# Patient Record
Sex: MALE | Race: BLACK OR AFRICAN AMERICAN | HISPANIC OR LATINO | Employment: FULL TIME | URBAN - METROPOLITAN AREA
[De-identification: names, ages, dates, MRNs, and addresses within clinical notes are randomized per-mention and may not be internally consistent; named-entity substitution may affect disease eponyms.]

---

## 2017-01-03 ENCOUNTER — ALLSCRIPTS OFFICE VISIT (OUTPATIENT)
Dept: OTHER | Facility: OTHER | Age: 32
End: 2017-01-03

## 2017-07-14 ENCOUNTER — APPOINTMENT (EMERGENCY)
Dept: CT IMAGING | Facility: HOSPITAL | Age: 32
DRG: 872 | End: 2017-07-14

## 2017-07-14 ENCOUNTER — HOSPITAL ENCOUNTER (INPATIENT)
Facility: HOSPITAL | Age: 32
LOS: 2 days | Discharge: HOME | DRG: 872 | End: 2017-07-17
Attending: EMERGENCY MEDICINE | Admitting: INTERNAL MEDICINE

## 2017-07-14 DIAGNOSIS — K57.32 DIVERTICULITIS OF LARGE INTESTINE WITHOUT PERFORATION OR ABSCESS WITHOUT BLEEDING: ICD-10-CM

## 2017-07-14 DIAGNOSIS — R10.84 GENERALIZED ABDOMINAL PAIN: ICD-10-CM

## 2017-07-14 DIAGNOSIS — A41.9 SEPSIS (HCC): ICD-10-CM

## 2017-07-14 DIAGNOSIS — K57.92 ACUTE DIVERTICULITIS OF INTESTINE: Primary | ICD-10-CM

## 2017-07-14 PROBLEM — R19.7 DIARRHEA: Status: ACTIVE | Noted: 2017-07-14

## 2017-07-14 PROBLEM — R10.9 ABDOMINAL PAIN: Status: ACTIVE | Noted: 2017-07-14

## 2017-07-14 PROBLEM — F17.200 CURRENT SMOKER: Status: ACTIVE | Noted: 2017-07-14

## 2017-07-14 LAB
ALBUMIN SERPL BCP-MCNC: 3.2 G/DL (ref 3.5–5)
ALP SERPL-CCNC: 104 U/L (ref 46–116)
ALT SERPL W P-5'-P-CCNC: 23 U/L (ref 12–78)
ANION GAP SERPL CALCULATED.3IONS-SCNC: 6 MMOL/L (ref 4–13)
AST SERPL W P-5'-P-CCNC: 20 U/L (ref 5–45)
BACTERIA UR QL AUTO: ABNORMAL /HPF
BASOPHILS # BLD AUTO: 0.06 THOUSANDS/ΜL (ref 0–0.1)
BASOPHILS NFR BLD AUTO: 0 % (ref 0–1)
BILIRUB SERPL-MCNC: 0.25 MG/DL (ref 0.2–1)
BILIRUB UR QL STRIP: ABNORMAL
BUN SERPL-MCNC: 13 MG/DL (ref 5–25)
CALCIUM SERPL-MCNC: 8.4 MG/DL (ref 8.3–10.1)
CHLORIDE SERPL-SCNC: 101 MMOL/L (ref 100–108)
CLARITY UR: CLEAR
CO2 SERPL-SCNC: 30 MMOL/L (ref 21–32)
COLOR UR: ABNORMAL
CREAT SERPL-MCNC: 1.2 MG/DL (ref 0.6–1.3)
EOSINOPHIL # BLD AUTO: 0.25 THOUSAND/ΜL (ref 0–0.61)
EOSINOPHIL NFR BLD AUTO: 2 % (ref 0–6)
ERYTHROCYTE [DISTWIDTH] IN BLOOD BY AUTOMATED COUNT: 13.8 % (ref 11.6–15.1)
GFR SERPL CREATININE-BSD FRML MDRD: >60 ML/MIN/1.73SQ M
GLUCOSE SERPL-MCNC: 107 MG/DL (ref 65–140)
GLUCOSE UR STRIP-MCNC: NEGATIVE MG/DL
HCT VFR BLD AUTO: 40.9 % (ref 36.5–49.3)
HGB BLD-MCNC: 13.7 G/DL (ref 12–17)
HGB UR QL STRIP.AUTO: ABNORMAL
KETONES UR STRIP-MCNC: ABNORMAL MG/DL
LACTATE SERPL-SCNC: 0.6 MMOL/L (ref 0.5–2)
LEUKOCYTE ESTERASE UR QL STRIP: NEGATIVE
LIPASE SERPL-CCNC: 159 U/L (ref 73–393)
LYMPHOCYTES # BLD AUTO: 3.14 THOUSANDS/ΜL (ref 0.6–4.47)
LYMPHOCYTES NFR BLD AUTO: 20 % (ref 14–44)
MCH RBC QN AUTO: 29 PG (ref 26.8–34.3)
MCHC RBC AUTO-ENTMCNC: 33.5 G/DL (ref 31.4–37.4)
MCV RBC AUTO: 87 FL (ref 82–98)
MONOCYTES # BLD AUTO: 1.03 THOUSAND/ΜL (ref 0.17–1.22)
MONOCYTES NFR BLD AUTO: 7 % (ref 4–12)
NEUTROPHILS # BLD AUTO: 10.98 THOUSANDS/ΜL (ref 1.85–7.62)
NEUTS SEG NFR BLD AUTO: 71 % (ref 43–75)
NITRITE UR QL STRIP: NEGATIVE
NON-SQ EPI CELLS URNS QL MICRO: ABNORMAL /HPF
NRBC BLD AUTO-RTO: 0 /100 WBCS
PH UR STRIP.AUTO: 5.5 [PH] (ref 4.5–8)
PLATELET # BLD AUTO: 253 THOUSANDS/UL (ref 149–390)
PMV BLD AUTO: 10 FL (ref 8.9–12.7)
POTASSIUM SERPL-SCNC: 4.1 MMOL/L (ref 3.5–5.3)
PROT SERPL-MCNC: 7.9 G/DL (ref 6.4–8.2)
PROT UR STRIP-MCNC: ABNORMAL MG/DL
RBC # BLD AUTO: 4.72 MILLION/UL (ref 3.88–5.62)
RBC #/AREA URNS AUTO: ABNORMAL /HPF
SODIUM SERPL-SCNC: 137 MMOL/L (ref 136–145)
SP GR UR STRIP.AUTO: 1.02 (ref 1–1.03)
UROBILINOGEN UR QL STRIP.AUTO: 1 E.U./DL
WBC # BLD AUTO: 15.46 THOUSAND/UL (ref 4.31–10.16)
WBC #/AREA URNS AUTO: ABNORMAL /HPF

## 2017-07-14 PROCEDURE — 83605 ASSAY OF LACTIC ACID: CPT | Performed by: EMERGENCY MEDICINE

## 2017-07-14 PROCEDURE — 80053 COMPREHEN METABOLIC PANEL: CPT | Performed by: EMERGENCY MEDICINE

## 2017-07-14 PROCEDURE — 81001 URINALYSIS AUTO W/SCOPE: CPT

## 2017-07-14 PROCEDURE — 96374 THER/PROPH/DIAG INJ IV PUSH: CPT

## 2017-07-14 PROCEDURE — 83690 ASSAY OF LIPASE: CPT | Performed by: EMERGENCY MEDICINE

## 2017-07-14 PROCEDURE — 85025 COMPLETE CBC W/AUTO DIFF WBC: CPT | Performed by: EMERGENCY MEDICINE

## 2017-07-14 PROCEDURE — 87040 BLOOD CULTURE FOR BACTERIA: CPT | Performed by: EMERGENCY MEDICINE

## 2017-07-14 PROCEDURE — 81002 URINALYSIS NONAUTO W/O SCOPE: CPT | Performed by: EMERGENCY MEDICINE

## 2017-07-14 PROCEDURE — 99285 EMERGENCY DEPT VISIT HI MDM: CPT

## 2017-07-14 PROCEDURE — 96375 TX/PRO/DX INJ NEW DRUG ADDON: CPT

## 2017-07-14 PROCEDURE — 36415 COLL VENOUS BLD VENIPUNCTURE: CPT | Performed by: EMERGENCY MEDICINE

## 2017-07-14 PROCEDURE — 74177 CT ABD & PELVIS W/CONTRAST: CPT

## 2017-07-14 PROCEDURE — 96361 HYDRATE IV INFUSION ADD-ON: CPT

## 2017-07-14 RX ORDER — ONDANSETRON 2 MG/ML
4 INJECTION INTRAMUSCULAR; INTRAVENOUS ONCE
Status: COMPLETED | OUTPATIENT
Start: 2017-07-14 | End: 2017-07-14

## 2017-07-14 RX ORDER — ONDANSETRON 2 MG/ML
4 INJECTION INTRAMUSCULAR; INTRAVENOUS EVERY 6 HOURS PRN
Status: DISCONTINUED | OUTPATIENT
Start: 2017-07-14 | End: 2017-07-17 | Stop reason: HOSPADM

## 2017-07-14 RX ORDER — ACETAMINOPHEN 325 MG/1
650 TABLET ORAL EVERY 6 HOURS PRN
Status: DISCONTINUED | OUTPATIENT
Start: 2017-07-14 | End: 2017-07-17 | Stop reason: HOSPADM

## 2017-07-14 RX ORDER — CALCIUM CARBONATE 200(500)MG
1000 TABLET,CHEWABLE ORAL DAILY PRN
Status: DISCONTINUED | OUTPATIENT
Start: 2017-07-14 | End: 2017-07-15

## 2017-07-14 RX ORDER — KETOROLAC TROMETHAMINE 30 MG/ML
15 INJECTION, SOLUTION INTRAMUSCULAR; INTRAVENOUS ONCE
Status: COMPLETED | OUTPATIENT
Start: 2017-07-14 | End: 2017-07-14

## 2017-07-14 RX ORDER — KETOROLAC TROMETHAMINE 30 MG/ML
INJECTION, SOLUTION INTRAMUSCULAR; INTRAVENOUS
Status: COMPLETED
Start: 2017-07-14 | End: 2017-07-14

## 2017-07-14 RX ORDER — SODIUM CHLORIDE 9 MG/ML
50 INJECTION, SOLUTION INTRAVENOUS CONTINUOUS
Status: DISCONTINUED | OUTPATIENT
Start: 2017-07-15 | End: 2017-07-17 | Stop reason: HOSPADM

## 2017-07-14 RX ORDER — ALBUTEROL SULFATE 90 UG/1
2 AEROSOL, METERED RESPIRATORY (INHALATION) EVERY 4 HOURS PRN
Status: DISCONTINUED | OUTPATIENT
Start: 2017-07-14 | End: 2017-07-17 | Stop reason: HOSPADM

## 2017-07-14 RX ORDER — KETOROLAC TROMETHAMINE 30 MG/ML
30 INJECTION, SOLUTION INTRAMUSCULAR; INTRAVENOUS EVERY 6 HOURS PRN
Status: DISCONTINUED | OUTPATIENT
Start: 2017-07-14 | End: 2017-07-17 | Stop reason: HOSPADM

## 2017-07-14 RX ORDER — NICOTINE 21 MG/24HR
1 PATCH, TRANSDERMAL 24 HOURS TRANSDERMAL DAILY
Status: DISCONTINUED | OUTPATIENT
Start: 2017-07-15 | End: 2017-07-17 | Stop reason: HOSPADM

## 2017-07-14 RX ORDER — MORPHINE SULFATE 4 MG/ML
4 INJECTION, SOLUTION INTRAMUSCULAR; INTRAVENOUS ONCE
Status: DISCONTINUED | OUTPATIENT
Start: 2017-07-14 | End: 2017-07-17 | Stop reason: HOSPADM

## 2017-07-14 RX ADMIN — KETOROLAC TROMETHAMINE 15 MG: 30 INJECTION, SOLUTION INTRAMUSCULAR at 23:07

## 2017-07-14 RX ADMIN — IOHEXOL 100 ML: 350 INJECTION, SOLUTION INTRAVENOUS at 21:54

## 2017-07-14 RX ADMIN — SODIUM CHLORIDE 1000 ML: 0.9 INJECTION, SOLUTION INTRAVENOUS at 21:21

## 2017-07-14 RX ADMIN — ONDANSETRON 4 MG: 2 INJECTION INTRAMUSCULAR; INTRAVENOUS at 23:01

## 2017-07-14 RX ADMIN — KETOROLAC TROMETHAMINE 15 MG: 30 INJECTION, SOLUTION INTRAMUSCULAR at 21:21

## 2017-07-14 RX ADMIN — PIPERACILLIN SODIUM,TAZOBACTAM SODIUM 3.38 G: 3; .375 INJECTION, POWDER, FOR SOLUTION INTRAVENOUS at 22:42

## 2017-07-14 RX ADMIN — KETOROLAC TROMETHAMINE 15 MG: 30 INJECTION, SOLUTION INTRAMUSCULAR; INTRAVENOUS at 23:07

## 2017-07-15 PROBLEM — A41.9 SEPSIS (HCC): Status: ACTIVE | Noted: 2017-07-15

## 2017-07-15 LAB
ERYTHROCYTE [DISTWIDTH] IN BLOOD BY AUTOMATED COUNT: 14 % (ref 11.6–15.1)
HCT VFR BLD AUTO: 39.8 % (ref 36.5–49.3)
HGB BLD-MCNC: 13.2 G/DL (ref 12–17)
MCH RBC QN AUTO: 29 PG (ref 26.8–34.3)
MCHC RBC AUTO-ENTMCNC: 33.2 G/DL (ref 31.4–37.4)
MCV RBC AUTO: 88 FL (ref 82–98)
PLATELET # BLD AUTO: 268 THOUSANDS/UL (ref 149–390)
PMV BLD AUTO: 10.3 FL (ref 8.9–12.7)
RBC # BLD AUTO: 4.55 MILLION/UL (ref 3.88–5.62)
WBC # BLD AUTO: 14.92 THOUSAND/UL (ref 4.31–10.16)

## 2017-07-15 PROCEDURE — 85027 COMPLETE CBC AUTOMATED: CPT | Performed by: PHYSICIAN ASSISTANT

## 2017-07-15 PROCEDURE — C9113 INJ PANTOPRAZOLE SODIUM, VIA: HCPCS | Performed by: PHYSICIAN ASSISTANT

## 2017-07-15 RX ORDER — LEVOFLOXACIN 5 MG/ML
500 INJECTION, SOLUTION INTRAVENOUS EVERY 24 HOURS
Status: DISCONTINUED | OUTPATIENT
Start: 2017-07-15 | End: 2017-07-17 | Stop reason: HOSPADM

## 2017-07-15 RX ORDER — PANTOPRAZOLE SODIUM 40 MG/1
40 INJECTION, POWDER, FOR SOLUTION INTRAVENOUS
Status: DISCONTINUED | OUTPATIENT
Start: 2017-07-15 | End: 2017-07-17 | Stop reason: HOSPADM

## 2017-07-15 RX ORDER — MORPHINE SULFATE 4 MG/ML
4 INJECTION, SOLUTION INTRAMUSCULAR; INTRAVENOUS EVERY 4 HOURS PRN
Status: DISCONTINUED | OUTPATIENT
Start: 2017-07-15 | End: 2017-07-17 | Stop reason: HOSPADM

## 2017-07-15 RX ADMIN — SODIUM CHLORIDE 125 ML/HR: 0.9 INJECTION, SOLUTION INTRAVENOUS at 07:59

## 2017-07-15 RX ADMIN — CEFAZOLIN SODIUM 1000 MG: 1 SOLUTION INTRAVENOUS at 06:39

## 2017-07-15 RX ADMIN — ENOXAPARIN SODIUM 40 MG: 40 INJECTION SUBCUTANEOUS at 08:17

## 2017-07-15 RX ADMIN — SODIUM CHLORIDE 125 ML/HR: 0.9 INJECTION, SOLUTION INTRAVENOUS at 00:08

## 2017-07-15 RX ADMIN — LEVOFLOXACIN 500 MG: 5 INJECTION, SOLUTION INTRAVENOUS at 11:57

## 2017-07-15 RX ADMIN — METRONIDAZOLE 500 MG: 500 INJECTION, SOLUTION INTRAVENOUS at 14:27

## 2017-07-15 RX ADMIN — KETOROLAC TROMETHAMINE 30 MG: 30 INJECTION, SOLUTION INTRAMUSCULAR at 22:04

## 2017-07-15 RX ADMIN — KETOROLAC TROMETHAMINE 30 MG: 30 INJECTION, SOLUTION INTRAMUSCULAR at 11:59

## 2017-07-15 RX ADMIN — ONDANSETRON 4 MG: 2 INJECTION INTRAMUSCULAR; INTRAVENOUS at 08:17

## 2017-07-15 RX ADMIN — METRONIDAZOLE 500 MG: 500 INJECTION, SOLUTION INTRAVENOUS at 22:05

## 2017-07-15 RX ADMIN — METRONIDAZOLE 500 MG: 500 INJECTION, SOLUTION INTRAVENOUS at 07:58

## 2017-07-15 RX ADMIN — PANTOPRAZOLE SODIUM 40 MG: 40 INJECTION, POWDER, FOR SOLUTION INTRAVENOUS at 08:17

## 2017-07-16 LAB
ALBUMIN SERPL BCP-MCNC: 2.8 G/DL (ref 3.5–5)
ALP SERPL-CCNC: 90 U/L (ref 46–116)
ALT SERPL W P-5'-P-CCNC: 19 U/L (ref 12–78)
ANION GAP SERPL CALCULATED.3IONS-SCNC: 6 MMOL/L (ref 4–13)
AST SERPL W P-5'-P-CCNC: 17 U/L (ref 5–45)
BASOPHILS # BLD AUTO: 0.06 THOUSANDS/ΜL (ref 0–0.1)
BASOPHILS NFR BLD AUTO: 1 % (ref 0–1)
BILIRUB SERPL-MCNC: 0.37 MG/DL (ref 0.2–1)
BUN SERPL-MCNC: 10 MG/DL (ref 5–25)
CALCIUM SERPL-MCNC: 8.8 MG/DL (ref 8.3–10.1)
CHLORIDE SERPL-SCNC: 105 MMOL/L (ref 100–108)
CO2 SERPL-SCNC: 28 MMOL/L (ref 21–32)
CREAT SERPL-MCNC: 1.12 MG/DL (ref 0.6–1.3)
EOSINOPHIL # BLD AUTO: 0.23 THOUSAND/ΜL (ref 0–0.61)
EOSINOPHIL NFR BLD AUTO: 2 % (ref 0–6)
ERYTHROCYTE [DISTWIDTH] IN BLOOD BY AUTOMATED COUNT: 13.5 % (ref 11.6–15.1)
GFR SERPL CREATININE-BSD FRML MDRD: >60 ML/MIN/1.73SQ M
GLUCOSE SERPL-MCNC: 84 MG/DL (ref 65–140)
HCT VFR BLD AUTO: 38.5 % (ref 36.5–49.3)
HGB BLD-MCNC: 12.8 G/DL (ref 12–17)
LYMPHOCYTES # BLD AUTO: 3.04 THOUSANDS/ΜL (ref 0.6–4.47)
LYMPHOCYTES NFR BLD AUTO: 26 % (ref 14–44)
MCH RBC QN AUTO: 28.8 PG (ref 26.8–34.3)
MCHC RBC AUTO-ENTMCNC: 33.2 G/DL (ref 31.4–37.4)
MCV RBC AUTO: 87 FL (ref 82–98)
MONOCYTES # BLD AUTO: 0.77 THOUSAND/ΜL (ref 0.17–1.22)
MONOCYTES NFR BLD AUTO: 7 % (ref 4–12)
NEUTROPHILS # BLD AUTO: 7.64 THOUSANDS/ΜL (ref 1.85–7.62)
NEUTS SEG NFR BLD AUTO: 64 % (ref 43–75)
NRBC BLD AUTO-RTO: 0 /100 WBCS
PLATELET # BLD AUTO: 254 THOUSANDS/UL (ref 149–390)
PMV BLD AUTO: 9.8 FL (ref 8.9–12.7)
POTASSIUM SERPL-SCNC: 4.2 MMOL/L (ref 3.5–5.3)
PROT SERPL-MCNC: 7.3 G/DL (ref 6.4–8.2)
RBC # BLD AUTO: 4.44 MILLION/UL (ref 3.88–5.62)
SODIUM SERPL-SCNC: 139 MMOL/L (ref 136–145)
WBC # BLD AUTO: 11.74 THOUSAND/UL (ref 4.31–10.16)

## 2017-07-16 PROCEDURE — 85025 COMPLETE CBC W/AUTO DIFF WBC: CPT | Performed by: INTERNAL MEDICINE

## 2017-07-16 PROCEDURE — 80053 COMPREHEN METABOLIC PANEL: CPT | Performed by: INTERNAL MEDICINE

## 2017-07-16 PROCEDURE — C9113 INJ PANTOPRAZOLE SODIUM, VIA: HCPCS | Performed by: PHYSICIAN ASSISTANT

## 2017-07-16 RX ADMIN — METRONIDAZOLE 500 MG: 500 INJECTION, SOLUTION INTRAVENOUS at 14:40

## 2017-07-16 RX ADMIN — LEVOFLOXACIN 500 MG: 5 INJECTION, SOLUTION INTRAVENOUS at 10:54

## 2017-07-16 RX ADMIN — METRONIDAZOLE 500 MG: 500 INJECTION, SOLUTION INTRAVENOUS at 06:41

## 2017-07-16 RX ADMIN — ENOXAPARIN SODIUM 40 MG: 40 INJECTION SUBCUTANEOUS at 09:08

## 2017-07-16 RX ADMIN — PANTOPRAZOLE SODIUM 40 MG: 40 INJECTION, POWDER, FOR SOLUTION INTRAVENOUS at 09:08

## 2017-07-16 RX ADMIN — METRONIDAZOLE 500 MG: 500 INJECTION, SOLUTION INTRAVENOUS at 23:00

## 2017-07-17 VITALS
DIASTOLIC BLOOD PRESSURE: 80 MMHG | HEART RATE: 92 BPM | OXYGEN SATURATION: 100 % | HEIGHT: 71 IN | BODY MASS INDEX: 38.46 KG/M2 | WEIGHT: 274.69 LBS | SYSTOLIC BLOOD PRESSURE: 135 MMHG | RESPIRATION RATE: 18 BRPM | TEMPERATURE: 97.2 F

## 2017-07-17 PROBLEM — K57.92 DIVERTICULITIS: Status: ACTIVE | Noted: 2017-07-17

## 2017-07-17 PROCEDURE — C9113 INJ PANTOPRAZOLE SODIUM, VIA: HCPCS | Performed by: PHYSICIAN ASSISTANT

## 2017-07-17 RX ORDER — METRONIDAZOLE 500 MG/1
500 TABLET ORAL EVERY 8 HOURS SCHEDULED
Qty: 30 TABLET | Refills: 0 | Status: SHIPPED | OUTPATIENT
Start: 2017-07-17 | End: 2017-07-27

## 2017-07-17 RX ORDER — CIPROFLOXACIN 500 MG/1
500 TABLET, FILM COATED ORAL EVERY 12 HOURS SCHEDULED
Qty: 20 TABLET | Refills: 0 | Status: SHIPPED | OUTPATIENT
Start: 2017-07-17 | End: 2017-07-27

## 2017-07-17 RX ADMIN — PANTOPRAZOLE SODIUM 40 MG: 40 INJECTION, POWDER, FOR SOLUTION INTRAVENOUS at 08:10

## 2017-07-17 RX ADMIN — SODIUM CHLORIDE 50 ML/HR: 0.9 INJECTION, SOLUTION INTRAVENOUS at 09:47

## 2017-07-17 RX ADMIN — LEVOFLOXACIN 500 MG: 5 INJECTION, SOLUTION INTRAVENOUS at 11:11

## 2017-07-17 RX ADMIN — ENOXAPARIN SODIUM 40 MG: 40 INJECTION SUBCUTANEOUS at 08:10

## 2017-07-17 RX ADMIN — METRONIDAZOLE 500 MG: 500 INJECTION, SOLUTION INTRAVENOUS at 06:02

## 2017-07-19 ENCOUNTER — GENERIC CONVERSION - ENCOUNTER (OUTPATIENT)
Dept: OTHER | Facility: OTHER | Age: 32
End: 2017-07-19

## 2017-07-20 LAB
BACTERIA BLD CULT: NORMAL
BACTERIA BLD CULT: NORMAL

## 2017-07-25 ENCOUNTER — GENERIC CONVERSION - ENCOUNTER (OUTPATIENT)
Dept: OTHER | Facility: OTHER | Age: 32
End: 2017-07-25

## 2017-07-25 ENCOUNTER — ALLSCRIPTS OFFICE VISIT (OUTPATIENT)
Dept: OTHER | Facility: OTHER | Age: 32
End: 2017-07-25

## 2017-10-21 ENCOUNTER — HOSPITAL ENCOUNTER (EMERGENCY)
Facility: HOSPITAL | Age: 32
Discharge: HOME | End: 2017-10-21
Admitting: EMERGENCY MEDICINE

## 2017-10-21 VITALS
HEART RATE: 109 BPM | DIASTOLIC BLOOD PRESSURE: 93 MMHG | TEMPERATURE: 98.8 F | OXYGEN SATURATION: 98 % | SYSTOLIC BLOOD PRESSURE: 151 MMHG | BODY MASS INDEX: 38.04 KG/M2 | RESPIRATION RATE: 18 BRPM | WEIGHT: 272.71 LBS

## 2017-10-21 DIAGNOSIS — L02.91 ABSCESS: Primary | ICD-10-CM

## 2017-10-21 DIAGNOSIS — Z86.14 HISTORY OF METHICILLIN RESISTANT STAPHYLOCOCCUS AUREUS (MRSA): ICD-10-CM

## 2017-10-21 PROCEDURE — 99282 EMERGENCY DEPT VISIT SF MDM: CPT

## 2017-10-21 RX ORDER — OXYCODONE HYDROCHLORIDE AND ACETAMINOPHEN 5; 325 MG/1; MG/1
1 TABLET ORAL ONCE
Status: COMPLETED | OUTPATIENT
Start: 2017-10-21 | End: 2017-10-21

## 2017-10-21 RX ORDER — CHLORHEXIDINE GLUCONATE 0.12 MG/ML
15 RINSE ORAL 2 TIMES DAILY
Qty: 120 ML | Refills: 0 | Status: SHIPPED | OUTPATIENT
Start: 2017-10-21 | End: 2017-12-11

## 2017-10-21 RX ORDER — OXYCODONE HYDROCHLORIDE AND ACETAMINOPHEN 5; 325 MG/1; MG/1
1 TABLET ORAL EVERY 4 HOURS PRN
Qty: 6 TABLET | Refills: 0 | Status: SHIPPED | OUTPATIENT
Start: 2017-10-21 | End: 2017-10-31

## 2017-10-21 RX ADMIN — OXYCODONE HYDROCHLORIDE AND ACETAMINOPHEN 1 TABLET: 5; 325 TABLET ORAL at 01:15

## 2017-10-21 NOTE — ED PROVIDER NOTES
History  Chief Complaint   Patient presents with    Abscess     right lower legt noted for raised red area  no drainage  increased pain  75-year-old male presents to the emergency department for right the calf focal area of redness warmth and tenderness  Patient admits to chronic recurring many abscesses  Patient does give a past medical history significant for MRSA  Patient shows area very similar in the lower leg in which she treated at home with squeezing pus out causing will be old the the lesion to heal well  Patient concerned due to new lesion just above old lesion  Patient denies fevers chills  Patient denies weakness of the hands arms legs or feet  Patient denies abdominal pain or shortness of breath  At this time will evaluate and address lesion on right calf  History provided by:  Patient   used: No    Abscess   Location:  Leg  Leg abscess location:  R leg  Abscess quality: induration, painful, redness and warmth    Red streaking: no    Progression:  Worsening  Pain details:     Quality:  Pressure    Severity:  Moderate    Timing:  Constant    Progression:  Worsening  Chronicity:  New  Associated symptoms: no fever and no headaches        Prior to Admission Medications   Prescriptions Last Dose Informant Patient Reported? Taking? ALBUTEROL IN   Yes No   Sig: Inhale      Facility-Administered Medications: None       Past Medical History:   Diagnosis Date    Abscess     Asthma        Past Surgical History:   Procedure Laterality Date    FRACTURE SURGERY      MANDIBLE FRACTURE SURGERY         Family History   Problem Relation Age of Onset    Diabetes Maternal Aunt      I have reviewed and agree with the history as documented      Social History   Substance Use Topics    Smoking status: Current Every Day Smoker     Packs/day: 2 00     Years: 15 00     Types: Cigarettes    Smokeless tobacco: Never Used    Alcohol use No        Review of Systems   Constitutional: Negative for chills and fever  HENT: Negative for dental problem  Eyes: Negative for itching  Respiratory: Negative for chest tightness and shortness of breath  Cardiovascular: Negative for chest pain  Gastrointestinal: Negative for abdominal pain  Endocrine: Negative for heat intolerance  Genitourinary: Negative for dysuria  Musculoskeletal: Negative for back pain  Skin:        Lesion right calf  Patient admits to recurrent many abscesses  Patient is to prior history of MRSA  Allergic/Immunologic: Negative for food allergies  Neurological: Negative for syncope and headaches  Psychiatric/Behavioral: Negative for agitation  Physical Exam  ED Triage Vitals [10/21/17 0029]   Temperature Pulse Respirations Blood Pressure SpO2   98 8 °F (37 1 °C) (!) 109 18 151/93 98 %      Temp src Heart Rate Source Patient Position - Orthostatic VS BP Location FiO2 (%)   -- Monitor -- Right arm --      Pain Score       9           Physical Exam   Constitutional: He is oriented to person, place, and time  He appears well-developed and well-nourished  HENT:   Head: Normocephalic  Eyes: Conjunctivae are normal    Neck: Normal range of motion  Neck supple  No JVD present  Cardiovascular: Normal rate  Pulmonary/Chest: Effort normal    Abdominal: Soft  Musculoskeletal: Normal range of motion  Lymphadenopathy:     He has no cervical adenopathy  Neurological: He is alert and oriented to person, place, and time  Skin: Skin is warm and dry              ED Medications  Medications   oxyCODONE-acetaminophen (PERCOCET) 5-325 mg per tablet 1 tablet (1 tablet Oral Given 10/21/17 0115)       Diagnostic Studies  Labs Reviewed - No data to display    No orders to display       Procedures  Incision/Drainage  Date/Time: 10/21/2017 1:13 AM  Performed by: Fifi Velasquez by: Elsa Shepard     Patient location:  ED and bedside  Consent:     Consent obtained:  Verbal    Consent given by:  Patient and spouse    Risks discussed:  Bleeding, incomplete drainage, pain, damage to other organs and infection    Alternatives discussed:  No treatment and delayed treatment  Universal protocol:     Procedure explained and questions answered to patient or proxy's satisfaction: yes      Relevant documents present and verified: yes      Patient identity confirmed:  Verbally with patient, arm band and provided demographic data  Location:     Type:  Abscess    Location:  Lower extremity    Lower extremity location:  R leg  Procedure details:     Complexity:  Simple    Incision types:  Punture    Approach:  Puncture    Incision depth:  Subcutaneous    Drainage:  Purulent    Drainage amount: Moderate    Wound treatment:  Wound left open  Post-procedure details:     Patient tolerance of procedure: Tolerated well, no immediate complications          Phone Contacts  ED Phone Contact    ED Course  ED Course                                MDM  Number of Diagnoses or Management Options  Abscess:   History of methicillin resistant staphylococcus aureus (MRSA):   Diagnosis management comments: 58-year-old male with recurrent small abscess right lateral calf  I was able to puncture abscess and express a moderate amount of pustular material until only blood was from wound  Patient tolerated procedure well  At this time do not feel patient needs antibiotics  This was a shared decision with patient  Patient was agreeable to treat at home without antibiotics  Patient was educated on persistent or worsening signs symptoms and to return to the emergency department for re-evaluation  Will provide pain medicines so that patient can treat the lesion at home  Patient agreeable with plan  Patient discharged in stable condition  Patient admits to improved symptoms after management      CritCare Time    Disposition  Final diagnoses:   Abscess   History of methicillin resistant staphylococcus aureus (MRSA)     ED Disposition     ED Disposition Condition Comment    Discharge  Tessa Micky discharge to home/self care  Condition at discharge: Stable        Follow-up Information     Follow up With Specialties Details Why [address] [facility] Emergency [facility] Emergency Medicine  If symptoms worsen Frannie Patrick [address] [place] [facility], [address] [place], [place], [address]        Discharge Medication List as of 10/21/2017  1:17 AM      START taking these medications    Details   chlorhexidine (PERIDEX) 0 12 % solution Apply 15 mL to the mouth or throat 2 (two) times a day, Starting Sat 10/21/2017, Print      mupirocin (BACTROBAN) 2 % nasal ointment into each nostril 2 (two) times a day, Starting Sat 10/21/2017, Print      oxyCODONE-acetaminophen (PERCOCET) 5-325 mg per tablet Take 1 tablet by mouth every 4 (four) hours as needed for moderate pain for up to 10 days Max Daily Amount: 6 tablets, Starting Sat 10/21/2017, Until Tue 10/31/2017, Print         CONTINUE these medications which have NOT CHANGED    Details   ALBUTEROL IN Inhale, Until Discontinued, Historical Med           No discharge procedures on file      ED Provider  Electronically Signed by       Marie Thomas PA-C  10/22/17 7211

## 2017-10-21 NOTE — DISCHARGE INSTRUCTIONS
Abscess   WHAT YOU NEED TO KNOW:   A warm compress may help your abscess drain  Your healthcare provider may make a cut in the abscess so it can drain  You may need surgery to remove an abscess that is on your hands or buttocks  DISCHARGE INSTRUCTIONS:   Return to the emergency department if:   · The area around your abscess becomes very painful, warm, or has red streaks  · You have a fever and chills  · Your heart is beating faster than usual      · You feel faint or confused  Contact your healthcare provider if:   · Your abscess gets bigger or does not get better  · Your abscess returns  · You have questions or concerns about your condition or care  Medicines: You may  need any of the following:  · Antibiotics  help treat a bacterial infection  · Acetaminophen  decreases pain and fever  It is available without a doctor's order  Ask how much to take and how often to take it  Follow directions  Acetaminophen can cause liver damage if not taken correctly  · NSAIDs , such as ibuprofen, help decrease swelling, pain, and fever  This medicine is available with or without a doctor's order  NSAIDs can cause stomach bleeding or kidney problems in certain people  If you take blood thinner medicine, always ask your healthcare provider if NSAIDs are safe for you  Always read the medicine label and follow directions  · Take your medicine as directed  Contact your healthcare provider if you think your medicine is not helping or if you have side effects  Tell him or her if you are allergic to any medicine  Keep a list of the medicines, vitamins, and herbs you take  Include the amounts, and when and why you take them  Bring the list or the pill bottles to follow-up visits  Carry your medicine list with you in case of an emergency  Self-care:   · Apply a warm compress to your abscess  This will help it open and drain  Wet a washcloth in warm, but not hot, water  Apply the compress for 10 minutes  Repeat this 4 times each day  Do not  press on an abscess or try to open it with a needle  You may push the bacteria deeper or into your blood  · Do not share your clothes, towels, or sheets with anyone  This can spread the infection to others  · Wash your hands often  This can help prevent the spread of germs  Use soap and water or an alcohol-based hand rub  Care for your wound after it is drained:   · Care for your wound as directed  If your healthcare provider says it is okay, carefully remove the bandage and gauze packing  You may need to soak the gauze to get it out of your wound  Clean your wound and the area around it as directed  Dry the area and put on new, clean bandages  Change your bandages when they get wet or dirty  · Ask your healthcare provider how to change the gauze in your wound  Keep track of how many pieces of gauze are placed inside the wound  Do not put too much packing in the wound  Do not pack the gauze too tightly in your wound  Follow up with your healthcare provider in 1 to 3 days: You may need to have your packing removed or your bandage changed  Write down your questions so you remember to ask them during your visits  © 2017 [employer]  Information is for End User's use only and may not be sold, redistributed or otherwise used for commercial purposes  All illustrations and images included in CareNotes® are the copyrighted property of [employer]  or [employer]  The above information is an  only  It is not intended as medical advice for individual conditions or treatments  Talk to your doctor, nurse or pharmacist before following any medical regimen to see if it is safe and effective for you

## 2017-12-11 ENCOUNTER — HOSPITAL ENCOUNTER (EMERGENCY)
Facility: HOSPITAL | Age: 32
Discharge: HOME | End: 2017-12-11
Attending: EMERGENCY MEDICINE | Admitting: EMERGENCY MEDICINE

## 2017-12-11 VITALS
OXYGEN SATURATION: 98 % | TEMPERATURE: 98 F | DIASTOLIC BLOOD PRESSURE: 77 MMHG | WEIGHT: 270 LBS | HEART RATE: 101 BPM | SYSTOLIC BLOOD PRESSURE: 126 MMHG | RESPIRATION RATE: 16 BRPM | BODY MASS INDEX: 37.66 KG/M2

## 2017-12-11 DIAGNOSIS — K62.5 RECTAL BLEEDING: Primary | ICD-10-CM

## 2017-12-11 PROCEDURE — 99284 EMERGENCY DEPT VISIT MOD MDM: CPT

## 2017-12-11 PROCEDURE — 82272 OCCULT BLD FECES 1-3 TESTS: CPT

## 2017-12-11 RX ORDER — DOCUSATE SODIUM 100 MG/1
100 CAPSULE, LIQUID FILLED ORAL EVERY 12 HOURS
Qty: 15 CAPSULE | Refills: 0 | Status: SHIPPED | OUTPATIENT
Start: 2017-12-11 | End: 2017-12-23

## 2017-12-11 NOTE — ED PROVIDER NOTES
History  Chief Complaint   Patient presents with    Rectal Bleeding     pt reports blood in stool since yesterday, pt denies blood thinner use, denies abd pain  15-year-old male with a history of diverticulitis presents to the emergency department with a 1 day history of intermittent bright red blood per rectum  Patient states last evening he had a normal bowel movement and then noticed some blood both in the toilet and on the toilet paper  He had complained of some rectal itching at the time  No abdominal pain  Again today had another bowel movement which was normal in color but he did notice blood in the toilet and on the toilet paper  He states that he is not straining to have a bowel movement  He was supposed to have a colonoscopy after his episode of diverticulitis, however he was unable to afford the procedure and so he canceled it    No prior history of rectal bleeding        History provided by:  Patient   used: No    Rectal Bleeding - Minor   Quality:  Bright red  Amount:  Unable to specify  Duration:  1 day  Timing:  Intermittent  Chronicity:  New  Context: defecation    Context: not anal fissures, not anal penetration, not constipation, not diarrhea, not hemorrhoids, not rectal injury, not rectal pain and not spontaneously    Relieved by:  None tried  Worsened by:  Defecation  Ineffective treatments:  None tried  Associated symptoms: no abdominal pain, no dizziness, no fever, no hematemesis, no light-headedness, no loss of consciousness, no recent illness and no vomiting    Risk factors: no anticoagulant use, no hx of colorectal cancer, no hx of colorectal surgery, no hx of IBD, no liver disease, no NSAID use and no steroid use        None       Past Medical History:   Diagnosis Date    Abscess     Asthma        Past Surgical History:   Procedure Laterality Date    FRACTURE SURGERY      MANDIBLE FRACTURE SURGERY         Family History   Problem Relation Age of Onset    Diabetes Maternal Aunt      I have reviewed and agree with the history as documented  Social History   Substance Use Topics    Smoking status: Former Smoker     Packs/day: 2 00     Years: 15 00     Types: Cigarettes    Smokeless tobacco: Never Used    Alcohol use No        Review of Systems   Constitutional: Negative  Negative for fever  HENT: Negative  Eyes: Negative  Respiratory: Negative  Cardiovascular: Negative  Gastrointestinal: Positive for anal bleeding and hematochezia  Negative for abdominal distention, abdominal pain, constipation, diarrhea, hematemesis, nausea, rectal pain and vomiting  Genitourinary: Negative  Musculoskeletal: Negative for neck pain  Skin: Negative  Allergic/Immunologic: Negative  Neurological: Negative  Negative for dizziness, loss of consciousness, weakness, light-headedness, numbness and headaches  Hematological: Negative  Psychiatric/Behavioral: Negative  All other systems reviewed and are negative  Physical Exam  ED Triage Vitals [12/11/17 1431]   Temperature Pulse Respirations Blood Pressure SpO2   98 °F (36 7 °C) 101 16 126/77 98 %      Temp src Heart Rate Source Patient Position - Orthostatic VS BP Location FiO2 (%)   -- Monitor Sitting Right arm --      Pain Score       --           Orthostatic Vital Signs  Vitals:    12/11/17 1431   BP: 126/77   Pulse: 101   Patient Position - Orthostatic VS: Sitting       Physical Exam   Constitutional: He is oriented to person, place, and time  He appears well-developed and well-nourished  Non-toxic appearance  He does not have a sickly appearance  He does not appear ill  No distress  HENT:   Head: Normocephalic and atraumatic  Right Ear: External ear normal    Left Ear: External ear normal    Mouth/Throat: Oropharynx is clear and moist    Eyes: Conjunctivae are normal  Pupils are equal, round, and reactive to light  No scleral icterus     Cardiovascular: Normal rate, regular rhythm and normal heart sounds  Pulmonary/Chest: Effort normal and breath sounds normal    Abdominal: Soft  Bowel sounds are normal  He exhibits no distension and no mass  There is no tenderness  No hernia  obese   Genitourinary: Rectal exam shows guaiac positive stool (trace positive)  Rectal exam shows no external hemorrhoid, no internal hemorrhoid, no fissure, no mass, no tenderness and anal tone normal    Neurological: He is alert and oriented to person, place, and time  He has normal strength and normal reflexes  He exhibits normal muscle tone  Skin: Skin is warm and dry  No rash noted  He is not diaphoretic  No erythema  No pallor  Psychiatric: He has a normal mood and affect  Nursing note and vitals reviewed  ED Medications  Medications - No data to display    Diagnostic Studies  Results Reviewed     None                 No orders to display              Procedures  Procedures       Phone Contacts  ED Phone Contact    ED Course  ED Course                                MDM  Number of Diagnoses or Management Options  Diagnosis management comments: 73-year-old male presents with 2 episodes of bright red blood per rectum  He states after he had a bowel movement he noticed some drops of blood in the toilet and on the toilet paper  He has had some rectal irritation   No abdominal pain, fevers, nausea or vomiting  No prior history of rectal bleeding  He was supposed to have a colonoscopy after his episode of diverticulitis but was unable to afford the down payment for the procedure  On exam he appears well he is in no acute distress  He is no longer tachycardic on exam   His abdomen is soft and nontender  No sign of external or internal hemorrhoids or fissures  He is trace heme-positive this is most likely related to either a fissure or possible internal hemorrhoid    Will provide stool softeners, topical rectal creams and also have patient follow up with Gastroenterology for colonoscopy once he gets his insurance    CritCare Time    Disposition  Final diagnoses:   Rectal bleeding     Time reflects when diagnosis was documented in both MDM as applicable and the Disposition within this note     Time User Action Codes Description Comment    12/11/2017  4:00 PM Leo Mediate Add [K62 5] Rectal bleeding       ED Disposition     ED Disposition Condition Comment    Discharge  Yung Clayton discharge to home/self care  Condition at discharge: Good        Follow-up Information     Follow up With Specialties Details Why [facility] Schedule an appointment as soon as possible for a visit in 2 days  [address]  [facility] [address] [id number]  [phone]        Patient's Medications   Discharge Prescriptions    DOCUSATE SODIUM (COLACE) 100 MG CAPSULE    Take 1 capsule by mouth every 12 (twelve) hours       Start Date: 12/11/2017End Date: --       Order Dose: 100 mg       Quantity: 15 capsule    Refills: 0    HYDROCORTISONE-PRAMOXINE (PROCTOFOAM-HC) RECTAL FOAM    Insert 1 applicator into the rectum 2 (two) times a day       Start Date: 12/11/2017End Date: --       Order Dose: 1 applicator       Quantity: 10 g    Refills: 0     No discharge procedures on file      ED Provider  Electronically Signed by           Dasia Salazar DO  12/11/17 9999

## 2017-12-11 NOTE — DISCHARGE INSTRUCTIONS
Gastrointestinal Bleeding   WHAT YOU NEED TO KNOW:   Gastrointestinal (GI) bleeding may occur in any part of your digestive tract  This includes your esophagus, stomach, intestines, rectum, or anus  Bleeding may be mild to severe  Your bleeding may begin suddenly, or start slowly and last for a longer period of time  Bleeding that lasts for a longer period of time is called chronic GI bleeding  DISCHARGE INSTRUCTIONS:   Call 911 for any of the following:   · You have shortness of breath or trouble breathing  · You faint or lose consciousness  · You have chest pain  Return to the emergency department if:   · You feel dizzy or are too weak to stand  · Your heart is beating faster than usual      · You vomit blood, or your vomit looks like coffee grounds  · You have blood in your bowel movement  · You have abdominal pain or swelling  Contact your healthcare provider if:   · You have bowel movements that are tarry or black  · You have nausea or are vomiting  · You have heartburn  · You have questions or concerns about your condition or care  Activity:  Rest as directed  Ask when you can return to your usual activities, such as work  Slowly do more each day  Nutrition:  Ask if you need to be on a special diet  A special diet can help treat GI conditions and prevent problems such as GI bleeding  Eat small meals more often while your digestive system heals  Avoid or limit caffeine and spicy foods  Also avoid foods that cause heartburn, nausea, or diarrhea  Prevent GI bleeding:   · Manage GI conditions as directed  Examples of GI conditions include gastroesophageal reflux, peptic ulcer disease, and ulcerative colitis  Take all medicines for these conditions as directed  · Limit or do not take NSAIDs  Ask your healthcare provider if it is safe for you to take NSAIDs  NSAIDs can increase your risk for ulcers and GI bleeding  · Do not drink alcohol    Alcohol can cause ulcers and esophageal varices  Esophageal varices are swollen blood vessels in your esophagus  Over time the blood vessels become weak and may bleed  · Do not smoke  Nicotine and other chemicals in cigarettes and cigars can increase your risk for ulcers  Ask your healthcare provider for information if you currently smoke and need help to quit  E-cigarettes or smokeless tobacco still contain nicotine  Talk to your healthcare provider before you use these products  Follow up with your healthcare provider as directed: You may need to return for a colonoscopy, endoscopy, or other tests  These tests can make sure you do not have more bleeding  Write down your questions so you remember to ask them during your visits  © 2017 [address] [facility] Information is for End User's use only and may not be sold, redistributed or otherwise used for commercial purposes  All illustrations and images included in CareNotes® are the copyrighted property of [employer]  or [employer]  The above information is an  only  It is not intended as medical advice for individual conditions or treatments  Talk to your doctor, nurse or pharmacist before following any medical regimen to see if it is safe and effective for you  Rectal Bleeding   WHAT YOU NEED TO KNOW:   Rectal bleeding can be caused by constipation, hemorrhoids, or anal fissures  It may also be caused by polyps, tumors, or medical conditions, such as colitis or diverticulitis  DISCHARGE INSTRUCTIONS:   Medicines:   · Pain medicine: You may be given medicine to take away or decrease pain  Do not wait until the pain is severe before you take your medicine  · Iron supplement:  Iron helps your body make more red blood cells  · Steroids: This medicine decreases inflammation in your rectum  It may be applied as a cream, ointment, or lotion  · Take your medicine as directed    Contact your healthcare provider if you think your medicine is not helping or if you have side effects  Tell him of her if you are allergic to any medicine  Keep a list of the medicines, vitamins, and herbs you take  Include the amounts, and when and why you take them  Bring the list or the pill bottles to follow-up visits  Carry your medicine list with you in case of an emergency  Follow up with your healthcare provider as directed:  Write down your questions so you remember to ask them during your visits  Drink liquids as directed:  Ask your healthcare provider how much liquid to drink each day and which liquids are best for you  This will help prevent dehydration and constipation  Contact your healthcare provider if:   · You have a fever  · Your rectal bleeding stopped for a time, but has started again  · You have nausea  · You have cold, sweaty, pale skin  · You have changes in your bowel movements, such as diarrhea  · You have questions or concerns about your condition or care  Return to the emergency department if:   · You are breathing faster than usual     · You are dizzy, lightheaded, or feel faint  · You are confused or cannot think clearly  · You urinate less than usual or not at all  · Your rectal bleeding is constant or heavy  · You have severe abdominal pain or cramping  © 2017 [employer]  Information is for End User's use only and may not be sold, redistributed or otherwise used for commercial purposes  All illustrations and images included in CareNotes® are the copyrighted property of [employer]  or [employer]  The above information is an  only  It is not intended as medical advice for individual conditions or treatments  Talk to your doctor, nurse or pharmacist before following any medical regimen to see if it is safe and effective for you

## 2017-12-23 ENCOUNTER — APPOINTMENT (EMERGENCY)
Dept: CT IMAGING | Facility: HOSPITAL | Age: 32
End: 2017-12-23

## 2017-12-23 ENCOUNTER — HOSPITAL ENCOUNTER (EMERGENCY)
Facility: HOSPITAL | Age: 32
Discharge: HOME | End: 2017-12-23
Attending: EMERGENCY MEDICINE | Admitting: EMERGENCY MEDICINE

## 2017-12-23 VITALS
RESPIRATION RATE: 18 BRPM | BODY MASS INDEX: 38.22 KG/M2 | SYSTOLIC BLOOD PRESSURE: 118 MMHG | HEART RATE: 102 BPM | TEMPERATURE: 99.3 F | DIASTOLIC BLOOD PRESSURE: 57 MMHG | WEIGHT: 274 LBS | OXYGEN SATURATION: 99 %

## 2017-12-23 DIAGNOSIS — K57.92 DIVERTICULITIS: Primary | ICD-10-CM

## 2017-12-23 LAB
ALBUMIN SERPL BCP-MCNC: 3.7 G/DL (ref 3.5–5)
ALP SERPL-CCNC: 99 U/L (ref 46–116)
ALT SERPL W P-5'-P-CCNC: 24 U/L (ref 12–78)
ANION GAP SERPL CALCULATED.3IONS-SCNC: 10 MMOL/L (ref 4–13)
AST SERPL W P-5'-P-CCNC: 23 U/L (ref 5–45)
BASOPHILS # BLD AUTO: 0.04 THOUSANDS/ΜL (ref 0–0.1)
BASOPHILS NFR BLD AUTO: 0 % (ref 0–1)
BILIRUB SERPL-MCNC: 0.54 MG/DL (ref 0.2–1)
BUN SERPL-MCNC: 13 MG/DL (ref 5–25)
CALCIUM SERPL-MCNC: 9.5 MG/DL (ref 8.3–10.1)
CHLORIDE SERPL-SCNC: 100 MMOL/L (ref 100–108)
CO2 SERPL-SCNC: 25 MMOL/L (ref 21–32)
CREAT SERPL-MCNC: 1.11 MG/DL (ref 0.6–1.3)
EOSINOPHIL # BLD AUTO: 0.12 THOUSAND/ΜL (ref 0–0.61)
EOSINOPHIL NFR BLD AUTO: 1 % (ref 0–6)
ERYTHROCYTE [DISTWIDTH] IN BLOOD BY AUTOMATED COUNT: 13.4 % (ref 11.6–15.1)
GFR SERPL CREATININE-BSD FRML MDRD: 86 ML/MIN/1.73SQ M
GLUCOSE SERPL-MCNC: 93 MG/DL (ref 65–140)
HCT VFR BLD AUTO: 44.1 % (ref 36.5–49.3)
HGB BLD-MCNC: 14.9 G/DL (ref 12–17)
LIPASE SERPL-CCNC: 139 U/L (ref 73–393)
LYMPHOCYTES # BLD AUTO: 2.77 THOUSANDS/ΜL (ref 0.6–4.47)
LYMPHOCYTES NFR BLD AUTO: 21 % (ref 14–44)
MCH RBC QN AUTO: 29.7 PG (ref 26.8–34.3)
MCHC RBC AUTO-ENTMCNC: 33.8 G/DL (ref 31.4–37.4)
MCV RBC AUTO: 88 FL (ref 82–98)
MONOCYTES # BLD AUTO: 1.03 THOUSAND/ΜL (ref 0.17–1.22)
MONOCYTES NFR BLD AUTO: 8 % (ref 4–12)
NEUTROPHILS # BLD AUTO: 9.42 THOUSANDS/ΜL (ref 1.85–7.62)
NEUTS SEG NFR BLD AUTO: 70 % (ref 43–75)
NRBC BLD AUTO-RTO: 0 /100 WBCS
PLATELET # BLD AUTO: 244 THOUSANDS/UL (ref 149–390)
PMV BLD AUTO: 10.2 FL (ref 8.9–12.7)
POTASSIUM SERPL-SCNC: 3.5 MMOL/L (ref 3.5–5.3)
PROT SERPL-MCNC: 8.7 G/DL (ref 6.4–8.2)
RBC # BLD AUTO: 5.02 MILLION/UL (ref 3.88–5.62)
SODIUM SERPL-SCNC: 135 MMOL/L (ref 136–145)
WBC # BLD AUTO: 13.38 THOUSAND/UL (ref 4.31–10.16)

## 2017-12-23 PROCEDURE — 36415 COLL VENOUS BLD VENIPUNCTURE: CPT | Performed by: EMERGENCY MEDICINE

## 2017-12-23 PROCEDURE — 96374 THER/PROPH/DIAG INJ IV PUSH: CPT

## 2017-12-23 PROCEDURE — 74177 CT ABD & PELVIS W/CONTRAST: CPT

## 2017-12-23 PROCEDURE — 85025 COMPLETE CBC W/AUTO DIFF WBC: CPT | Performed by: EMERGENCY MEDICINE

## 2017-12-23 PROCEDURE — 96361 HYDRATE IV INFUSION ADD-ON: CPT

## 2017-12-23 PROCEDURE — 80053 COMPREHEN METABOLIC PANEL: CPT | Performed by: EMERGENCY MEDICINE

## 2017-12-23 PROCEDURE — 99284 EMERGENCY DEPT VISIT MOD MDM: CPT

## 2017-12-23 PROCEDURE — 83690 ASSAY OF LIPASE: CPT | Performed by: EMERGENCY MEDICINE

## 2017-12-23 RX ORDER — NAPROXEN 500 MG/1
500 TABLET ORAL 2 TIMES DAILY WITH MEALS
Qty: 10 TABLET | Refills: 0 | Status: SHIPPED | OUTPATIENT
Start: 2017-12-23 | End: 2018-12-15

## 2017-12-23 RX ORDER — METRONIDAZOLE 500 MG/1
500 TABLET ORAL ONCE
Status: COMPLETED | OUTPATIENT
Start: 2017-12-23 | End: 2017-12-23

## 2017-12-23 RX ORDER — CIPROFLOXACIN 500 MG/1
500 TABLET, FILM COATED ORAL 2 TIMES DAILY
Qty: 20 TABLET | Refills: 0 | Status: SHIPPED | OUTPATIENT
Start: 2017-12-23 | End: 2018-01-02

## 2017-12-23 RX ORDER — KETOROLAC TROMETHAMINE 30 MG/ML
15 INJECTION, SOLUTION INTRAMUSCULAR; INTRAVENOUS ONCE
Status: COMPLETED | OUTPATIENT
Start: 2017-12-23 | End: 2017-12-23

## 2017-12-23 RX ORDER — CIPROFLOXACIN 500 MG/1
500 TABLET, FILM COATED ORAL ONCE
Status: COMPLETED | OUTPATIENT
Start: 2017-12-23 | End: 2017-12-23

## 2017-12-23 RX ORDER — METRONIDAZOLE 500 MG/1
500 TABLET ORAL EVERY 8 HOURS SCHEDULED
Qty: 30 TABLET | Refills: 0 | Status: SHIPPED | OUTPATIENT
Start: 2017-12-23 | End: 2018-01-02

## 2017-12-23 RX ADMIN — IOHEXOL 100 ML: 350 INJECTION, SOLUTION INTRAVENOUS at 15:55

## 2017-12-23 RX ADMIN — CIPROFLOXACIN 500 MG: 500 TABLET, FILM COATED ORAL at 17:17

## 2017-12-23 RX ADMIN — SODIUM CHLORIDE 1000 ML: 0.9 INJECTION, SOLUTION INTRAVENOUS at 15:27

## 2017-12-23 RX ADMIN — KETOROLAC TROMETHAMINE 15 MG: 30 INJECTION, SOLUTION INTRAMUSCULAR at 15:28

## 2017-12-23 RX ADMIN — METRONIDAZOLE 500 MG: 500 TABLET ORAL at 17:17

## 2017-12-23 NOTE — DISCHARGE INSTRUCTIONS
Please return for worsening abdominal pain or if you are unable to tolerate anything by mouth  Diverticulitis   WHAT YOU NEED TO KNOW:   Diverticulitis is a condition that causes small pockets along your intestine called diverticula to become inflamed or infected  This is caused by hard bowel movements, food, or bacteria that get stuck in the pockets  DISCHARGE INSTRUCTIONS:   Return to the emergency department if:   · You have bowel movement or foul-smelling discharge leaking from your vagina or in your urine  · You have severe diarrhea  · You urinate less than usual or not at all  · You are not able to have a bowel movement  · You cannot stop vomiting  · You have severe abdominal pain, a fever, and your abdomen is larger than usual      · You have new or increased blood in your bowel movements  Contact your healthcare provider if:   · You have pain when you urinate  · Your symptoms get worse or do not go away  · You have questions or concerns about your condition or care  Medicines:   · Antibiotics  may be given to help treat a bacterial infection  · Prescription pain medicine  may be given  Ask your healthcare provider how to take this medicine safely  Some prescription pain medicines contain acetaminophen  Do not take other medicines that contain acetaminophen without talking to your healthcare provider  Too much acetaminophen may cause liver damage  Prescription pain medicine may cause constipation  Ask your healthcare provider how to prevent or treat constipation  · Take your medicine as directed  Contact your healthcare provider if you think your medicine is not helping or if you have side effects  Tell him or her if you are allergic to any medicine  Keep a list of the medicines, vitamins, and herbs you take  Include the amounts, and when and why you take them  Bring the list or the pill bottles to follow-up visits   Carry your medicine list with you in case of an emergency  Clear liquid diet:  A clear liquid diet includes any liquids that you can see through  Examples include water, ginger-laura, cranberry or apple juice, frozen fruit ice, or broth  Stay on a clear liquid diet until your symptoms are gone, or as directed  Follow up with your healthcare provider as directed: You may need to return for a colonoscopy  When your symptoms are gone, you may need a low-fat, high-fiber diet to prevent diverticulitis from developing again  Your healthcare provider or dietitian can help you create meal plans  Write down your questions so you remember to ask them during your visits  © 2017 [employer]  Information is for End User's use only and may not be sold, redistributed or otherwise used for commercial purposes  All illustrations and images included in CareNotes® are the copyrighted property of [employer]  or Esequiel Cedeño  The above information is an  only  It is not intended as medical advice for individual conditions or treatments  Talk to your doctor, nurse or pharmacist before following any medical regimen to see if it is safe and effective for you

## 2017-12-23 NOTE — ED PROVIDER NOTES
History  Chief Complaint   Patient presents with    Flank Pain     Patient reports left sided abdominal pain that now radiates into flank that started last night; stated he had chills and subjective fever; denies any recent urinary symptoms  History provided by:  Patient   used: No    Flank Pain   Pain location:  L flank  Pain quality: aching    Pain radiates to:  Does not radiate  Onset quality:  Gradual  Duration:  1 day  Timing:  Constant  Progression:  Unchanged  Chronicity:  New  Context: not previous surgeries    Relieved by:  Nothing  Worsened by:  Nothing  Ineffective treatments:  NSAIDs  Associated symptoms: chills, diarrhea and fever (Subjective)    Associated symptoms: no constipation, no cough, no dysuria, no hematuria, no nausea, no sore throat and no vomiting    Risk factors: has not had multiple surgeries        None       Past Medical History:   Diagnosis Date    Abscess     Asthma        Past Surgical History:   Procedure Laterality Date    FRACTURE SURGERY      MANDIBLE FRACTURE SURGERY         Family History   Problem Relation Age of Onset    Diabetes Maternal Aunt      I have reviewed and agree with the history as documented  Social History   Substance Use Topics    Smoking status: Current Every Day Smoker     Packs/day: 0 20     Years: 15 00     Types: Cigarettes    Smokeless tobacco: Never Used    Alcohol use No        Review of Systems   Constitutional: Positive for chills and fever (Subjective)  HENT: Negative for rhinorrhea and sore throat  Respiratory: Negative for cough  Gastrointestinal: Positive for diarrhea  Negative for abdominal distention, abdominal pain, anal bleeding, blood in stool, constipation, nausea, rectal pain and vomiting  Genitourinary: Positive for flank pain  Negative for dysuria, frequency, hematuria and urgency  Musculoskeletal: Negative for back pain  Skin: Negative for pallor and rash     All other systems reviewed and are negative  Physical Exam  ED Triage Vitals [12/23/17 1435]   Temperature Pulse Respirations Blood Pressure SpO2   99 3 °F (37 4 °C) (!) 119 18 138/72 97 %      Temp Source Heart Rate Source Patient Position - Orthostatic VS BP Location FiO2 (%)   Oral Monitor Sitting Right arm --      Pain Score       7           Orthostatic Vital Signs  Vitals:    12/23/17 1435 12/23/17 1632   BP: 138/72 118/57   Pulse: (!) 119 102   Patient Position - Orthostatic VS: Sitting        Physical Exam   Constitutional: He is oriented to person, place, and time  He appears well-developed and well-nourished  No distress  HENT:   Head: Normocephalic  Mouth/Throat: Oropharynx is clear and moist and mucous membranes are normal    Neck: Normal range of motion  Neck supple  Cardiovascular: Normal rate, regular rhythm, normal heart sounds and intact distal pulses  Exam reveals no gallop and no friction rub  No murmur heard  Pulmonary/Chest: Effort normal and breath sounds normal  No respiratory distress  He has no wheezes  He has no rales  Abdominal: Soft  Normal appearance and bowel sounds are normal  He exhibits no distension and no mass  There is no hepatosplenomegaly  There is tenderness in the left upper quadrant and left lower quadrant  There is no rigidity, no rebound, no guarding, no CVA tenderness, no tenderness at McBurney's point and negative Nicole's sign  Lymphadenopathy:     He has no cervical adenopathy  Neurological: He is alert and oriented to person, place, and time  Skin: Skin is warm, dry and intact  No rash noted  No pallor  Nursing note and vitals reviewed        ED Medications  Medications   ketorolac (TORADOL) injection 15 mg (15 mg Intravenous Given 12/23/17 1528)   sodium chloride 0 9 % bolus 1,000 mL (0 mL Intravenous Stopped 12/23/17 1632)   iohexol (OMNIPAQUE) 350 MG/ML injection (MULTI-DOSE) 100 mL (100 mL Intravenous Given 12/23/17 1555)   ciprofloxacin (CIPRO) tablet 500 mg (500 mg Oral Given 12/23/17 1717)   metroNIDAZOLE (FLAGYL) tablet 500 mg (500 mg Oral Given 12/23/17 1717)       Diagnostic Studies  Results Reviewed     Procedure Component Value Units Date/Time    Comprehensive metabolic panel [[id number]]  (Abnormal) Collected:  12/23/17 1520    Lab Status:  Final result Specimen:  Blood from Arm, Right Updated:  12/23/17 1542     Sodium 135 (L) mmol/L      Potassium 3 5 mmol/L      Chloride 100 mmol/L      CO2 25 mmol/L      Anion Gap 10 mmol/L      BUN 13 mg/dL      Creatinine 1 11 mg/dL      Glucose 93 mg/dL      Calcium 9 5 mg/dL      AST 23 U/L      ALT 24 U/L      Alkaline Phosphatase 99 U/L      Total Protein 8 7 (H) g/dL      Albumin 3 7 g/dL      Total Bilirubin 0 54 mg/dL      eGFR 86 ml/min/1 73sq m     Narrative:         National Kidney Disease Education Program recommendations are as follows:  GFR calculation is accurate only with a steady state creatinine  Chronic Kidney disease less than 60 ml/min/1 73 sq  meters  Kidney failure less than 15 ml/min/1 73 sq  meters      Lipase [[id number]]  (Normal) Collected:  12/23/17 1520    Lab Status:  Final result Specimen:  Blood from Arm, Right Updated:  12/23/17 1542     Lipase 139 u/L     CBC and differential [[id number]]  (Abnormal) Collected:  12/23/17 1520    Lab Status:  Final result Specimen:  Blood from Arm, Right Updated:  12/23/17 1533     WBC 13 38 (H) Thousand/uL      RBC 5 02 Million/uL      Hemoglobin 14 9 g/dL      Hematocrit 44 1 %      MCV 88 fL      MCH 29 7 pg      MCHC 33 8 g/dL      RDW 13 4 %      MPV 10 2 fL      Platelets 197 Thousands/uL      nRBC 0 /100 WBCs      Neutrophils Relative 70 %      Lymphocytes Relative 21 %      Monocytes Relative 8 %      Eosinophils Relative 1 %      Basophils Relative 0 %      Neutrophils Absolute 9 42 (H) Thousands/µL      Lymphocytes Absolute 2 77 Thousands/µL      Monocytes Absolute 1 03 Thousand/µL      Eosinophils Absolute 0 12 Thousand/µL      Basophils Absolute 0 04 Thousands/µL                  CT abdomen pelvis with contrast   Final Result by Roxanne Hester MD (12/23 1610)      Focal inflammatory changes about a single posteriorly located descending colonic diverticulum in keeping with acute diverticulitis  A small adjacent dot of free air in keeping with a microperforation, typical of diverticulitis  No prominent free air to    indicate mando perforation  No pericolonic abscess         Workstation performed: [id number]                    Procedures  Procedures       Phone Contacts  ED Phone Contact    ED Course  ED Course as of Dec 23 1720   Sat Dec 23, 2017   1623 Surgery paged  [phone] Discussed case with Dr Shakira Stearns  He states pt can be given option of staying for obs for IV abx or sent home with oral meds  Pt given this option and wants to go home  Pt states he will return if symptoms worsen  MDM  Number of Diagnoses or Management Options  Diagnosis management comments: Left flank pain - Will check CBC as marker of infection, CMP to r/o hepatitis/biliary disease, lipase to r/o pancreatitis, CT A/P to r/o colitis/diverticulitis  Amount and/or Complexity of Data Reviewed  Clinical lab tests: ordered and reviewed  Tests in the radiology section of CPT®: ordered and reviewed      CritCare Time    Disposition  Final diagnoses:   Diverticulitis - with microperforation     Time reflects when diagnosis was documented in both MDM as applicable and the Disposition within this note     Time User Action Codes Description Comment    12/23/2017  4:25 PM Judit Castellanos [K57 92] Diverticulitis     12/23/2017  4:25 PM Franklin Guadarrama [K57 92] Diverticulitis with microperforation      ED Disposition     ED Disposition Condition Comment    Discharge  Carly Gaston discharge to home/self care      Condition at discharge: Good        Follow-up Information     Follow up With Specialties Details Why [facility]  Schedule an appointment as soon as possible for a visit in 3 days For follow up Faisal Lopez [address]  [phone]          Patient's Medications   Discharge Prescriptions    CIPROFLOXACIN (CIPRO) 500 MG TABLET    Take 1 tablet by mouth 2 (two) times a day for 10 days       Start Date: 12/23/2017End Date: 1/2/2018       Order Dose: 500 mg       Quantity: 20 tablet    Refills: 0    METRONIDAZOLE (FLAGYL) 500 MG TABLET    Take 1 tablet by mouth every 8 (eight) hours for 10 days       Start Date: 12/23/2017End Date: 1/2/2018       Order Dose: 500 mg       Quantity: 30 tablet    Refills: 0    NAPROXEN (NAPROSYN) 500 MG TABLET    Take 1 tablet by mouth 2 (two) times a day with meals       Start Date: 12/23/2017End Date: --       Order Dose: 500 mg       Quantity: 10 tablet    Refills: 0     No discharge procedures on file      ED Provider  Electronically Signed by           Loida Talbert 24, DO  12/23/17 3484

## 2018-01-12 VITALS
SYSTOLIC BLOOD PRESSURE: 128 MMHG | RESPIRATION RATE: 20 BRPM | DIASTOLIC BLOOD PRESSURE: 90 MMHG | HEIGHT: 71 IN | WEIGHT: 279.05 LBS | HEART RATE: 100 BPM | BODY MASS INDEX: 39.06 KG/M2 | TEMPERATURE: 99 F

## 2018-01-12 VITALS
DIASTOLIC BLOOD PRESSURE: 70 MMHG | WEIGHT: 271.17 LBS | TEMPERATURE: 98.6 F | BODY MASS INDEX: 38.82 KG/M2 | SYSTOLIC BLOOD PRESSURE: 132 MMHG | HEART RATE: 80 BPM | HEIGHT: 70 IN

## 2018-01-12 NOTE — PROCEDURES
Procedures by Guido Ahmadi MD at  12/23/2016  2:26 PM      Author:  Guido Ahmadi MD Service:  Surgery-General Author Type:  Physician     Filed:  12/23/2016  2:28 PM Date of Service:  12/23/2016  2:26 PM Status:  Signed     :  Guido Ahmadi MD (Physician)         Procedure Orders:       1  INCISION AND DRAINAGE [[id number]] ordered by Guido Ahmadi MD at 12/23/16 1426                 Post-procedure Diagnoses:       1  Cellulitis of left lower extremity [L03 116]       2  Abscess [L02 91]                   Incision and Drainage  Date/Time: 12/23/2016 2:27 PM  Performed by: Yahir Valentin by: Maribel Arreaga     Patient location:  Bedside  Consent:     Consent obtained:  Verbal    Consent given by:  Patient    Risks discussed:  Infection and incomplete drainage  Universal protocol:     Patient identity confirmed:  Verbally with patient and arm band  Location:     Type:  Abscess    Location:  Lower extremity    Lower extremity location: Left thigh  Pre-procedure details:     Skin preparation:  Betadine  Anesthesia (see MAR for exact dosages): Anesthesia method:  Local infiltration    Local anesthetic:  Lidocaine 1% w/o epi  Procedure details:     Complexity:  Intermediate    Needle aspiration: no      Incision types:  Elliptical    Scalpel blade:  15    Approach:  Open    Incision depth:  Subcutaneous and skin    Wound management:  Probed and deloculated    Drainage:  Purulent    Drainage amount: Moderate    Wound treatment:  Packing placed    Packing materials:  1/2 in gauze  Post-procedure details:     Patient tolerance of procedure:   Tolerated well, no immediate complications                     Received for:Fran Ann MD  Dec 23 2016  2:29PM [facility] Standard Time

## 2018-01-15 NOTE — MISCELLANEOUS
Message   Recorded as Task   Date: 07/17/2017 05:30 PM, Created By: System   Task Name: Verify External Doc   Assigned To: Janine Ojeda   Regarding Patient: Gaby Ford, Status: Active   Comment:    System - 17 Jul 2017 5:30 PM     To: Kelechi Fairbanks    Recipient DirectID: [email or web address]    From:     Sender DirectID: [email or web address]  Patti De La Fuente - 19 Jul 2017 9:41 AM     TASK EDITED  Per [employer], patient has no health insurance = patient has been seen by Dr Lucero Estrada within the last year  Called and left message for patient to call [facility], if needed OR to contact our office (or [facility]) if/when he obtains health insurance  Active Problems    1  Asthma (493 90) (J45 909)   2  Current every day smoker (305 1) (F17 200)   3  Encounter for recheck of abscess following incision and drainage (V67 09) (Z09)   4  GERD (gastroesophageal reflux disease) (530 81) (K21 9)   5  Impaired fasting glucose (790 21) (R73 01)   6  Morbid obesity (278 01) (E66 01)   7  MRSA infection (041 12) (A49 02)   8  Open wound of left thigh, initial encounter (890 0) (S71 102A)   9  Smoking addiction (305 1) (F17 200)    Current Meds   1  Albuterol Sulfate  MCG/ACT AERS; Therapy: (Recorded:13Swv6075) to Recorded   2   Tums CHEW;   Therapy: (Recorded:72Llg9718) to Recorded    Signatures   Electronically signed by : Rosie Castro, ; Jul 19 2017  9:42AM EST                       (Author)

## 2018-01-16 NOTE — MISCELLANEOUS
Reason For Visit  Reason For Visit Free Text Note Form: Assistance with obtaining Insurance     Case Management Documentation [facility]:   Information obtained from the patient and medical record  Patient's financial status employed, no medical insurance and Part TIme VIA  He is also dealing with additional issues such as chronic/terminal disease  Action Plan: information provided and PATHS/Financial Counselor/Medicine Program  plan reviewed  Progress Note  SW has met with pt as per Provider request to assist pt with obtaining coverage as he needs a diagnostic coloscopy and possible further medical care  Pt seen and he reports he has previously applied for MA via PATHS for a past hospitalization but was turned down for excess income Pt instructed to reapply again for most recent hospitalization Pt agrees to same Nate made to [facility] [address] ad spoke with Harsha Wallace  She encouraged pt to come to the office for assistance completing same  Pt agreed to do this later this week  Call also made to Joao Vela our overing Financial counselor for [address] [place] [address] and to let her know about the diagnostic colonoscopy which will be required  Request made for Avi Felix to call pt back  SW has also reviewed with pt the Medicine Program and with his permission manuel velazquez made to [facility] the Medicine Program for assistance as well  SW to remain available for assistance as needed  Active Problems    1  Asthma (493 90) (J45 909)   2  Diverticulitis of colon (562 11) (K57 32)   3  Diverticulosis of large intestine without hemorrhage (562 10) (K57 30)   4  GERD (gastroesophageal reflux disease) (530 81) (K21 9)   5  Impaired fasting glucose (790 21) (R73 01)   6  Morbid obesity (278 01) (E66 01)   7  Onychomycosis (110 1) (B35 1)    Current Meds   1  Albuterol Sulfate  MCG/ACT AERS; Therapy: (Recorded:18Mij0992) to Recorded   2  Ciprofloxacin 500 MG TABS; TAKE 1 TABLET TWICE DAILY;    Therapy: (Recorded:60Jly3090) to Recorded   3  Flagyl 500 MG Oral Tablet (MetroNIDAZOLE); take 1 PO 3x/day for 14 days; Therapy: (Recorded:05Eaa6694) to Recorded   4  Tums CHEW;   Therapy: (Recorded:51Loh1442) to Recorded   5  Ventolin  (90 Base) MCG/ACT Inhalation Aerosol Solution; INHALE 2 PUFFS   EVERY 4 HOURS AS NEEDED FOR COUGH AND WHEEZE;   Therapy: (Recorded:16Bhc5485) to Recorded    Allergies    1  No Known Drug Allergies    2  Animal dander - Cats   3  Animal dander - Dogs   4   Pollen    Future Appointments    Date/Time Provider Specialty Site   08/14/2017 10:00 AM Specialty Clinic, General Surgery  [address]     Signatures   Electronically signed by : Nataliya Dunlap LCSW; Jul 25 2017 11:48AM EST                       (Author)

## 2018-05-29 ENCOUNTER — APPOINTMENT (EMERGENCY)
Dept: RADIOLOGY | Facility: HOSPITAL | Age: 33
End: 2018-05-29

## 2018-05-29 ENCOUNTER — HOSPITAL ENCOUNTER (EMERGENCY)
Facility: HOSPITAL | Age: 33
Discharge: HOME | End: 2018-05-29
Attending: EMERGENCY MEDICINE | Admitting: EMERGENCY MEDICINE

## 2018-05-29 VITALS
BODY MASS INDEX: 38.74 KG/M2 | OXYGEN SATURATION: 98 % | SYSTOLIC BLOOD PRESSURE: 142 MMHG | TEMPERATURE: 97.9 F | RESPIRATION RATE: 20 BRPM | WEIGHT: 270 LBS | HEART RATE: 93 BPM | DIASTOLIC BLOOD PRESSURE: 80 MMHG

## 2018-05-29 DIAGNOSIS — J20.9 ACUTE BRONCHITIS: Primary | ICD-10-CM

## 2018-05-29 PROCEDURE — 71046 X-RAY EXAM CHEST 2 VIEWS: CPT

## 2018-05-29 PROCEDURE — 99283 EMERGENCY DEPT VISIT LOW MDM: CPT

## 2018-05-29 RX ORDER — AZITHROMYCIN 250 MG/1
TABLET, FILM COATED ORAL
Qty: 6 TABLET | Refills: 0 | Status: SHIPPED | OUTPATIENT
Start: 2018-05-29 | End: 2018-06-02

## 2018-05-29 RX ORDER — GUAIFENESIN 600 MG
600 TABLET, EXTENDED RELEASE 12 HR ORAL 2 TIMES DAILY
Qty: 10 TABLET | Refills: 0 | Status: SHIPPED | OUTPATIENT
Start: 2018-05-29 | End: 2018-06-03

## 2018-05-29 RX ORDER — ALBUTEROL SULFATE 90 UG/1
2 AEROSOL, METERED RESPIRATORY (INHALATION) EVERY 4 HOURS PRN
Qty: 1 INHALER | Refills: 0 | Status: SHIPPED | OUTPATIENT
Start: 2018-05-29 | End: 2019-05-29

## 2018-05-29 RX ORDER — FLUTICASONE PROPIONATE 50 MCG
1 SPRAY, SUSPENSION (ML) NASAL DAILY
Qty: 16 G | Refills: 0 | Status: SHIPPED | OUTPATIENT
Start: 2018-05-29 | End: 2019-10-02 | Stop reason: SDUPTHER

## 2018-05-29 NOTE — ED PROVIDER NOTES
History  Chief Complaint   Patient presents with    Cough     Reports cough, rib pain, and sob x 2 days  History provided by:  Patient  URI   Presenting symptoms: congestion, cough, rhinorrhea and sore throat    Presenting symptoms: no ear pain, no facial pain, no fatigue and no fever    Congestion:     Location:  Nasal    Interferes with sleep: no      Interferes with eating/drinking: no    Cough:     Cough characteristics:  Dry    Severity:  Moderate    Onset quality:  Gradual    Duration:  3 days    Timing:  Constant    Progression:  Unchanged    Chronicity:  New  Sore throat:     Severity:  Moderate    Duration:  3 days  Severity:  Moderate  Onset quality:  Gradual  Duration:  3 days  Timing:  Constant  Progression:  Worsening  Chronicity:  New  Relieved by:  Nothing  Worsened by:  Nothing  Ineffective treatments:  None tried  Associated symptoms: wheezing    Associated symptoms: no arthralgias, no headaches, no myalgias, no neck pain, no sinus pain, no sneezing and no swollen glands        Prior to Admission Medications   Prescriptions Last Dose Informant Patient Reported? Taking?   naproxen (NAPROSYN) 500 mg tablet   No No   Sig: Take 1 tablet by mouth 2 (two) times a day with meals      Facility-Administered Medications: None       Past Medical History:   Diagnosis Date    Abscess     Asthma        Past Surgical History:   Procedure Laterality Date    FRACTURE SURGERY      MANDIBLE FRACTURE SURGERY         Family History   Problem Relation Age of Onset    Diabetes Maternal Aunt      I have reviewed and agree with the history as documented  Social History   Substance Use Topics    Smoking status: Current Every Day Smoker     Packs/day: 0 20     Years: 15 00     Types: Cigarettes    Smokeless tobacco: Never Used    Alcohol use No        Review of Systems   Constitutional: Positive for chills  Negative for activity change, appetite change, fatigue and fever     HENT: Positive for congestion, postnasal drip, rhinorrhea and sore throat  Negative for dental problem, ear pain, sinus pain, sneezing, trouble swallowing and voice change  Eyes: Negative for pain and redness  Respiratory: Positive for cough and wheezing  Negative for chest tightness and shortness of breath  Cardiovascular: Negative for chest pain, palpitations and leg swelling  Gastrointestinal: Negative for abdominal pain, blood in stool, constipation, diarrhea, nausea and vomiting  Endocrine: Negative for cold intolerance and heat intolerance  Genitourinary: Negative for dysuria, frequency and hematuria  Musculoskeletal: Negative for arthralgias, myalgias and neck pain  Skin: Negative for color change, pallor and rash  Neurological: Negative for weakness, numbness and headaches  Hematological: Does not bruise/bleed easily  Psychiatric/Behavioral: Negative for agitation, hallucinations and suicidal ideas  Physical Exam  Physical Exam   Constitutional: He is oriented to person, place, and time  He appears well-developed and well-nourished  HENT:   Mouth/Throat: No oropharyngeal exudate  TMs normal bilaterally +pharyngeal erythema +clear rhinorrhea nontender palpation of sinuses, normal looking turbinates   Eyes: Conjunctivae and EOM are normal    Neck: Normal range of motion  Neck supple  No meningeal signs   Cardiovascular: Normal rate, regular rhythm, normal heart sounds and intact distal pulses  Pulmonary/Chest: Effort normal and breath sounds normal  No respiratory distress  He has no wheezes  He has no rales  He exhibits no tenderness  Abdominal: Soft  Bowel sounds are normal  He exhibits no distension and no mass  There is no tenderness  No hernia  No cvat   Musculoskeletal: Normal range of motion  He exhibits no edema  Lymphadenopathy:     He has no cervical adenopathy  Neurological: He is alert and oriented to person, place, and time  No cranial nerve deficit  Skin: No rash noted   No erythema  No edema   Psychiatric: He has a normal mood and affect  His behavior is normal    Nursing note and vitals reviewed  Vital Signs  ED Triage Vitals [05/29/18 0935]   Temperature Pulse Respirations Blood Pressure SpO2   97 9 °F (36 6 °C) 93 20 142/80 98 %      Temp Source Heart Rate Source Patient Position - Orthostatic VS BP Location FiO2 (%)   Temporal -- -- -- --      Pain Score       2           Vitals:    05/29/18 0935   BP: 142/80   Pulse: 93       Visual Acuity      ED Medications  Medications - No data to display    Diagnostic Studies  Results Reviewed     None                 XR chest 2 views   ED Interpretation by Suresh Piper MD (05/29 1044)   Primary reviewed: no acute abnormality      Final Result by Yvette Cuenca MD (05/29 1030)      No acute cardiopulmonary disease  Workstation performed: [id number]                    Procedures  Procedures       Phone Contacts  ED Phone Contact    ED Course  ED Course as of May 29 1049   Tue May 29, 2018   1044 X-ray negative for pneumonia  Will treat for acute bronchitis  MDM  Number of Diagnoses or Management Options  Diagnosis management comments: Acute bronchitis versus pneumonia-will do chest x-ray rule out pneumonia  This negative will treat for bronchitis  CritCare Time    Disposition  Final diagnoses:   Acute bronchitis     Time reflects when diagnosis was documented in both MDM as applicable and the Disposition within this note     Time User Action Codes Description Comment    5/29/2018 10:46 AM Hosak, Lorenzo Gosselin Add [J20 [id number]] Acute bronchitis       ED Disposition     ED Disposition Condition Comment    Discharge  Arabella Macias discharge to home/self care      Condition at discharge: Good        Follow-up Information     Follow up With Specialties Details Why Contact Info    [facility]  Schedule an appointment as soon as possible for a visit in 2 days  [phone]            Patient's Medications Discharge Prescriptions    ALBUTEROL (PROVENTIL HFA,VENTOLIN HFA) 90 MCG/ACT INHALER    Inhale 2 puffs every 4 (four) hours as needed for wheezing       Start Date: 5/29/2018 End Date: 5/29/2019       Order Dose: 2 puffs       Quantity: 1 Inhaler    Refills: 0    AZITHROMYCIN (ZITHROMAX Z-JORDON) 250 MG TABLET    Take 2 tabs x 1 day, then 1 tab x 4 days       Start Date: 5/29/2018 End Date: 6/2/2018       Order Dose: --       Quantity: 6 tablet    Refills: 0    FLUTICASONE (FLONASE) 50 MCG/ACT NASAL SPRAY    1 spray into each nostril daily       Start Date: 5/29/2018 End Date: --       Order Dose: 1 spray       Quantity: 16 g    Refills: 0    GUAIFENESIN (MUCINEX) 600 MG 12 HR TABLET    Take 1 tablet (600 mg total) by mouth 2 (two) times a day for 5 days       Start Date: 5/29/2018 End Date: 6/3/2018       Order Dose: 600 mg       Quantity: 10 tablet    Refills: 0     No discharge procedures on file      ED Provider  Electronically Signed by           Nilo Serrano MD  05/29/18 7296

## 2018-05-29 NOTE — DISCHARGE INSTRUCTIONS
Acute Bronchitis   WHAT YOU NEED TO KNOW:   Acute bronchitis is swelling and irritation in the air passages of your lungs  This irritation may cause you to cough or have other breathing problems  Acute bronchitis often starts because of another illness, such as a cold or the flu  The illness spreads from your nose and throat to your windpipe and airways  Bronchitis is often called a chest cold  Acute bronchitis lasts about 3 to 6 weeks and is usually not a serious illness  Your cough can last for several weeks  DISCHARGE INSTRUCTIONS:   Return to the emergency department if:   · You cough up blood  · Your lips or fingernails turn blue  · You feel like you are not getting enough air when you breathe  Contact your healthcare provider if:   · You have a fever  · Your breathing problems do not go away or get worse  · Your cough does not get better within 4 weeks  · You have questions or concerns about your condition or care  Self-care:   · Get more rest   Rest helps your body to heal  Slowly start to do more each day  Rest when you feel it is needed  · Avoid irritants in the air  Avoid chemicals, fumes, and dust  Wear a face mask if you must work around dust or fumes  Stay inside on days when air pollution levels are high  If you have allergies, stay inside when pollen counts are high  Do not use aerosol products, such as spray-on deodorant, bug spray, and hair spray  · Do not smoke or be around others who smoke  Nicotine and other chemicals in cigarettes and cigars damages the cilia that move mucus out of your lungs  Ask your healthcare provider for information if you currently smoke and need help to quit  E-cigarettes or smokeless tobacco still contain nicotine  Talk to your healthcare provider before you use these products  · Drink liquids as directed  Liquids help keep your air passages moist and help you cough up mucus   You may need to drink more liquids when you have acute bronchitis  Ask how much liquid to drink each day and which liquids are best for you  · Use a humidifier or vaporizer  Use a cool mist humidifier or a vaporizer to increase air moisture in your home  This may make it easier for you to breathe and help decrease your cough  Decrease risk for acute bronchitis:   · Get the vaccinations you need  Ask your healthcare provider if you should get vaccinated against the flu or pneumonia  · Prevent the spread of germs  You can decrease your risk of acute bronchitis and other illnesses by doing the following:     [facility] your hands often with soap and water  Carry germ-killing hand lotion or gel with you  You can use the lotion or gel to clean your hands when soap and water are not available  ¨ Do not touch your eyes, nose, or mouth unless you have washed your hands first     ¨ Always cover your mouth when you cough to prevent the spread of germs  It is best to cough into a tissue or your shirt sleeve instead of into your hand  Ask those around you cover their mouths when they cough  ¨ Try to avoid people who have a cold or the flu  If you are sick, stay away from others as much as possible  Medicines: Your healthcare provider may  give you any of the following:  · Ibuprofen or acetaminophen  are medicines that help lower your fever  They are available without a doctor's order  Ask your healthcare provider which medicine is right for you  Ask how much to take and how often to take it  Follow directions  These medicines can cause stomach bleeding if not taken correctly  Ibuprofen can cause kidney damage  Do not take ibuprofen if you have kidney disease, an ulcer, or allergies to aspirin  Acetaminophen can cause liver damage  Do not take more than 4,000 milligrams in 24 hours  · Decongestants  help loosen mucus in your lungs and make it easier to cough up  This can help you breathe easier  · Cough suppressants  decrease your urge to cough   If your cough produces mucus, do not take a cough suppressant unless your healthcare provider tells you to  Your healthcare provider may suggest that you take a cough suppressant at night so you can rest     · Inhalers  may be given  Your healthcare provider may give you one or more inhalers to help you breathe easier and cough less  An inhaler gives your medicine to open your airways  Ask your healthcare provider to show you how to use your inhaler correctly  · Take your medicine as directed  Contact your healthcare provider if you think your medicine is not helping or if you have side effects  Tell him of her if you are allergic to any medicine  Keep a list of the medicines, vitamins, and herbs you take  Include the amounts, and when and why you take them  Bring the list or the pill bottles to follow-up visits  Carry your medicine list with you in case of an emergency  Follow up with your healthcare provider as directed:  Write down questions you have so you will remember to ask them during your follow-up visits  © 2017 [employer] Information is for End User's use only and may not be sold, redistributed or otherwise used for commercial purposes  All illustrations and images included in CareNotes® are the copyrighted property of [employer]  or [employer]  The above information is an  only  It is not intended as medical advice for individual conditions or treatments  Talk to your doctor, nurse or pharmacist before following any medical regimen to see if it is safe and effective for you

## 2018-10-28 ENCOUNTER — HOSPITAL ENCOUNTER (EMERGENCY)
Facility: HOSPITAL | Age: 33
Discharge: HOME | End: 2018-10-29
Attending: EMERGENCY MEDICINE

## 2018-10-28 DIAGNOSIS — S16.1XXA CERVICAL MUSCLE STRAIN, INITIAL ENCOUNTER: Primary | ICD-10-CM

## 2018-10-28 DIAGNOSIS — G44.209 TENSION HEADACHE: ICD-10-CM

## 2018-10-28 PROCEDURE — 99283 EMERGENCY DEPT VISIT LOW MDM: CPT

## 2018-10-29 VITALS
BODY MASS INDEX: 38.63 KG/M2 | SYSTOLIC BLOOD PRESSURE: 168 MMHG | RESPIRATION RATE: 18 BRPM | TEMPERATURE: 97.7 F | HEIGHT: 71 IN | DIASTOLIC BLOOD PRESSURE: 86 MMHG | WEIGHT: 275.9 LBS | HEART RATE: 88 BPM | OXYGEN SATURATION: 98 %

## 2018-10-29 RX ORDER — IBUPROFEN 400 MG/1
800 TABLET ORAL ONCE
Status: COMPLETED | OUTPATIENT
Start: 2018-10-29 | End: 2018-10-29

## 2018-10-29 RX ORDER — IBUPROFEN 400 MG/1
TABLET ORAL
Status: DISCONTINUED
Start: 2018-10-29 | End: 2018-10-29 | Stop reason: HOSPADM

## 2018-10-29 RX ORDER — ONDANSETRON 4 MG/1
TABLET, ORALLY DISINTEGRATING ORAL
Status: DISCONTINUED
Start: 2018-10-29 | End: 2018-10-29 | Stop reason: HOSPADM

## 2018-10-29 RX ORDER — DIAZEPAM 5 MG/1
TABLET ORAL
Status: DISCONTINUED
Start: 2018-10-29 | End: 2018-10-29 | Stop reason: HOSPADM

## 2018-10-29 RX ORDER — ONDANSETRON 4 MG/1
8 TABLET, ORALLY DISINTEGRATING ORAL ONCE
Status: COMPLETED | OUTPATIENT
Start: 2018-10-29 | End: 2018-10-29

## 2018-10-29 RX ORDER — DIAZEPAM 5 MG/1
5 TABLET ORAL ONCE
Status: COMPLETED | OUTPATIENT
Start: 2018-10-29 | End: 2018-10-29

## 2018-10-29 RX ADMIN — IBUPROFEN 800 MG: 400 TABLET ORAL at 00:21

## 2018-10-29 RX ADMIN — ONDANSETRON 8 MG: 4 TABLET, ORALLY DISINTEGRATING ORAL at 00:20

## 2018-10-29 RX ADMIN — DIAZEPAM 5 MG: 5 TABLET ORAL at 00:21

## 2018-10-29 NOTE — ED PROVIDER NOTES
History  Chief Complaint   Patient presents with    Nausea     nausea & then he coughed & felt a sharp pain in the back of his neck & his head just started pounding     26 y/o male c/o intermittent nausea, headache, and neck pain/"soreness" after coughing episode at home PTA  Patient states that he had a slight headache prior to going to sleep for night  He woke up after becoming more nauseated, then coughed after attempting to vomit  He states that he felt a "sharp pain" in his neck after which radiated up into his scalp/head  He denies any fever, joint pain, and/or rash  He denies any sick contacts  He did not take anything for his pain at home  He admits to getting frequent headaches, but denies any migraine history  Prior to Admission Medications   Prescriptions Last Dose Informant Patient Reported? Taking? albuterol (PROVENTIL HFA,VENTOLIN HFA) 90 mcg/act inhaler   No No   Sig: Inhale 2 puffs every 4 (four) hours as needed for wheezing   fluticasone (FLONASE) 50 mcg/act nasal spray   No No   Si spray into each nostril daily   naproxen (NAPROSYN) 500 mg tablet   No No   Sig: Take 1 tablet by mouth 2 (two) times a day with meals      Facility-Administered Medications: None       Past Medical History:   Diagnosis Date    Abscess     Asthma     MRSA infection     of an abscess       Past Surgical History:   Procedure Laterality Date    FRACTURE SURGERY      MANDIBLE FRACTURE SURGERY         Family History   Problem Relation Age of Onset    Diabetes Maternal Aunt      I have reviewed and agree with the history as documented  Social History   Substance Use Topics    Smoking status: Current Every Day Smoker     Packs/day: 0 00     Years: 15 00     Types: Cigarettes    Smokeless tobacco: Never Used    Alcohol use No        Review of Systems   Gastrointestinal: Positive for nausea  Musculoskeletal: Positive for neck pain     All other systems reviewed and are negative  Physical Exam  Physical Exam   Constitutional: He is oriented to person, place, and time  He appears well-developed and well-nourished  HENT:   Head: Normocephalic and atraumatic  Eyes: Pupils are equal, round, and reactive to light  EOM are normal    Neck: Normal range of motion  Neck supple  No JVD present  Cardiovascular: Normal rate and regular rhythm  Pulmonary/Chest: Effort normal and breath sounds normal    Abdominal: Soft  Bowel sounds are normal    Musculoskeletal: Normal range of motion  Lymphadenopathy:     He has no cervical adenopathy  Neurological: He is alert and oriented to person, place, and time  Skin: Skin is warm and dry  Capillary refill takes less than 2 seconds  Psychiatric: He has a normal mood and affect  Vitals reviewed  Vital Signs  ED Triage Vitals   Temperature Pulse Respirations Blood Pressure SpO2   10/28/18 2353 10/28/18 2353 10/28/18 2353 10/28/18 2357 10/28/18 2353   97 7 °F (36 5 °C) 96 20 136/92 99 %      Temp Source Heart Rate Source Patient Position - Orthostatic VS BP Location FiO2 (%)   10/28/18 2353 -- 10/28/18 2353 10/28/18 2353 --   Tympanic  Sitting Left arm       Pain Score       10/28/18 2353       5           Vitals:    10/28/18 2353 10/28/18 2357 10/29/18 0157   BP:  136/92 168/86   Pulse: 96  88   Patient Position - Orthostatic VS: Sitting Sitting Lying       Visual Acuity      ED Medications  Medications   ondansetron (ZOFRAN-ODT) dispersible tablet 8 mg (8 mg Oral Given 10/29/18 0020)   ibuprofen (MOTRIN) tablet 800 mg (800 mg Oral Given 10/29/18 0021)   diazepam (VALIUM) tablet 5 mg (5 mg Oral Given 10/29/18 0021)       Diagnostic Studies  Results Reviewed     None                 No orders to display              Procedures  Procedures       Phone Contacts  ED Phone Contact    ED Course  ED Course as of Oct 29 0303   Mon Oct 29, 2018   0149 Patient sleeping comfortably upon reexamination  Patient denies any current pain  MDM  CritCare Time    Disposition  Final diagnoses:   Cervical muscle strain, initial encounter   Tension headache     Time reflects when diagnosis was documented in both MDM as applicable and the Disposition within this note     Time User Action Codes Description Comment    10/29/2018  1:44 AM Toraymundo Eduardo [S16  1XXA] Cervical muscle strain, initial encounter     10/29/2018  1:45 AM Jaylan Eduardo [[id number]] Tension headache       ED Disposition     ED Disposition Condition Comment    Discharge  Marshal namrata discharge to home/self care  Condition at discharge: Good        Follow-up Information     Follow up With Specialties Details Why Contact Info Additional Information    [address] [facility] Go to  [address], [address] [address]  [address] [place] [address], [address], [address] [place] [address], [phone]          Discharge Medication List as of 10/29/2018  1:45 AM      CONTINUE these medications which have NOT CHANGED    Details   albuterol (PROVENTIL HFA,VENTOLIN HFA) 90 mcg/act inhaler Inhale 2 puffs every 4 (four) hours as needed for wheezing, Starting Tue 5/29/2018, Until Wed 5/29/2019, Print      fluticasone (FLONASE) 50 mcg/act nasal spray 1 spray into each nostril daily, Starting Tue 5/29/2018, Print      naproxen (NAPROSYN) 500 mg tablet Take 1 tablet by mouth 2 (two) times a day with meals, Starting Sat 12/23/2017, Print           No discharge procedures on file      ED Provider  Electronically Signed by           Robbie Preston DO  10/29/18 700 [address],   10/29/18 7672

## 2018-10-29 NOTE — DISCHARGE INSTRUCTIONS
Cervical Strain   WHAT YOU NEED TO KNOW:   A cervical strain is a stretched or torn muscle or tendon in your neck  Tendons are strong tissues that connect muscles to bones  Common causes of cervical strains include a car accident, a fall, or a sports injury  DISCHARGE INSTRUCTIONS:   Return to the emergency department if:   · You have pain or numbness from your shoulder down to your hand  · You have problems with your vision, hearing, or balance  · You feel confused or cannot concentrate  · You have problems with movement and strength  Contact your healthcare provider if:   · You have increased swelling or pain in your neck  · You have questions or concerns about your condition or care  Medicines: You may need any of the following:  · Acetaminophen  decreases pain and fever  It is available without a doctor's order  Ask how much to take and how often to take it  Follow directions  Read the labels of all other medicines you are using to see if they also contain acetaminophen, or ask your doctor or pharmacist  Acetaminophen can cause liver damage if not taken correctly  Do not use more than 4 grams (4,000 milligrams) total of acetaminophen in one day  · NSAIDs , such as ibuprofen, help decrease swelling, pain, and fever  This medicine is available with or without a doctor's order  NSAIDs can cause stomach bleeding or kidney problems in certain people  If you take blood thinner medicine, always ask your healthcare provider if NSAIDs are safe for you  Always read the medicine label and follow directions  · Muscle relaxers  help decrease pain and muscle spasms  · Prescription pain medicine  may be given  Ask your healthcare provider how to take this medicine safely  Some prescription pain medicines contain acetaminophen  Do not take other medicines that contain acetaminophen without talking to your healthcare provider  Too much acetaminophen may cause liver damage   Prescription pain medicine may cause constipation  Ask your healthcare provider how to prevent or treat constipation  · Take your medicine as directed  Contact your healthcare provider if you think your medicine is not helping or if you have side effects  Tell him or her if you are allergic to any medicine  Keep a list of the medicines, vitamins, and herbs you take  Include the amounts, and when and why you take them  Bring the list or the pill bottles to follow-up visits  Carry your medicine list with you in case of an emergency  Manage your symptoms:   · Apply heat  on your neck for 15 to 20 minutes, 4 to 6 times a day or as directed  Heat helps decrease pain, stiffness, and muscle spasms  · Begin gentle neck exercises  as soon as you can move your neck without pain  Exercises will help decrease stiffness and improve the strength and movement of your neck  Ask your healthcare provider what kind of exercises you should do  · Gradually return to your usual activities as directed  Stop if you have pain  Avoid activities that can cause more damage to your neck, such as heavy lifting or strenuous exercise  · Sleep without a pillow  to help decrease pain  Instead, roll a small towel tightly and place it under your neck  · Go to physical therapy as directed  A physical therapist teaches you exercises to help improve movement and strength, and to decrease pain  Prevent neck injury:   · Drive safely  Make sure everyone in your car wears a seatbelt  A seatbelt can save your life if you are in an accident  Do not use your cell phone when you are driving  This could distract you and cause an accident  Pull over if you need to make a call or send a text message  · Wear helmets, lifejackets, and protective gear  Always wear a helmet when you ride a bike or motorcycle, go skiing, or play sports that could cause a head injury  Wear protective equipment when you play sports   Wear a lifejacket when you are on a boat or doing water sports  Follow up with your healthcare provider as directed: You may be referred to an orthopedist or physical therapies  Write down your questions so you remember to ask them during your visits  © 2017 [employer]  Information is for End User's use only and may not be sold, redistributed or otherwise used for commercial purposes  All illustrations and images included in CareNotes® are the copyrighted property of [employer]  or [employer]  The above information is an  only  It is not intended as medical advice for individual conditions or treatments  Talk to your doctor, nurse or pharmacist before following any medical regimen to see if it is safe and effective for you  Acute Headache   AMBULATORY CARE:   An acute headache  is pain or discomfort that starts suddenly and gets worse quickly  You may have an acute headache only when you feel stress or eat certain foods  Other acute headache pain can happen every day, and sometimes several times a day  The cause of an acute headache may not be known  It may be triggered by stress, fatigue, hormones, food, or trauma  Common types of acute headache:   · Tension headache  is the most common type of headache  These headaches typically occur in the late afternoon and go away by evening  The pain is usually mild or moderate  You may have problems tolerating bright light or loud noise  The pain is usually across the forehead or in the back of the head, often only on one side  These headaches may occur every day  · Migraine headaches  cause moderate or severe pain  The headache generally lasts from 1 to 3 days and tends to come back  Pain is usually on only one side, but it may change sides  Migraines often occur in the temple, the back of the head, or behind the eye  The pain may throb or be sharp and steady  · A migraine with aura  means you see or feel something before a migraine   You may see a small spot surrounded by bright zigzag lines  Other signs or symptoms may follow the aura  · Cluster headache  pain is usually only on one side  It often causes severe pain, and can last for 30 minutes to 2 hours  These headaches may occur 1 or 2 times each day, more often at night  The pain may wake you  Seek care immediately if:   · You have severe pain  · You have numbness or weakness on one side of your face or body  · You have a headache that occurs after a blow to the head, a fall, or other trauma  · You have a headache, are forgetful or confused, or have trouble speaking  · You have a headache, stiff neck, and a fever  Contact your healthcare provider if:   · You have a constant headache and are vomiting  · You have a headache each day that does not get better, even after treatment  · You have changes in your headaches, or new symptoms that occur when you have a headache  · You have questions or concerns about your condition or care  Treatment:   · Medicine  may be given to decrease pain  The medicine your healthcare provider recommends will depend on the kind of headaches you have  You will need to take prescription headache medicines as directed to prevent a problem called rebound headache  These headaches happen with regular use of pain relievers for headache disorders  NSAIDs or acetaminophen may help some kinds of headaches  · Biofeedback  may help you learn how to change stress reactions  For example, you learn to slow your heart rate when you become upset  You may also learn to prevent certain headaches by combining heat with relaxation  · Cognitive behavior therapy,  or stress management, may be used with other therapies to prevent headaches  Manage your symptoms:   · Apply heat or ice  on the headache area  Use a heat or ice pack  For an ice pack, you can also put crushed ice in a plastic bag  Cover the pack or bag with a towel before you apply it to your skin   Ice and heat both help decrease pain, and heat helps decrease muscle spasms  Apply heat for 20 to 30 minutes every 2 hours  Apply ice for 15 to 20 minutes every hour  Apply heat or ice for as long and for as many days as directed  You may alternate heat and ice  · Relax your muscles  Lie down in a comfortable position and close your eyes  Relax your muscles slowly  Start at your toes and work your way up your body  · Keep a record of your headaches  Write down when your headaches start and stop  Include your symptoms and what you were doing when the headache began  Record what you ate or drank for 24 hours before the headache started  Describe the pain and where it hurts  Keep track of what you did to treat your headache and if it worked  Prevent an acute headache:   · Avoid anything that triggers an acute headache  Examples include exposure to chemicals, going to high altitude, or not getting enough sleep  Create a regular sleep routine  Go to sleep at the same time and wake up at the same time each day  Do not use electronic devices before bedtime  These may trigger a headache or prevent you from sleeping well  · Do not smoke  Nicotine and other chemicals in cigarettes and cigars can trigger an acute headache or make it worse  Ask your healthcare provider for information if you currently smoke and need help to quit  E-cigarettes or smokeless tobacco still contain nicotine  Talk to your healthcare provider before you use these products  · Limit alcohol as directed  Alcohol can trigger an acute headache or make it worse  If you have cluster headaches, do not drink alcohol during an episode  For other types of headaches, ask your healthcare provider if it is safe for you to drink alcohol  Ask how much is safe for you to drink, and how often  · Exercise as directed  Exercise can reduce tension and help with headache pain  Aim for 30 minutes of physical activity on most days of the week   Your healthcare provider can help you create an exercise plan  · Eat a variety of healthy foods  Healthy foods include fruits, vegetables, low-fat dairy products, lean meats, fish, whole grains, and cooked beans  Your healthcare provider or dietitian can help you create meals plans if you need to avoid foods that trigger headaches  Follow up with your healthcare provider as directed:  Bring your headache record with you when you see your healthcare provider  Write down your questions so you remember to ask them during your visits  © 2017 [employer]  Information is for End User's use only and may not be sold, redistributed or otherwise used for commercial purposes  All illustrations and images included in CareNotes® are the copyrighted property of [employer]  or [employer]  The above information is an  only  It is not intended as medical advice for individual conditions or treatments  Talk to your doctor, nurse or pharmacist before following any medical regimen to see if it is safe and effective for you

## 2018-12-15 ENCOUNTER — HOSPITAL ENCOUNTER (INPATIENT)
Facility: HOSPITAL | Age: 33
LOS: 2 days | Discharge: HOME | DRG: 872 | End: 2018-12-17
Attending: EMERGENCY MEDICINE | Admitting: INTERNAL MEDICINE

## 2018-12-15 ENCOUNTER — APPOINTMENT (EMERGENCY)
Dept: CT IMAGING | Facility: HOSPITAL | Age: 33
DRG: 872 | End: 2018-12-15

## 2018-12-15 DIAGNOSIS — A41.9 SEPSIS (HCC): ICD-10-CM

## 2018-12-15 DIAGNOSIS — K57.92 ACUTE DIVERTICULITIS: Primary | ICD-10-CM

## 2018-12-15 DIAGNOSIS — K57.32 DIVERTICULITIS OF LARGE INTESTINE WITHOUT PERFORATION OR ABSCESS WITHOUT BLEEDING: ICD-10-CM

## 2018-12-15 LAB
ALBUMIN SERPL BCP-MCNC: 3.8 G/DL (ref 3.5–5)
ALP SERPL-CCNC: 102 U/L (ref 46–116)
ALT SERPL W P-5'-P-CCNC: 25 U/L (ref 12–78)
ANION GAP SERPL CALCULATED.3IONS-SCNC: 10 MMOL/L (ref 4–13)
AST SERPL W P-5'-P-CCNC: 25 U/L (ref 5–45)
BASOPHILS # BLD AUTO: 0.08 THOUSANDS/ΜL (ref 0–0.1)
BASOPHILS NFR BLD AUTO: 1 % (ref 0–1)
BILIRUB SERPL-MCNC: 0.24 MG/DL (ref 0.2–1)
BILIRUB UR QL STRIP: NEGATIVE
BUN SERPL-MCNC: 15 MG/DL (ref 5–25)
CALCIUM SERPL-MCNC: 9.3 MG/DL (ref 8.3–10.1)
CHLORIDE SERPL-SCNC: 102 MMOL/L (ref 100–108)
CLARITY UR: CLEAR
CO2 SERPL-SCNC: 26 MMOL/L (ref 21–32)
COLOR UR: YELLOW
COLOR, POC: YELLOW
CREAT SERPL-MCNC: 1.04 MG/DL (ref 0.6–1.3)
EOSINOPHIL # BLD AUTO: 0.24 THOUSAND/ΜL (ref 0–0.61)
EOSINOPHIL NFR BLD AUTO: 2 % (ref 0–6)
ERYTHROCYTE [DISTWIDTH] IN BLOOD BY AUTOMATED COUNT: 12.9 % (ref 11.6–15.1)
GFR SERPL CREATININE-BSD FRML MDRD: 93 ML/MIN/1.73SQ M
GLUCOSE SERPL-MCNC: 95 MG/DL (ref 65–140)
GLUCOSE UR STRIP-MCNC: NEGATIVE MG/DL
HCT VFR BLD AUTO: 45 % (ref 36.5–49.3)
HGB BLD-MCNC: 14.7 G/DL (ref 12–17)
HGB UR QL STRIP.AUTO: NEGATIVE
IMM GRANULOCYTES # BLD AUTO: 0.08 THOUSAND/UL (ref 0–0.2)
IMM GRANULOCYTES NFR BLD AUTO: 1 % (ref 0–2)
KETONES UR STRIP-MCNC: NEGATIVE MG/DL
LACTATE SERPL-SCNC: 0.9 MMOL/L (ref 0.5–2)
LEUKOCYTE ESTERASE UR QL STRIP: NEGATIVE
LIPASE SERPL-CCNC: 217 U/L (ref 73–393)
LYMPHOCYTES # BLD AUTO: 2.81 THOUSANDS/ΜL (ref 0.6–4.47)
LYMPHOCYTES NFR BLD AUTO: 22 % (ref 14–44)
MCH RBC QN AUTO: 29.5 PG (ref 26.8–34.3)
MCHC RBC AUTO-ENTMCNC: 32.7 G/DL (ref 31.4–37.4)
MCV RBC AUTO: 90 FL (ref 82–98)
MONOCYTES # BLD AUTO: 0.68 THOUSAND/ΜL (ref 0.17–1.22)
MONOCYTES NFR BLD AUTO: 5 % (ref 4–12)
NEUTROPHILS # BLD AUTO: 9.19 THOUSANDS/ΜL (ref 1.85–7.62)
NEUTS SEG NFR BLD AUTO: 69 % (ref 43–75)
NITRITE UR QL STRIP: NEGATIVE
NRBC BLD AUTO-RTO: 0 /100 WBCS
PH UR STRIP.AUTO: 6 [PH] (ref 4.5–8)
PLATELET # BLD AUTO: 275 THOUSANDS/UL (ref 149–390)
PMV BLD AUTO: 9.5 FL (ref 8.9–12.7)
POTASSIUM SERPL-SCNC: 4.1 MMOL/L (ref 3.5–5.3)
PROT SERPL-MCNC: 8.1 G/DL (ref 6.4–8.2)
PROT UR STRIP-MCNC: NEGATIVE MG/DL
RBC # BLD AUTO: 4.98 MILLION/UL (ref 3.88–5.62)
SODIUM SERPL-SCNC: 138 MMOL/L (ref 136–145)
SP GR UR STRIP.AUTO: 1.01 (ref 1–1.03)
UROBILINOGEN UR QL STRIP.AUTO: 0.2 E.U./DL
WBC # BLD AUTO: 13.08 THOUSAND/UL (ref 4.31–10.16)

## 2018-12-15 PROCEDURE — 99222 1ST HOSP IP/OBS MODERATE 55: CPT | Performed by: INTERNAL MEDICINE

## 2018-12-15 PROCEDURE — 74177 CT ABD & PELVIS W/CONTRAST: CPT

## 2018-12-15 PROCEDURE — 96361 HYDRATE IV INFUSION ADD-ON: CPT

## 2018-12-15 PROCEDURE — 83605 ASSAY OF LACTIC ACID: CPT | Performed by: EMERGENCY MEDICINE

## 2018-12-15 PROCEDURE — 83690 ASSAY OF LIPASE: CPT | Performed by: EMERGENCY MEDICINE

## 2018-12-15 PROCEDURE — 80053 COMPREHEN METABOLIC PANEL: CPT | Performed by: EMERGENCY MEDICINE

## 2018-12-15 PROCEDURE — 85025 COMPLETE CBC W/AUTO DIFF WBC: CPT | Performed by: EMERGENCY MEDICINE

## 2018-12-15 PROCEDURE — 96375 TX/PRO/DX INJ NEW DRUG ADDON: CPT

## 2018-12-15 PROCEDURE — 99285 EMERGENCY DEPT VISIT HI MDM: CPT

## 2018-12-15 PROCEDURE — 96365 THER/PROPH/DIAG IV INF INIT: CPT

## 2018-12-15 PROCEDURE — 36415 COLL VENOUS BLD VENIPUNCTURE: CPT | Performed by: EMERGENCY MEDICINE

## 2018-12-15 PROCEDURE — 81002 URINALYSIS NONAUTO W/O SCOPE: CPT | Performed by: EMERGENCY MEDICINE

## 2018-12-15 PROCEDURE — 87040 BLOOD CULTURE FOR BACTERIA: CPT | Performed by: EMERGENCY MEDICINE

## 2018-12-15 PROCEDURE — 81003 URINALYSIS AUTO W/O SCOPE: CPT

## 2018-12-15 RX ORDER — NICOTINE 21 MG/24HR
1 PATCH, TRANSDERMAL 24 HOURS TRANSDERMAL DAILY
Status: DISCONTINUED | OUTPATIENT
Start: 2018-12-16 | End: 2018-12-17 | Stop reason: HOSPADM

## 2018-12-15 RX ORDER — CIPROFLOXACIN 2 MG/ML
400 INJECTION, SOLUTION INTRAVENOUS EVERY 12 HOURS
Status: DISCONTINUED | OUTPATIENT
Start: 2018-12-15 | End: 2018-12-16

## 2018-12-15 RX ORDER — ACETAMINOPHEN 325 MG/1
650 TABLET ORAL EVERY 6 HOURS PRN
Status: DISCONTINUED | OUTPATIENT
Start: 2018-12-15 | End: 2018-12-17 | Stop reason: HOSPADM

## 2018-12-15 RX ORDER — METRONIDAZOLE 500 MG/1
500 TABLET ORAL EVERY 8 HOURS SCHEDULED
Status: DISCONTINUED | OUTPATIENT
Start: 2018-12-15 | End: 2018-12-17 | Stop reason: HOSPADM

## 2018-12-15 RX ORDER — KETOROLAC TROMETHAMINE 30 MG/ML
15 INJECTION, SOLUTION INTRAMUSCULAR; INTRAVENOUS ONCE
Status: COMPLETED | OUTPATIENT
Start: 2018-12-15 | End: 2018-12-15

## 2018-12-15 RX ORDER — ALBUTEROL SULFATE 90 UG/1
2 AEROSOL, METERED RESPIRATORY (INHALATION) EVERY 4 HOURS PRN
Status: DISCONTINUED | OUTPATIENT
Start: 2018-12-15 | End: 2018-12-17 | Stop reason: HOSPADM

## 2018-12-15 RX ORDER — KETOROLAC TROMETHAMINE 30 MG/ML
15 INJECTION, SOLUTION INTRAMUSCULAR; INTRAVENOUS EVERY 6 HOURS PRN
Status: DISCONTINUED | OUTPATIENT
Start: 2018-12-15 | End: 2018-12-17 | Stop reason: HOSPADM

## 2018-12-15 RX ADMIN — PIPERACILLIN SODIUM AND TAZOBACTAM SODIUM 3.38 G: 36; 4.5 INJECTION, POWDER, FOR SOLUTION INTRAVENOUS at 16:32

## 2018-12-15 RX ADMIN — SODIUM CHLORIDE 1000 ML: 0.9 INJECTION, SOLUTION INTRAVENOUS at 14:56

## 2018-12-15 RX ADMIN — IOHEXOL 100 ML: 350 INJECTION, SOLUTION INTRAVENOUS at 15:42

## 2018-12-15 RX ADMIN — CIPROFLOXACIN 400 MG: 2 INJECTION, SOLUTION INTRAVENOUS at 19:29

## 2018-12-15 RX ADMIN — KETOROLAC TROMETHAMINE 15 MG: 30 INJECTION, SOLUTION INTRAMUSCULAR at 14:57

## 2018-12-15 RX ADMIN — METRONIDAZOLE 500 MG: 500 TABLET ORAL at 21:43

## 2018-12-15 NOTE — ASSESSMENT & PLAN NOTE
This is his 3rd episode of diverticulitis  The 1st episode happened in July 2017, the 2nd in December 2017  His current episode is fortunately still uncomplicated without abscess or perforation  Start clear liquid diet  Start Cipro 400 mg IV Q 12, Flagyl 500 mg p o  Q 8  Start Tylenol for mild pain, Toradol for moderate pain, morphine for severe pain  I told him that after this discharge he needs to discussed treatment options with surgery given this is his 3rd episode    Consult Nutrition for dietary recommendation

## 2018-12-15 NOTE — ASSESSMENT & PLAN NOTE
Patient met sepsis criteria with tachycardia and leukocytosis on admission  Clinically he is improving  Follow up blood culture results  Reassess in fabiola bhakta

## 2018-12-15 NOTE — H&P
H&P- Samy Nicole 1985, 26 y o  male MRN: [id number]    Unit/Bed#: [address] 227-01 Encounter: [id number]    Primary Care Provider: No primary care provider on file  Date and time admitted to hospital: 12/15/2018  2:25 PM        Diverticulitis of large intestine without perforation or abscess without bleeding   Assessment & Plan    This is his 3rd episode of diverticulitis  The 1st episode happened in July 2017, the 2nd in December 2017  His current episode is fortunately still uncomplicated without abscess or perforation  Start clear liquid diet  Start Cipro 400 mg IV Q 12, Flagyl 500 mg p o  Q 8  Start Tylenol for mild pain, Toradol for moderate pain, morphine for severe pain  I told him that after this discharge he needs to discussed treatment options with surgery given this is his 3rd episode  Consult Nutrition for dietary recommendation     Sepsis due to other etiology [facility])   Assessment & Plan    Patient met sepsis criteria with tachycardia and leukocytosis on admission  Clinically he is improving  Follow up blood culture results  Reassess in a m      Mild intermittent asthma without complication   Assessment & Plan    Without exacerbation  Lungs are clear  May continue albuterol pump as needed     Current smoker   Assessment & Plan    Apply nicotine patch 14 mg daily             VTE Prophylaxis: Enoxaparin (Lovenox)    Code Status:  Full code  POLST: There is no POLST form on file for this patient (pre-hospital)    Anticipated Length of Stay:  Patient will be admitted on an Inpatient basis with an anticipated length of stay of  at least 2 midnights  Justification for Hospital Stay:  Diverticulitis    Total Time for Visit, including Counseling / Coordination of Care: 30 minutes  Greater than 50% of this total time spent on direct patient counseling and coordination of care      Chief Complaint:   Abdominal pain     History of Present Illness:    Samy Nicole is a 26 y o  male who presents with left lower quadrant abdominal pain for 2 days  This came suddenly, described as severe, achy, with no fever/chills/nausea or vomiting  This felt similar to his previous diverticulitis in December 2017  He also had an episode of diverticulitis in July 2017  He had poor appetite appetite since yesterday  Although he reports having regular bowel movements without need for straining, he admits following diet without vegetables  The only vegetable he eats are potatoes  During his last bout of diverticulitis, he was advised to follow up with Gastroenterology and undergo a colonoscopy  However due to lack of insurance this never pushed through  Review of Systems:    Review of Systems   Constitutional: Negative  HENT: Negative  Eyes: Negative  Respiratory: Negative  Cardiovascular: Negative  Gastrointestinal: Positive for abdominal pain  Genitourinary: Negative  Musculoskeletal: Negative  Neurological: Negative  Psychiatric/Behavioral: Negative  All other systems reviewed and are negative  Past Medical and Surgical History:     Past Medical History:   Diagnosis Date    Abscess     Asthma     Diverticulitis     MRSA infection     of an abscess       Past Surgical History:   Procedure Laterality Date    FRACTURE SURGERY      MANDIBLE FRACTURE SURGERY         Meds/Allergies:    Prior to Admission medications    Medication Sig Start Date End Date Taking? Authorizing Provider   albuterol (PROVENTIL HFA,VENTOLIN HFA) 90 mcg/act inhaler Inhale 2 puffs every 4 (four) hours as needed for wheezing 5/29/18 5/29/19  Omar Hannah MD   fluticasone [facility]) 50 mcg/act nasal spray 1 spray into each nostril daily 5/29/18   Omar Hannah MD   naproxen (NAPROSYN) 500 mg tablet Take 1 tablet by mouth 2 (two) times a day with meals 12/23/17 12/15/18  Shannan Guadarrama,      I have reviewed home medications with patient personally      Allergies: No Known Allergies    Social History:     Marital Status: Significant Other   Occupation:  employed  Patient Pre-hospital Living Situation:  Independent  Patient Pre-hospital Level of Mobility:  Independent  Patient Pre-hospital Diet Restrictions:  None  Substance Use History:   History   Alcohol Use No     History   Smoking Status    Current Every Day Smoker    Packs/day: 0 50    Years: 15 00    Types: Cigarettes   Smokeless Tobacco    Never Used     History   Drug Use    Frequency: 7 0 times per week    Types: Marijuana       Family History:    Family History   Problem Relation Age of Onset    Diabetes Maternal Aunt        Physical Exam:     Vitals:   Blood Pressure: 135/86 (12/15/18 1802)  Pulse: 96 (12/15/18 1802)  Temperature: 98 6 °F (37 °C) (12/15/18 1802)  Temp Source: Tympanic (12/15/18 1802)  Respirations: 19 (12/15/18 1802)  Height: 5' 11" (180 3 cm) (12/15/18 1430)  Weight - Scale: 125 kg (275 lb) (12/15/18 1430)  SpO2: 98 % (12/15/18 1802)    Physical Exam   Constitutional: He is oriented to person, place, and time  Obese body habitus   HENT:   Head: Normocephalic and atraumatic  Eyes: No scleral icterus  Neck: No JVD present  Cardiovascular: Regular rhythm  Exam reveals no gallop and no friction rub  No murmur heard  Pulmonary/Chest: Effort normal  No respiratory distress  He has no wheezes  He has no rales  Abdominal: Soft  Abdomen is large flat with positive bowel sounds in all 4 quadrant  He has no guarding  He does have mild tenderness in the left lower quadrant   Musculoskeletal: He exhibits no edema or deformity  Neurological: He is alert and oriented to person, place, and time  Skin: Skin is warm and dry  Psychiatric: He has a normal mood and affect  His behavior is normal    Vitals reviewed  Additional Data:     Lab Results: I have personally reviewed pertinent reports          Results from last 7 days  Lab Units 12/15/18  1456   WBC Thousand/uL 13 08*   HEMOGLOBIN g/dL 14 7   HEMATOCRIT % 45 0   PLATELETS Thousands/uL 275   NEUTROS PCT % 69   LYMPHS PCT % 22   MONOS PCT % 5   EOS PCT % 2       Results from last 7 days  Lab Units 12/15/18  1456   SODIUM mmol/L 138   POTASSIUM mmol/L 4 1   CHLORIDE mmol/L 102   CO2 mmol/L 26   BUN mg/dL 15   CREATININE mg/dL 1 04   ANION GAP mmol/L 10   CALCIUM mg/dL 9 3   ALBUMIN g/dL 3 8   TOTAL BILIRUBIN mg/dL 0 24   ALK PHOS U/L 102   ALT U/L 25   AST U/L 25   GLUCOSE RANDOM mg/dL 95                   Results from last 7 days  Lab Units 12/15/18  1620   LACTIC ACID mmol/L 0 9       Imaging: I have personally reviewed pertinent reports  and I have personally reviewed pertinent films in PACS    CT abdomen pelvis with contrast   ED Interpretation by Alona Esparza MD (12/15 1316)   Acute diverticulitis at the junction of the descending and sigmoid colon  No organized fluid collection to suggest abscess  Final Result by Yolanda Brown MD (12/15 0427)      Acute diverticulitis at the junction of the descending and sigmoid colon  No organized fluid collection to suggest abscess  The study was marked in [facility] for immediate notification  Workstation performed: [id number]             EKG, Pathology, and Other Studies Reviewed on Admission:   CT of the abdomen on 12/23/2017 and 07/14/2017 reviewed  [facility] Reviewed: Yes     ** Please Note: This note has been constructed using a voice recognition system   **

## 2018-12-15 NOTE — ED PROVIDER NOTES
History  Chief Complaint   Patient presents with    Abdominal Pain     Lower adbominal pain began yesterday, denies N/VD, reports pain with bowel movements  Hx of diverticulitis       History provided by:  Patient  Abdominal Pain   Pain location:  LLQ and suprapubic  Pain quality: pressure    Pain radiates to:  Does not radiate  Pain severity:  Severe  Onset quality:  Gradual  Duration:  2 days  Timing:  Constant  Progression:  Worsening  Chronicity:  New  Relieved by:  Nothing  Worsened by:  Position changes and palpation  Ineffective treatments:  None tried  Associated symptoms: no anorexia, no belching, no chest pain, no chills, no constipation, no cough, no diarrhea, no dysuria, no fatigue, no fever, no hematemesis, no hematochezia, no hematuria, no melena, no nausea, no shortness of breath, no sore throat and no vomiting    Risk factors: has not had multiple surgeries        Prior to Admission Medications   Prescriptions Last Dose Informant Patient Reported? Taking? albuterol (PROVENTIL HFA,VENTOLIN HFA) 90 mcg/act inhaler More than a month at Unknown time  No No   Sig: Inhale 2 puffs every 4 (four) hours as needed for wheezing   fluticasone (FLONASE) 50 mcg/act nasal spray More than a month at Unknown time  No No   Si spray into each nostril daily      Facility-Administered Medications: None       Past Medical History:   Diagnosis Date    Abscess     Asthma     Diverticulitis     MRSA infection     of an abscess       Past Surgical History:   Procedure Laterality Date    FRACTURE SURGERY      MANDIBLE FRACTURE SURGERY         Family History   Problem Relation Age of Onset    Diabetes Maternal Aunt      I have reviewed and agree with the history as documented      Social History   Substance Use Topics    Smoking status: Current Every Day Smoker     Packs/day: 0 50     Years: 15 00     Types: Cigarettes    Smokeless tobacco: Never Used    Alcohol use No        Review of Systems   Constitutional: Negative for activity change, appetite change, chills, fatigue and fever  HENT: Negative for congestion, dental problem, ear pain, rhinorrhea and sore throat  Eyes: Negative for pain and redness  Respiratory: Negative for cough, chest tightness, shortness of breath and wheezing  Cardiovascular: Negative for chest pain and palpitations  Gastrointestinal: Positive for abdominal pain  Negative for anal bleeding, anorexia, blood in stool, constipation, diarrhea, hematemesis, hematochezia, melena, nausea and vomiting  Endocrine: Negative for cold intolerance and heat intolerance  Genitourinary: Negative for discharge, dysuria, frequency, hematuria, penile pain, scrotal swelling and testicular pain  Musculoskeletal: Negative for arthralgias and myalgias  Skin: Negative for color change, pallor and rash  Neurological: Negative for weakness and numbness  Hematological: Does not bruise/bleed easily  Psychiatric/Behavioral: Negative for agitation, hallucinations and suicidal ideas  Physical Exam  Physical Exam   Constitutional: He is oriented to person, place, and time  He appears well-developed and well-nourished  HENT:   Mouth/Throat: No oropharyngeal exudate  TMs normal bilaterally no pharyngeal erythema no rhinorrhea nontender palpation of sinuses, normal looking turbinates   Eyes: Conjunctivae and EOM are normal    Neck: Normal range of motion  Neck supple  No meningeal signs   Cardiovascular: Normal rate, regular rhythm, normal heart sounds and intact distal pulses  Pulmonary/Chest: Effort normal and breath sounds normal  No respiratory distress  He has no wheezes  He has no rales  He exhibits no tenderness  Abdominal: Soft  Bowel sounds are normal  He exhibits no distension and no mass  There is tenderness  There is guarding  No hernia  No cvat   Musculoskeletal: Normal range of motion  He exhibits no edema  Lymphadenopathy:     He has no cervical adenopathy  Neurological: He is alert and oriented to person, place, and time  No cranial nerve deficit  Skin: No rash noted  No erythema  No edema   Psychiatric: He has a normal mood and affect  His behavior is normal    Nursing note and vitals reviewed  Vital Signs  ED Triage Vitals   Temperature Pulse Respirations Blood Pressure SpO2   12/15/18 1430 12/15/18 1430 12/15/18 1430 12/15/18 1430 12/15/18 1430   98 4 °F (36 9 °C) (!) 110 18 122/84 98 %      Temp Source Heart Rate Source Patient Position - Orthostatic VS BP Location FiO2 (%)   12/15/18 1430 12/15/18 1430 12/15/18 1634 12/15/18 1430 --   Oral Monitor Lying Right arm       Pain Score       12/15/18 1430       Worst Possible Pain           Vitals:    12/15/18 1430 12/15/18 1634   BP: 122/84 109/60   Pulse: (!) 110 88   Patient Position - Orthostatic VS:  Lying       Visual Acuity      ED Medications  Medications   sodium chloride 0 9 % bolus 1,000 mL (0 mL Intravenous Stopped 12/15/18 1606)   ketorolac (TORADOL) injection 15 mg (15 mg Intravenous Given 12/15/18 1457)   iohexol (OMNIPAQUE) 350 MG/ML injection (MULTI-DOSE) 100 mL (100 mL Intravenous Given 12/15/18 1542)   piperacillin-tazobactam (ZOSYN) 3 375 g in sodium chloride 0 9 % 50 mL IVPB (0 g Intravenous Stopped 12/15/18 1707)       Diagnostic Studies  Results Reviewed     Procedure Component Value Units Date/Time    Lactic acid x2 Q2H [[id number]]  (Normal) Collected:  12/15/18 1620    Lab Status:  Final result Specimen:  Blood from Arm, Left Updated:  12/15/18 1647     LACTIC ACID 0 9 mmol/L     Narrative:         Result may be elevated if tourniquet was used during collection  Blood culture #1 [[id number]] Collected:  12/15/18 1620    Lab Status: In process Specimen:  Blood from Arm, Right Updated:  12/15/18 1628    Blood culture #2 [[id number]] Collected:  12/15/18 1620    Lab Status:   In process Specimen:  Blood from Arm, Left Updated:  12/15/18 1628    ED Urine Macroscopic [[id number]] Collected:  12/15/18 1607    Lab Status:  Final result Specimen:  Urine Updated:  12/15/18 1553     Color, UA Yellow     Clarity, UA Clear     pH, UA 6 0     Leukocytes, UA Negative     Nitrite, UA Negative     Protein, UA Negative mg/dl      Glucose, UA Negative mg/dl      Ketones, UA Negative mg/dl      Urobilinogen, UA 0 2 E U /dl      Bilirubin, UA Negative     Blood, UA Negative     Specific Gravity, UA 1 015    Narrative:       CLINITEK RESULT    POCT urinalysis dipstick [[id number]]  (Normal) Resulted:  12/15/18 1553    Lab Status:  Final result Specimen:  Urine Updated:  12/15/18 1553     Color, UA yellow    Comprehensive metabolic panel [[id number]] Collected:  12/15/18 1456    Lab Status:  Final result Specimen:  Blood from Arm, Right Updated:  12/15/18 1519     Sodium 138 mmol/L      Potassium 4 1 mmol/L      Chloride 102 mmol/L      CO2 26 mmol/L      ANION GAP 10 mmol/L      BUN 15 mg/dL      Creatinine 1 04 mg/dL      Glucose 95 mg/dL      Calcium 9 3 mg/dL      AST 25 U/L      ALT 25 U/L      Alkaline Phosphatase 102 U/L      Total Protein 8 1 g/dL      Albumin 3 8 g/dL      Total Bilirubin 0 24 mg/dL      eGFR 93 ml/min/1 73sq m     Narrative:         National Kidney Disease Education Program recommendations are as follows:  GFR calculation is accurate only with a steady state creatinine  Chronic Kidney disease less than 60 ml/min/1 73 sq  meters  Kidney failure less than 15 ml/min/1 73 sq  meters      Lipase [[id number]]  (Normal) Collected:  12/15/18 1456    Lab Status:  Final result Specimen:  Blood from Arm, Right Updated:  12/15/18 1519     Lipase 217 u/L     CBC and differential [[id number]]  (Abnormal) Collected:  12/15/18 1456    Lab Status:  Final result Specimen:  Blood from Arm, Right Updated:  12/15/18 1504     WBC 13 08 (H) Thousand/uL      RBC 4 98 Million/uL      Hemoglobin 14 7 g/dL      Hematocrit 45 0 %      MCV 90 fL      MCH 29 5 pg      MCHC 32 7 g/dL      RDW 12 9 %      MPV 9 5 fL Platelets 807 Thousands/uL      nRBC 0 /100 WBCs      Neutrophils Relative 69 %      Immat GRANS % 1 %      Lymphocytes Relative 22 %      Monocytes Relative 5 %      Eosinophils Relative 2 %      Basophils Relative 1 %      Neutrophils Absolute 9 19 (H) Thousands/µL      Immature Grans Absolute 0 08 Thousand/uL      Lymphocytes Absolute 2 81 Thousands/µL      Monocytes Absolute 0 68 Thousand/µL      Eosinophils Absolute 0 24 Thousand/µL      Basophils Absolute 0 08 Thousands/µL                  CT abdomen pelvis with contrast   ED Interpretation by Lorena Gregory MD (12/15 1728)   Acute diverticulitis at the junction of the descending and sigmoid colon  No organized fluid collection to suggest abscess  Final Result by Karen Mcneil MD (12/15 1710)      Acute diverticulitis at the junction of the descending and sigmoid colon  No organized fluid collection to suggest abscess  The study was marked in [facility] for immediate notification              Workstation performed: [id number]                    Procedures  CriticalCare Time  Performed by: Vidhi Wise by: Pollo Scruggs     Critical care provider statement:     Critical care time (minutes):  35    Critical care time was exclusive of:  Separately billable procedures and treating other patients and teaching time    Critical care was necessary to treat or prevent imminent or life-threatening deterioration of the following conditions:  Sepsis    Critical care was time spent personally by me on the following activities:  Blood draw for specimens, obtaining history from patient or surrogate, development of treatment plan with patient or surrogate, discussions with consultants, evaluation of patient's response to treatment, examination of patient, interpretation of cardiac output measurements, ordering and performing treatments and interventions, ordering and review of laboratory studies, ordering and review of radiographic studies, re-evaluation of patient's condition and review of old charts           Phone Contacts  ED Phone Contact    ED Course                         Initial Sepsis Screening     [address] Name 12/15/18 1550                Is the patient's history suggestive of a new or worsening infection? (!)  Yes (Proceed)  -        Suspected source of infection acute abdominal infection  -MH        Are two or more of the following signs & symptoms of infection both present and new to the patient?         Indicate SIRS criteria Tachycardia > 90 bpm;Leukocytosis (WBC > [address] IJL)  -        If the answer is yes to both questions, suspicion of sepsis is present          If severe sepsis is present AND tissue hypoperfusion perists in the hour after fluid resuscitation or lactate > 4, the patient meets criteria for SEPTIC SHOCK          Are any of the following organ dysfunction criteria present within 6 hours of suspected infection and SIRS criteria that are NOT considered to be chronic conditions?         Organ dysfunction          Date of presentation of severe sepsis          Time of presentation of severe sepsis          Tissue hypoperfusion persists in the hour after crystalloid fluid administration, evidenced, by either:          Was hypotension present within one hour of the conclusion of crystalloid fluid administration?           Date of presentation of septic shock          Time of presentation of septic shock            User Key  (r) = Recorded By, (t) = Taken By, (c) = Cosigned By    Initials Name Provider Type    Brian Saha MD Physician                  MDM  Number of Diagnoses or Management Options  Diagnosis management comments: Acute abd pain- will do labs, urine dip, ct a/p to r/o acute intra0-abdominal pathology, prn pain meds, reassess    CritCare Time    Disposition  Final diagnoses:   Acute diverticulitis   Sepsis ([facility] [address] )     Time reflects when diagnosis was documented in both MDM as applicable and the Disposition within this note     Time User Action Codes Description Comment    12/15/2018  5:32 PM Samuel Mariee Add [K57 92] Acute diverticulitis     12/15/2018  5:32 PM KelsieDavid Ani Add [A41 9] Sepsis [facility])       ED Disposition     ED Disposition Condition Comment    Admit  Case was discussed with Dr Roque Pelaez and the patient's admission status was agreed to be Admission Status: inpatient status to the service of Dr Roque Pelaez   Follow-up Information    None         Patient's Medications   Discharge Prescriptions    No medications on file     No discharge procedures on file      ED Provider  Electronically Signed by           Darshan Oswald MD  12/15/18 3630

## 2018-12-16 LAB
ANION GAP SERPL CALCULATED.3IONS-SCNC: 11 MMOL/L (ref 4–13)
BASOPHILS # BLD AUTO: 0.08 THOUSANDS/ΜL (ref 0–0.1)
BASOPHILS NFR BLD AUTO: 1 % (ref 0–1)
BUN SERPL-MCNC: 13 MG/DL (ref 5–25)
CALCIUM SERPL-MCNC: 8.4 MG/DL (ref 8.3–10.1)
CHLORIDE SERPL-SCNC: 101 MMOL/L (ref 100–108)
CO2 SERPL-SCNC: 25 MMOL/L (ref 21–32)
CREAT SERPL-MCNC: 1.1 MG/DL (ref 0.6–1.3)
EOSINOPHIL # BLD AUTO: 0.31 THOUSAND/ΜL (ref 0–0.61)
EOSINOPHIL NFR BLD AUTO: 3 % (ref 0–6)
ERYTHROCYTE [DISTWIDTH] IN BLOOD BY AUTOMATED COUNT: 12.9 % (ref 11.6–15.1)
GFR SERPL CREATININE-BSD FRML MDRD: 87 ML/MIN/1.73SQ M
GLUCOSE SERPL-MCNC: 89 MG/DL (ref 65–140)
HCT VFR BLD AUTO: 43.8 % (ref 36.5–49.3)
HGB BLD-MCNC: 14.2 G/DL (ref 12–17)
IMM GRANULOCYTES # BLD AUTO: 0.06 THOUSAND/UL (ref 0–0.2)
IMM GRANULOCYTES NFR BLD AUTO: 1 % (ref 0–2)
LYMPHOCYTES # BLD AUTO: 3.02 THOUSANDS/ΜL (ref 0.6–4.47)
LYMPHOCYTES NFR BLD AUTO: 27 % (ref 14–44)
MCH RBC QN AUTO: 29.2 PG (ref 26.8–34.3)
MCHC RBC AUTO-ENTMCNC: 32.4 G/DL (ref 31.4–37.4)
MCV RBC AUTO: 90 FL (ref 82–98)
MONOCYTES # BLD AUTO: 0.73 THOUSAND/ΜL (ref 0.17–1.22)
MONOCYTES NFR BLD AUTO: 7 % (ref 4–12)
NEUTROPHILS # BLD AUTO: 7.08 THOUSANDS/ΜL (ref 1.85–7.62)
NEUTS SEG NFR BLD AUTO: 61 % (ref 43–75)
NRBC BLD AUTO-RTO: 0 /100 WBCS
PLATELET # BLD AUTO: 262 THOUSANDS/UL (ref 149–390)
PMV BLD AUTO: 9.4 FL (ref 8.9–12.7)
POTASSIUM SERPL-SCNC: 3.9 MMOL/L (ref 3.5–5.3)
RBC # BLD AUTO: 4.86 MILLION/UL (ref 3.88–5.62)
SODIUM SERPL-SCNC: 137 MMOL/L (ref 136–145)
WBC # BLD AUTO: 11.28 THOUSAND/UL (ref 4.31–10.16)

## 2018-12-16 PROCEDURE — 85025 COMPLETE CBC W/AUTO DIFF WBC: CPT | Performed by: INTERNAL MEDICINE

## 2018-12-16 PROCEDURE — 99232 SBSQ HOSP IP/OBS MODERATE 35: CPT | Performed by: INTERNAL MEDICINE

## 2018-12-16 PROCEDURE — 80048 BASIC METABOLIC PNL TOTAL CA: CPT | Performed by: INTERNAL MEDICINE

## 2018-12-16 RX ORDER — CIPROFLOXACIN 500 MG/1
500 TABLET, FILM COATED ORAL EVERY 12 HOURS SCHEDULED
Status: DISCONTINUED | OUTPATIENT
Start: 2018-12-16 | End: 2018-12-17 | Stop reason: HOSPADM

## 2018-12-16 RX ADMIN — METRONIDAZOLE 500 MG: 500 TABLET ORAL at 14:37

## 2018-12-16 RX ADMIN — METRONIDAZOLE 500 MG: 500 TABLET ORAL at 05:32

## 2018-12-16 RX ADMIN — CIPROFLOXACIN HYDROCHLORIDE 500 MG: 500 TABLET, FILM COATED ORAL at 20:00

## 2018-12-16 RX ADMIN — KETOROLAC TROMETHAMINE 15 MG: 30 INJECTION, SOLUTION INTRAMUSCULAR at 14:37

## 2018-12-16 RX ADMIN — CIPROFLOXACIN 400 MG: 2 INJECTION, SOLUTION INTRAVENOUS at 06:00

## 2018-12-16 RX ADMIN — METRONIDAZOLE 500 MG: 500 TABLET ORAL at 21:00

## 2018-12-16 NOTE — ASSESSMENT & PLAN NOTE
This is his 3rd episode of diverticulitis  The 1st episode happened in July 2017, the 2nd in December 2017  His current episode is fortunately still uncomplicated without abscess or perforation  I told him that after this discharge he needs to discussed treatment options with surgery given this is his 3rd episode  Continue Cipro 400 mg IV Q 12, Flagyl 500 mg p o  Q 8  He still has LLQ pain and discomfort  Start Tylenol for mild pain, Toradol for moderate pain, morphine for severe pain      Consult Nutrition for dietary recommendation

## 2018-12-16 NOTE — PROGRESS NOTES
The ciprofloxacin IV has / have been converted to Oral per [facility] IV-to[facility]PO Auto-Conversion Protocol for Adults as approved by the Pharmacy and Therapeutics Committee  The patient met all eligible criteria:  1) Age = >22 years old   2) Received at least one dose of the IV form   3) Receiving at least one other scheduled oral/enteral medication   4) Tolerating an oral/enteral diet   and did not have any exclusions:   1) Critical care patient   2) Active GI bleed (IF assessing H2RAs or PPIs)   3) Continuous tube feeding (IF assessing cipro, doxycycline, levofloxacin, minocycline, rifampin, or voriconazole)   4) Receiving PO vancomycin (IF assessing metronidazole)   5) Persistent nausea and/or vomiting   6) Ileus or gastrointestinal obstruction   7) Kristian/nasogastric tube set for continuous suction   8) Specific order not to automatically convert to PO (in the order's comments or if discussed in the most recent Infectious Disease or primary team's progress notes)

## 2018-12-16 NOTE — UTILIZATION REVIEW
Initial Clinical Review    Admission: Date/Time/Statement: 12/15/18 @ 1733     Orders Placed This Encounter   Procedures    Inpatient Admission (expected length of stay for this patient is greater than two midnights)     Standing Status:   Standing     Number of Occurrences:   1     Order Specific Question:   Admitting Physician     Answer:   Delfino Mason [[id number]]     Order Specific Question:   Level of Care     Answer:   Med Surg [[id number]]     Order Specific Question:   Estimated length of stay     Answer:   More than 2 Midnights     Order Specific Question:   Certification     Answer:   I certify that inpatient services are medically necessary for this patient for a duration of greater than two midnights  See H&P and MD Progress Notes for additional information about the patient's course of treatment  ED: Date/Time/Mode of Arrival:   ED Arrival Information     Expected Arrival Acuity Means of Arrival Escorted By Service Admission Type    - 12/15/2018 14:17 Urgent Walk-In Self General Medicine Urgent    Arrival Complaint    Abdominal Pain          Chief Complaint:   Chief Complaint   Patient presents with    Abdominal Pain     Lower adbominal pain began yesterday, denies N/VD, reports pain with bowel movements  Hx of diverticulitis       History of Illness:    26 y o  male who presents with left lower quadrant abdominal pain for 2 days  This came suddenly, described as severe        poor appetite appetite since yesterday    Previous 2 episodes of diverticulitis:  July 2017  And  Dec 2017     ED Vital Signs:   ED Triage Vitals   Temperature Pulse Respirations Blood Pressure SpO2   12/15/18 1430 12/15/18 1430 12/15/18 1430 12/15/18 1430 12/15/18 1430   98 4 °F (36 9 °C) (!) 110 18 122/84 98 %      Temp Source Heart Rate Source Patient Position - Orthostatic VS BP Location FiO2 (%)   12/15/18 1430 12/15/18 1430 12/15/18 1634 12/15/18 1430 --   Oral Monitor Lying Right arm       Pain Score       12/15/18 1430 Worst Possible Pain        Wt Readings from Last 1 Encounters:   12/15/18 125 kg (275 lb)     Abnormal Labs/Diagnostic Test Results:  Wbc  13 08    11 28  Lactic acid  0 9  UA  sg  1 015    ctap -  Acute diverticulitis at the junction of the descending and sigmoid colon   No organized fluid collection to suggest abscess  ED Treatment:   Medication Administration from 12/15/2018 1417 to 12/15/2018 1756       Date/Time Order Dose Route Action Action by Comments                12/15/2018 1456 sodium chloride 0 9 % bolus 1,000 mL 1,000 mL Intravenous [address] Dakota Smith RN      12/15/2018 1457 ketorolac (TORADOL) injection 15 mg 15 mg Intravenous Given Dakota Smith RN                            12/15/2018 1632 piperacillin-tazobactam (ZOSYN) 3 375 g in sodium chloride 0 9 % 50 mL IVPB 3 375 g Intravenous New Bag Saeed Walters RN           Past Medical/Surgical History:    Active Ambulatory Problems     Diagnosis Date Noted    Abscess of left thigh 12/23/2016    Cellulitis and abscess of leg 12/23/2016    Sepsis due to other etiology ([facility] [address] ) 12/23/2016    Mild intermittent asthma without complication 63/99/0822    Abdominal pain 07/14/2017    Diverticulitis of large intestine without perforation or abscess without bleeding 07/14/2017    Diarrhea 07/14/2017    Current smoker 07/14/2017    Diverticulitis 07/17/2017     Resolved Ambulatory Problems     Diagnosis Date Noted    No Resolved Ambulatory Problems     Past Medical History:   Diagnosis Date    Abscess     Asthma     Diverticulitis     MRSA infection        Admitting Diagnosis: Abdominal pain [R10 9]  Sepsis ([facility] [address] ) [A41 9]  Acute diverticulitis [K57 92]    Assessment/Plan:   Diverticulitis of Large intestine:  Clear liquids, IV cipro,   Flagyl po,  Pain control,  IVF hydration,  Nutrition for diet education  Sepsis -  fup blood cx    Admission Orders:  Admit medical  IV cipro q 12  (changed to po tabs  q 12 on  12/16)   NPO  IVF @ 125  GI consult  Consult dietary for pt education      12/16/2018  98 6   79   105/78     Still c/o LLQ abd pain w/tenderness upon palpation    Scheduled Meds:   Current Facility-Administered Medications:  acetaminophen 650 mg Oral Q6H PRN Thai Watters MD   albuterol 2 puff Inhalation Q4H PRN Thai Watters MD   ciprofloxacin 500 mg Oral Q12H [facility] Thai Watters MD   enoxaparin 40 mg Subcutaneous Daily Thai Watters MD   ketorolac 15 mg Intravenous Q6H PRN Thai Watters MD   metroNIDAZOLE 500 mg Oral [facility] Thai Watters MD   morphine injection 2 mg Intravenous Q4H PRN Thai Watters MD   nicotine 1 patch Transdermal Daily Thai Watters MD

## 2018-12-16 NOTE — PROGRESS NOTES
Progress Note - Hulan Counter 1985, 26 y o  male MRN: [id number]    Unit/Bed#: [place] [address] [id number] Encounter: [id number]    Primary Care Provider: No primary care provider on file  Date and time admitted to hospital: 12/15/2018  2:25 PM        * Diverticulitis of large intestine without perforation or abscess without bleeding   Assessment & Plan    This is his 3rd episode of diverticulitis  The 1st episode happened in July 2017, the 2nd in December 2017  His current episode is fortunately still uncomplicated without abscess or perforation  I told him that after this discharge he needs to discussed treatment options with surgery given this is his 3rd episode  Continue Cipro 400 mg IV Q 12, Flagyl 500 mg p o  Q 8  He still has LLQ pain and discomfort  Start Tylenol for mild pain, Toradol for moderate pain, morphine for severe pain  Consult Nutrition for dietary recommendation     Sepsis due to other etiology [facility])   Assessment & Plan    Patient met sepsis criteria with tachycardia and leukocytosis on admission  Clinically he is improving  Follow up blood culture results  Reassess in a m      Mild intermittent asthma without complication   Assessment & Plan    Without exacerbation  Lungs are clear  May continue albuterol pump as needed     Current smoker   Assessment & Plan    Apply nicotine patch 14 mg daily         VTE Pharmacologic Prophylaxis:   Pharmacologic: Enoxaparin (Lovenox)  Mechanical VTE Prophylaxis in Place: No    Patient Centered Rounds:  spoke with his nurse    Time Spent for Care: 20 minutes  More than 50% of total time spent on counseling and coordination of care as described above  Current Length of Stay: 1 day(s)    Current Patient Status: Inpatient   Certification Statement: The patient will continue to require additional inpatient hospital stay due to Still with left lower quadrant    Discharge Plan:  Not ready for discharge today    Possibly in 24-48 hours    Code Status: Level 1 - Full Code      Subjective:   Still with left lower quadrant abdominal pain  "It feels like your holding your fart for a long time"  No appetite  He does not feel ready to eat solid food    Objective:     Vitals:   Temp (24hrs), Av 4 °F (36 9 °C), Min:98 1 °F (36 7 °C), Max:98 6 °F (37 °C)    Temp:  [98 1 °F (36 7 °C)-98 6 °F (37 °C)] 98 1 °F (36 7 °C)  HR:  [] 79  Resp:  [18-19] 18  BP: (105-135)/(60-86) 105/78  SpO2:  [98 %-100 %] 100 %  Body mass index is 38 35 kg/m²  Input and Output Summary (last 24 hours): Intake/Output Summary (Last 24 hours) at 18 0840  Last data filed at 12/15/18 1707   Gross per 24 hour   Intake             1050 ml   Output                0 ml   Net             1050 ml       Physical Exam:     Physical Exam   Constitutional: He appears well-developed  No distress  Obese   HENT:   Head: Normocephalic and atraumatic  Eyes: No scleral icterus  Neck: No JVD present  Cardiovascular: Regular rhythm  Exam reveals no friction rub  No murmur heard  Pulmonary/Chest: Effort normal  No respiratory distress  He has no wheezes  He has no rales  Abdominal: Soft  He exhibits no distension  Left lower quadrant tenderness    Musculoskeletal: He exhibits no edema or deformity  Neurological: He is alert  Skin: Skin is warm and dry  He is not diaphoretic  Psychiatric: He has a normal mood and affect       Additional Data:     Labs:      Results from last 7 days  Lab Units 18  0539   WBC Thousand/uL 11 28*   HEMOGLOBIN g/dL 14 2   HEMATOCRIT % 43 8   PLATELETS Thousands/uL 262   NEUTROS PCT % 61   LYMPHS PCT % 27   MONOS PCT % 7   EOS PCT % 3       Results from last 7 days  Lab Units 18  0539 12/15/18  1456   SODIUM mmol/L 137 138   POTASSIUM mmol/L 3 9 4 1   CHLORIDE mmol/L 101 102   CO2 mmol/L 25 26   BUN mg/dL 13 15   CREATININE mg/dL 1 10 1 04   ANION GAP mmol/L 11 10   CALCIUM mg/dL 8 4 9 3   ALBUMIN g/dL  --  3 8   TOTAL BILIRUBIN mg/dL  -- 0  24   ALK PHOS U/L  --  102   ALT U/L  --  25   AST U/L  --  25   GLUCOSE RANDOM mg/dL 89 95                   Results from last 7 days  Lab Units 12/15/18  1620   LACTIC ACID mmol/L 0 9           * I Have Reviewed All Lab Data Listed Above  * Additional Pertinent Lab Tests Reviewed: All Labs Within Last 24 Hours Reviewed    Imaging:    Imaging Reports Reviewed Today Include:  None  Recent Cultures (last 7 days):           Last 24 Hours Medication List:     Current Facility-Administered Medications:  acetaminophen 650 mg Oral Q6H PRN Anuel Solomon MD    albuterol 2 puff Inhalation Q4H PRN Anuel Solomon MD    ciprofloxacin 400 mg Intravenous Q12H Anuel Solomon MD Last Rate: 400 mg (12/16/18 0600)   enoxaparin 40 mg Subcutaneous Daily Anuel Solomon MD    ketorolac 15 mg Intravenous Q6H PRN Anuel Solomon MD    metroNIDAZOLE 500 mg Oral [facility] Anuel Solomon MD    morphine injection 2 mg Intravenous Q4H PRN Anuel Solomon MD    nicotine 1 patch Transdermal Daily Anuel Solomon MD         Today, Patient Was Seen By: Anuel Solomon MD    ** Please Note: Dictation voice to text software may have been used in the creation of this document   **

## 2018-12-16 NOTE — PLAN OF CARE
DISCHARGE PLANNING     Discharge to home or other facility with appropriate resources Progressing        GASTROINTESTINAL - ADULT     Maintains or returns to baseline bowel function Progressing     Maintains adequate nutritional intake Progressing        Potential for Falls     Patient will remain free of falls Progressing

## 2018-12-17 VITALS
HEIGHT: 71 IN | BODY MASS INDEX: 38.5 KG/M2 | SYSTOLIC BLOOD PRESSURE: 99 MMHG | OXYGEN SATURATION: 93 % | TEMPERATURE: 98.1 F | RESPIRATION RATE: 16 BRPM | WEIGHT: 275 LBS | DIASTOLIC BLOOD PRESSURE: 64 MMHG | HEART RATE: 79 BPM

## 2018-12-17 LAB
ANION GAP SERPL CALCULATED.3IONS-SCNC: 9 MMOL/L (ref 4–13)
BASOPHILS # BLD AUTO: 0.08 THOUSANDS/ΜL (ref 0–0.1)
BASOPHILS NFR BLD AUTO: 1 % (ref 0–1)
BUN SERPL-MCNC: 11 MG/DL (ref 5–25)
CALCIUM SERPL-MCNC: 9 MG/DL (ref 8.3–10.1)
CHLORIDE SERPL-SCNC: 101 MMOL/L (ref 100–108)
CO2 SERPL-SCNC: 28 MMOL/L (ref 21–32)
CREAT SERPL-MCNC: 1.27 MG/DL (ref 0.6–1.3)
EOSINOPHIL # BLD AUTO: 0.28 THOUSAND/ΜL (ref 0–0.61)
EOSINOPHIL NFR BLD AUTO: 2 % (ref 0–6)
ERYTHROCYTE [DISTWIDTH] IN BLOOD BY AUTOMATED COUNT: 13 % (ref 11.6–15.1)
GFR SERPL CREATININE-BSD FRML MDRD: 73 ML/MIN/1.73SQ M
GLUCOSE SERPL-MCNC: 97 MG/DL (ref 65–140)
HCT VFR BLD AUTO: 45.5 % (ref 36.5–49.3)
HGB BLD-MCNC: 14.3 G/DL (ref 12–17)
IMM GRANULOCYTES # BLD AUTO: 0.04 THOUSAND/UL (ref 0–0.2)
IMM GRANULOCYTES NFR BLD AUTO: 0 % (ref 0–2)
LYMPHOCYTES # BLD AUTO: 2.84 THOUSANDS/ΜL (ref 0.6–4.47)
LYMPHOCYTES NFR BLD AUTO: 24 % (ref 14–44)
MCH RBC QN AUTO: 29.1 PG (ref 26.8–34.3)
MCHC RBC AUTO-ENTMCNC: 31.4 G/DL (ref 31.4–37.4)
MCV RBC AUTO: 93 FL (ref 82–98)
MONOCYTES # BLD AUTO: 0.93 THOUSAND/ΜL (ref 0.17–1.22)
MONOCYTES NFR BLD AUTO: 8 % (ref 4–12)
NEUTROPHILS # BLD AUTO: 7.73 THOUSANDS/ΜL (ref 1.85–7.62)
NEUTS SEG NFR BLD AUTO: 65 % (ref 43–75)
NRBC BLD AUTO-RTO: 0 /100 WBCS
PLATELET # BLD AUTO: 265 THOUSANDS/UL (ref 149–390)
PMV BLD AUTO: 9.6 FL (ref 8.9–12.7)
POTASSIUM SERPL-SCNC: 4.1 MMOL/L (ref 3.5–5.3)
PROCALCITONIN SERPL-MCNC: <0.05 NG/ML
RBC # BLD AUTO: 4.92 MILLION/UL (ref 3.88–5.62)
SODIUM SERPL-SCNC: 138 MMOL/L (ref 136–145)
WBC # BLD AUTO: 11.9 THOUSAND/UL (ref 4.31–10.16)

## 2018-12-17 PROCEDURE — 84145 PROCALCITONIN (PCT): CPT | Performed by: PHYSICIAN ASSISTANT

## 2018-12-17 PROCEDURE — 99239 HOSP IP/OBS DSCHRG MGMT >30: CPT | Performed by: INTERNAL MEDICINE

## 2018-12-17 PROCEDURE — 85025 COMPLETE CBC W/AUTO DIFF WBC: CPT | Performed by: INTERNAL MEDICINE

## 2018-12-17 PROCEDURE — 80048 BASIC METABOLIC PNL TOTAL CA: CPT | Performed by: INTERNAL MEDICINE

## 2018-12-17 RX ORDER — CIPROFLOXACIN 500 MG/1
500 TABLET, FILM COATED ORAL EVERY 12 HOURS SCHEDULED
Qty: 16 TABLET | Refills: 0 | Status: SHIPPED | OUTPATIENT
Start: 2018-12-17 | End: 2018-12-17

## 2018-12-17 RX ORDER — CIPROFLOXACIN 500 MG/1
500 TABLET, FILM COATED ORAL EVERY 12 HOURS SCHEDULED
Qty: 16 TABLET | Refills: 0 | Status: SHIPPED | OUTPATIENT
Start: 2018-12-17 | End: 2018-12-25

## 2018-12-17 RX ORDER — METRONIDAZOLE 500 MG/1
500 TABLET ORAL EVERY 8 HOURS SCHEDULED
Qty: 24 TABLET | Refills: 0 | Status: SHIPPED | OUTPATIENT
Start: 2018-12-17 | End: 2018-12-25

## 2018-12-17 RX ADMIN — METRONIDAZOLE 500 MG: 500 TABLET ORAL at 13:00

## 2018-12-17 RX ADMIN — ENOXAPARIN SODIUM 40 MG: 40 INJECTION SUBCUTANEOUS at 08:44

## 2018-12-17 RX ADMIN — CIPROFLOXACIN HYDROCHLORIDE 500 MG: 500 TABLET, FILM COATED ORAL at 08:44

## 2018-12-17 RX ADMIN — METRONIDAZOLE 500 MG: 500 TABLET ORAL at 05:01

## 2018-12-17 NOTE — PLAN OF CARE
DISCHARGE PLANNING     Discharge to home or other facility with appropriate resources Completed        GASTROINTESTINAL - ADULT     Maintains or returns to baseline bowel function Completed     Maintains adequate nutritional intake Completed        Potential for Falls     Patient will remain free of falls Completed

## 2018-12-17 NOTE — PHYSICIAN ADVISOR
Current patient class: Inpatient  The patient is currently on Hospital Day: 2 at [facility]      The patient was admitted to the hospital at [phone] on 12/15/18 for the following diagnosis:  Abdominal pain [R10 9]  Sepsis ([address] ) [A41 9]  Acute diverticulitis [K57 92]       There is documentation in the medical record of an expected length of stay of at least 2 midnights  The patient is therefore expected to satisfy the 2 midnight benchmark and given the 2 midnight presumption is appropriate for INPATIENT ADMISSION  Given this expectation of a satisfying stay, CMS instructs us that the patient is most often appropriate for inpatient admission under part A provided medical necessity is documented in the chart  After review of the relevant documentation, labs, vital signs and test results, the patient is appropriate for INPATIENT ADMISSION  Admission to the hospital as an inpatient is a complex decision making process which requires the practitioner to consider the patients presenting complaint, history and physical examination and all relevant testing  With this in mind, in this case, the patient was deemed appropriate for INPATIENT ADMISSION  After review of the documentation and testing available at the time of the admission I concur with this clinical determination of medical necessity  Rationale is as follows: The patient is a 26 yrs old Male who presented to the ED at 12/15/2018  2:25 PM with a chief complaint of Abdominal Pain (Lower adbominal pain began yesterday, denies N/VD, reports pain with bowel movements  Hx of diverticulitis)     Given the need for further hospitalization, and along with the documentation of medical necessity present in the chart, the patient is appropriate for inpatient admission  The patient is expected to satisfy the 2 midnight benchmark, and will require further acute medical care   The patient does have comorbid conditions which increases the risk for significant adverse outcome  Given this the patient is appropriate for inpatient admission        The patients vitals on arrival were ED Triage Vitals   Temperature Pulse Respirations Blood Pressure SpO2   12/15/18 1430 12/15/18 1430 12/15/18 1430 12/15/18 1430 12/15/18 1430   98 4 °F (36 9 °C) (!) 110 18 122/84 98 %      Temp Source Heart Rate Source Patient Position - Orthostatic VS BP Location FiO2 (%)   12/15/18 1430 12/15/18 1430 12/15/18 1634 12/15/18 1430 --   Oral Monitor Lying Right arm       Pain Score       12/15/18 1430       Worst Possible Pain           Past Medical History:   Diagnosis Date    Abscess     Asthma     Diverticulitis     MRSA infection     of an abscess     Past Surgical History:   Procedure Laterality Date    FRACTURE SURGERY      MANDIBLE FRACTURE SURGERY             Consults have been placed to:   IP CONSULT TO NUTRITION SERVICES    Vitals:    12/15/18 1634 12/15/18 1802 12/15/18 2300 12/16/18 1445   BP: 109/60 135/86 105/78 138/80   BP Location: Left arm Right arm Left arm Right arm   Pulse: 88 96 79 88   Resp: 18 19 18 18   Temp:  98 6 °F (37 °C) 98 1 °F (36 7 °C) 98 3 °F (36 8 °C)   TempSrc:  Tympanic Temporal Temporal   SpO2: 98% 98% 100% 97%   Weight:       Height:           Most recent labs:    Recent Labs      12/15/18   1456  12/16/18   0539   WBC  13 08*  11 28*   HGB  14 7  14 2   HCT  45 0  43 8   PLT  275  262   K  4 1  3 9   CALCIUM  9 3  8 4   BUN  15  13   CREATININE  1 04  1 10   LIPASE  217   --    AST  25   --    ALT  25   --    ALKPHOS  102   --        Scheduled Meds:  Current Facility-Administered Medications:  acetaminophen 650 mg Oral Q6H PRN Vandana Poe MD   albuterol 2 puff Inhalation Q4H PRN Vandana Poe MD   ciprofloxacin 500 mg Oral Q12H [address] Vandana Poe MD   enoxaparin 40 mg Subcutaneous Daily Vandana Poe MD   ketorolac 15 mg Intravenous Q6H PRN Vandana Poe MD   metroNIDAZOLE 500 mg Oral [facility] Vandana Poe MD   morphine injection 2 mg Intravenous Q4H PRN Shobha Lee MD   nicotine 1 patch Transdermal Daily Shobha Lee MD     Continuous Infusions:   PRN Meds:   acetaminophen    albuterol    ketorolac    morphine injection    Surgical procedures (if appropriate):

## 2018-12-17 NOTE — NURSING NOTE
prescription given to the patient   AVS and education material on tobacco cessation were given   Instructions given regarding the no alcohol and the smoking with the treatment

## 2018-12-17 NOTE — DISCHARGE SUMMARY
Discharge- Brenda Counter 1985, 26 y o  male MRN: [id number]    Unit/Bed#: [address] Encounter: [id number]    Primary Care Provider: No primary care provider on file  Date and time admitted to hospital: 12/15/2018  2:25 PM    Discharge Summary - [facility]    Patient Information: Brenda Palomo 26 y o  male MRN: [id number]  Unit/Bed#: [address] Encounter: [id number]    Discharging Physician / Practitioner: Angelica Rainey PA-C  PCP: No primary care provider on file  Admission Date: 12/15/2018  Discharge Date: 12/17/18    Disposition:     Home    Reason for Admission: diverticulitis    Discharge Diagnoses:     Principal Problem:    Diverticulitis of large intestine without perforation or abscess without bleeding  Active Problems:    Sepsis due to other etiology ([facility] [address] )    Mild intermittent asthma without complication    Current smoker  Resolved Problems:    * No resolved hospital problems  *      Consultations During Hospital Stay:  · none    Procedures Performed:     ·     Significant Findings / Test Results:     CT ABDOMEN AND PELVIS WITH IV CONTRAST     INDICATION:   acute abd pain, hx of diverticulitis      COMPARISON:  CT abdomen and pelvis 12/23/2017     TECHNIQUE:  CT examination of the abdomen and pelvis was performed  Axial, sagittal, and coronal 2D reformatted images were created from the source data and submitted for interpretation      Radiation dose length product (DLP) for this visit:  9312 mGy-cm     This examination, like all CT scans performed in the [facility], was performed utilizing techniques to minimize radiation dose exposure, including the use of iterative   reconstruction and automated exposure control      IV Contrast:  100 mL of iohexol (OMNIPAQUE)  Enteric Contrast:  Enteric contrast was not administered      FINDINGS:     ABDOMEN     LOWER CHEST:  No clinically significant abnormality identified in the visualized lower chest      LIVER/BILIARY TREE:  Unremarkable      GALLBLADDER:  No calcified gallstones  No pericholecystic inflammatory change      SPLEEN:  Unremarkable      PANCREAS:  Unremarkable      ADRENAL GLANDS:  Unremarkable      KIDNEYS/URETERS:  Unremarkable  No hydronephrosis      STOMACH AND BOWEL:  Colonic diverticulosis, most extensive in the descending and sigmoid colon  There is wall thickening and associated inflammatory change at the junction of the descending and sigmoid colon consistent with acute diverticulitis  No   organized fluid collection to suggest abscess      APPENDIX:  A normal appendix was visualized      ABDOMINOPELVIC CAVITY:  No ascites or free intraperitoneal air  No lymphadenopathy      VESSELS:  Unremarkable for patient's age      PELVIS     REPRODUCTIVE ORGANS:  Unremarkable for patient's age      URINARY BLADDER:  Unremarkable      ABDOMINAL WALL/INGUINAL REGIONS:  Unremarkable      OSSEOUS STRUCTURES:  No acute fracture or destructive osseous lesion      IMPRESSION:     Acute diverticulitis at the junction of the descending and sigmoid colon  No organized fluid collection to suggest abscess      The study was marked in EPIC for immediate notification  bcx ntd  Incidental Findings:   ·      Test Results Pending at Discharge (will require follow up):   · bcx     Outpatient Tests Requested:  · none    Complications:  none    Hospital Course:     Jose F Li is a 26 y o  male patient who originally presented to the hospital on 12/15/2018 due to left lower quadrant abdominal pain for 2 days  This came suddenly, described as severe, achy, with no fever/chills/nausea or vomiting  This felt similar to his previous diverticulitis in December 2017  He also had an episode of diverticulitis in July 2017  He had poor appetite appetite since the day prior to admission  Although he reports having regular bowel movements without need for straining, he admits following diet without vegetables    The only vegetable he eats are potatoes  During his last bout of diverticulitis, he was advised to follow up with Gastroenterology and undergo a colonoscopy  However due to lack of insurance this never pushed through  He works a part time job at [employer] 60h/2 weeks and has not qualified for [employer] due to his salary on prior admission in 2017  He has since had a wage increase but still no benefits given part time status  * Diverticulitis of large intestine without perforation or abscess without bleeding   Assessment & Plan    This is his 3rd episode of diverticulitis  The 1st episode happened in July 2017, the 2nd in December 2017  His current episode is fortunately still uncomplicated without abscess or perforation  I told him that after this discharge he needs to discussed treatment options with surgery given this is his 3rd episode  Ambulatory referral has been placed  Started on IV Cipro/Flagyl  As his s/sx have improved we will transition to cipro/flagyl for total of 10 days given underlying sepsis  Discussed dietary strategies at length to minimize diverticular disease once infection has been treated  D/c home     Current smoker   Assessment & Plan    Cessation encouraged  Apply nicotine patch 14 mg daily     Mild intermittent asthma without complication   Assessment & Plan    Without exacerbation  Lungs are clear  May continue albuterol pump as needed     Sepsis due to other etiology [facility])   Assessment & Plan    Patient met sepsis criteria with tachycardia and leukocytosis on admission  Clinically he is improving and tachycardia is resolved  LA wnl  Mild leukocytosis is stable, safe for d/c today  Condition at Discharge: good     Discharge Day Visit / Exam:     Subjective:  Patient seen and examined  Patient reports that he is feeling with better in regard to his abdominal pain as he has none today  Hepatitis appetite is intact a knee is quite hungry  He has no nausea    Did have 2 loose stools earlier today without any blood or melena  He endorses he does not eat a high fiber diet typically and reports he eats a lot of fatty greasy foods and loves white bread  Vitals: Blood Pressure: 99/64 (12/17/18 0719)  Pulse: 79 (12/17/18 0719)  Temperature: 98 1 °F (36 7 °C) (12/17/18 0719)  Temp Source: Temporal (12/17/18 0719)  Respirations: 16 (12/17/18 0719)  Height: 5' 11" (180 3 cm) (12/15/18 1430)  Weight - Scale: 125 kg (275 lb) (12/15/18 1430)  SpO2: 93 % (12/17/18 0719)  Exam:   Physical Exam   Constitutional: No distress  Patient appears stated age, appears obese, appears ill but no apparent distress   HENT:   Head: Normocephalic and atraumatic  Right Ear: External ear normal    Left Ear: External ear normal    Eyes: Conjunctivae are normal  Right eye exhibits no discharge  Left eye exhibits no discharge  No scleral icterus  Neck: Normal range of motion  Cardiovascular: Normal rate, regular rhythm and normal heart sounds  Pulmonary/Chest: Effort normal and breath sounds normal  No respiratory distress  He has no wheezes  He has no rales  Abdominal: Soft  He exhibits no distension  There is no tenderness  There is no rebound and no guarding  Musculoskeletal: He exhibits no edema  Neurological: He is alert  Skin: Skin is warm and dry  He is not diaphoretic  Psychiatric: He has a normal mood and affect  Vitals reviewed  (  Be Sure to Include Physical Exam: Delete this entire line when you have entered your exam)  Discussion with Family:     Discharge instructions/Information to patient and family:   See after visit summary for information provided to patient and family  Provisions for Follow-Up Care:  See after visit summary for information related to follow-up care and any pertinent home health orders  Planned Readmission:  None     Discharge Statement:  I spent 40 minutes discharging the patient  This time was spent on the day of discharge   I had direct contact with the patient on the day of discharge  Greater than 50% of the total time was spent examining patient, answering all patient questions, arranging and discussing plan of care with patient as well as directly providing post-discharge instructions  Additional time then spent on discharge activities  Discharge Medications:  See after visit summary for reconciled discharge medications provided to patient and family        ** Please Note: This note has been constructed using a voice recognition system **

## 2018-12-17 NOTE — DISCHARGE INSTRUCTIONS
Your were found to have diverticulitis which is an infection of the colon  There was no evidence of abscess or perforation of your bowel and your diverticulitis is uncomplicated  You need to take the antibiotics as prescribed  Do not drink alcohol while on these antibiotics or until you have finished them for at least 72 hours to avoid severe nausea and vomiting  You make take Ibuprofen 600mg (3 tablets of normal strength) every 8 hours  You should take this with food for 2 days and then as needed thereafter  Diverticulitis   WHAT YOU NEED TO KNOW:   Diverticulitis is a condition that causes small pockets along your intestine called diverticula to become inflamed or infected  This is caused by hard bowel movements, food, or bacteria that get stuck in the pockets  DISCHARGE INSTRUCTIONS:   Seek care immediately if:   · You have bowel movement or foul-smelling discharge leaking from your vagina or in your urine  · You have severe diarrhea  · You urinate less than usual or not at all  · You are not able to have a bowel movement  · You cannot stop vomiting  · You have severe abdominal pain, a fever, and your abdomen is larger than usual      · You have new or increased blood in your bowel movements  Contact your healthcare provider if:   · You have pain when you urinate  · Your symptoms get worse or do not go away  · You have questions or concerns about your condition or care  Medicines:   · Antibiotics  may be given to help prevent or treat a bacterial infection  · Prescription pain medicine  may be given  Ask your healthcare provider how to take this medicine safely  Some prescription pain medicines contain acetaminophen  Do not take other medicines that contain acetaminophen without talking to your healthcare provider  Too much acetaminophen may cause liver damage  Prescription pain medicine may cause constipation   Ask your healthcare provider how to prevent or treat constipation  · Take your medicine as directed  Contact your healthcare provider if you think your medicine is not helping or if you have side effects  Tell him or her if you are allergic to any medicine  Keep a list of the medicines, vitamins, and herbs you take  Include the amounts, and when and why you take them  Bring the list or the pill bottles to follow-up visits  Carry your medicine list with you in case of an emergency  Clear liquid diet:  A clear liquid diet includes any liquids that you can see through  Examples include water, ginger-laura, cranberry or apple juice, frozen fruit ice, or broth  Stay on a clear liquid diet until your symptoms are gone, or as directed  Follow up with your healthcare provider as directed: You may need to return for a colonoscopy  When your symptoms are gone, you may need a low-fat, high-fiber diet to help prevent diverticulitis from developing again  Your healthcare provider or dietitian can help you create meal plans  Write down your questions so you remember to ask them during your visits  © 2017 [facility] Information is for End User's use only and may not be sold, redistributed or otherwise used for commercial purposes  All illustrations and images included in CareNotes® are the copyrighted property of A D A M , Inc  or Esequiel Cedeño  The above information is an  only  It is not intended as medical advice for individual conditions or treatments  Talk to your doctor, nurse or pharmacist before following any medical regimen to see if it is safe and effective for you

## 2018-12-17 NOTE — SOCIAL WORK
10:53  CM sent email to both [address] [place] [address] and  Insurance Verification Department in re: pt does not have any insurance listed and request was made for an MA application to be completed with them prior to d/c     12:20  JAMES received a call from PA that pt reported to her that he is not eligible for MA, as his last hospital admission, it was applied for and he made too much money  The email had already been sent to [facility] STEPHENS to see pt for the start of an application, but CM will update them  PT had a LACE score of 75, however, he has no PCP  Therefore no TCM/HRR appointment will be scheduled

## 2018-12-17 NOTE — ASSESSMENT & PLAN NOTE
Patient met sepsis criteria with tachycardia and leukocytosis on admission  Clinically he is improving and tachycardia is resolved  LA wnl  Mild leukocytosis is stable, safe for d/c today

## 2018-12-17 NOTE — ASSESSMENT & PLAN NOTE
This is his 3rd episode of diverticulitis  The 1st episode happened in July 2017, the 2nd in December 2017  His current episode is fortunately still uncomplicated without abscess or perforation  I told him that after this discharge he needs to discussed treatment options with surgery given this is his 3rd episode  Started on IV Cipro/Flagyl    As his s/sx have improved we will transition to cipro/flagyl for total of 10 days given underlying sepsis  Discussed dietary strategies at length to minimize diverticular disease once infection has been treated  D/c home

## 2018-12-20 LAB
BACTERIA BLD CULT: NORMAL
BACTERIA BLD CULT: NORMAL

## 2019-05-21 ENCOUNTER — APPOINTMENT (EMERGENCY)
Dept: CT IMAGING | Facility: HOSPITAL | Age: 34
End: 2019-05-21
Payer: COMMERCIAL

## 2019-05-21 ENCOUNTER — HOSPITAL ENCOUNTER (EMERGENCY)
Facility: HOSPITAL | Age: 34
Discharge: HOME | End: 2019-05-21
Attending: EMERGENCY MEDICINE | Admitting: EMERGENCY MEDICINE
Payer: COMMERCIAL

## 2019-05-21 VITALS
DIASTOLIC BLOOD PRESSURE: 65 MMHG | RESPIRATION RATE: 16 BRPM | HEART RATE: 96 BPM | BODY MASS INDEX: 37.51 KG/M2 | SYSTOLIC BLOOD PRESSURE: 121 MMHG | TEMPERATURE: 98.1 F | WEIGHT: 268.96 LBS | OXYGEN SATURATION: 98 %

## 2019-05-21 DIAGNOSIS — K57.32 SIGMOID DIVERTICULITIS: Primary | ICD-10-CM

## 2019-05-21 LAB
ALBUMIN SERPL BCP-MCNC: 3.7 G/DL (ref 3.5–5)
ALP SERPL-CCNC: 101 U/L (ref 46–116)
ALT SERPL W P-5'-P-CCNC: 26 U/L (ref 12–78)
ANION GAP SERPL CALCULATED.3IONS-SCNC: 9 MMOL/L (ref 4–13)
AST SERPL W P-5'-P-CCNC: 41 U/L (ref 5–45)
BACTERIA UR QL AUTO: NORMAL /HPF
BASOPHILS # BLD AUTO: 0.08 THOUSANDS/ΜL (ref 0–0.1)
BASOPHILS NFR BLD AUTO: 1 % (ref 0–1)
BILIRUB SERPL-MCNC: 0.43 MG/DL (ref 0.2–1)
BILIRUB UR QL STRIP: NEGATIVE
BUN SERPL-MCNC: 12 MG/DL (ref 5–25)
CALCIUM SERPL-MCNC: 9.1 MG/DL (ref 8.3–10.1)
CHLORIDE SERPL-SCNC: 103 MMOL/L (ref 100–108)
CLARITY UR: CLEAR
CLARITY, POC: CLEAR
CO2 SERPL-SCNC: 26 MMOL/L (ref 21–32)
COLOR UR: YELLOW
COLOR, POC: YELLOW
CREAT SERPL-MCNC: 1.04 MG/DL (ref 0.6–1.3)
EOSINOPHIL # BLD AUTO: 0.21 THOUSAND/ΜL (ref 0–0.61)
EOSINOPHIL NFR BLD AUTO: 2 % (ref 0–6)
ERYTHROCYTE [DISTWIDTH] IN BLOOD BY AUTOMATED COUNT: 13.2 % (ref 11.6–15.1)
GFR SERPL CREATININE-BSD FRML MDRD: 93 ML/MIN/1.73SQ M
GLUCOSE SERPL-MCNC: 101 MG/DL (ref 65–140)
GLUCOSE UR STRIP-MCNC: NEGATIVE MG/DL
HCT VFR BLD AUTO: 45.9 % (ref 36.5–49.3)
HGB BLD-MCNC: 15.2 G/DL (ref 12–17)
HGB UR QL STRIP.AUTO: ABNORMAL
IMM GRANULOCYTES # BLD AUTO: 0.04 THOUSAND/UL (ref 0–0.2)
IMM GRANULOCYTES NFR BLD AUTO: 0 % (ref 0–2)
KETONES UR STRIP-MCNC: NEGATIVE MG/DL
LACTATE SERPL-SCNC: 1 MMOL/L (ref 0.5–2)
LEUKOCYTE ESTERASE UR QL STRIP: NEGATIVE
LIPASE SERPL-CCNC: 198 U/L (ref 73–393)
LYMPHOCYTES # BLD AUTO: 2.85 THOUSANDS/ΜL (ref 0.6–4.47)
LYMPHOCYTES NFR BLD AUTO: 22 % (ref 14–44)
MCH RBC QN AUTO: 30 PG (ref 26.8–34.3)
MCHC RBC AUTO-ENTMCNC: 33.1 G/DL (ref 31.4–37.4)
MCV RBC AUTO: 91 FL (ref 82–98)
MONOCYTES # BLD AUTO: 0.64 THOUSAND/ΜL (ref 0.17–1.22)
MONOCYTES NFR BLD AUTO: 5 % (ref 4–12)
NEUTROPHILS # BLD AUTO: 9.01 THOUSANDS/ΜL (ref 1.85–7.62)
NEUTS SEG NFR BLD AUTO: 70 % (ref 43–75)
NITRITE UR QL STRIP: NEGATIVE
NON-SQ EPI CELLS URNS QL MICRO: NORMAL /HPF
NRBC BLD AUTO-RTO: 0 /100 WBCS
PH UR STRIP.AUTO: 7 [PH] (ref 4.5–8)
PLATELET # BLD AUTO: 268 THOUSANDS/UL (ref 149–390)
PMV BLD AUTO: 9.5 FL (ref 8.9–12.7)
POTASSIUM SERPL-SCNC: 4.1 MMOL/L (ref 3.5–5.3)
PROT SERPL-MCNC: 8.3 G/DL (ref 6.4–8.2)
PROT UR STRIP-MCNC: NEGATIVE MG/DL
RBC # BLD AUTO: 5.07 MILLION/UL (ref 3.88–5.62)
RBC #/AREA URNS AUTO: NORMAL /HPF
SODIUM SERPL-SCNC: 138 MMOL/L (ref 136–145)
SP GR UR STRIP.AUTO: 1.01 (ref 1–1.03)
UROBILINOGEN UR QL STRIP.AUTO: 0.2 E.U./DL
WBC # BLD AUTO: 12.83 THOUSAND/UL (ref 4.31–10.16)
WBC #/AREA URNS AUTO: NORMAL /HPF

## 2019-05-21 PROCEDURE — 85025 COMPLETE CBC W/AUTO DIFF WBC: CPT | Performed by: EMERGENCY MEDICINE

## 2019-05-21 PROCEDURE — 99284 EMERGENCY DEPT VISIT MOD MDM: CPT

## 2019-05-21 PROCEDURE — 80053 COMPREHEN METABOLIC PANEL: CPT | Performed by: EMERGENCY MEDICINE

## 2019-05-21 PROCEDURE — 99284 EMERGENCY DEPT VISIT MOD MDM: CPT | Performed by: EMERGENCY MEDICINE

## 2019-05-21 PROCEDURE — 96361 HYDRATE IV INFUSION ADD-ON: CPT

## 2019-05-21 PROCEDURE — 83690 ASSAY OF LIPASE: CPT | Performed by: EMERGENCY MEDICINE

## 2019-05-21 PROCEDURE — 36415 COLL VENOUS BLD VENIPUNCTURE: CPT | Performed by: EMERGENCY MEDICINE

## 2019-05-21 PROCEDURE — 74177 CT ABD & PELVIS W/CONTRAST: CPT

## 2019-05-21 PROCEDURE — 81001 URINALYSIS AUTO W/SCOPE: CPT

## 2019-05-21 PROCEDURE — 83605 ASSAY OF LACTIC ACID: CPT | Performed by: EMERGENCY MEDICINE

## 2019-05-21 PROCEDURE — 96374 THER/PROPH/DIAG INJ IV PUSH: CPT

## 2019-05-21 RX ORDER — KETOROLAC TROMETHAMINE 30 MG/ML
15 INJECTION, SOLUTION INTRAMUSCULAR; INTRAVENOUS ONCE
Status: COMPLETED | OUTPATIENT
Start: 2019-05-21 | End: 2019-05-21

## 2019-05-21 RX ORDER — NAPROXEN 500 MG/1
500 TABLET ORAL 2 TIMES DAILY WITH MEALS
Qty: 14 TABLET | Refills: 0 | Status: SHIPPED | OUTPATIENT
Start: 2019-05-21 | End: 2019-10-02

## 2019-05-21 RX ORDER — METRONIDAZOLE 500 MG/1
500 TABLET ORAL ONCE
Status: COMPLETED | OUTPATIENT
Start: 2019-05-21 | End: 2019-05-21

## 2019-05-21 RX ORDER — CIPROFLOXACIN 500 MG/1
500 TABLET, FILM COATED ORAL ONCE
Status: COMPLETED | OUTPATIENT
Start: 2019-05-21 | End: 2019-05-21

## 2019-05-21 RX ORDER — CIPROFLOXACIN 500 MG/1
500 TABLET, FILM COATED ORAL 2 TIMES DAILY
Qty: 14 TABLET | Refills: 0 | Status: SHIPPED | OUTPATIENT
Start: 2019-05-21 | End: 2019-06-04

## 2019-05-21 RX ORDER — METRONIDAZOLE 500 MG/1
500 TABLET ORAL EVERY 8 HOURS SCHEDULED
Qty: 42 TABLET | Refills: 0 | Status: SHIPPED | OUTPATIENT
Start: 2019-05-21 | End: 2019-06-04

## 2019-05-21 RX ADMIN — KETOROLAC TROMETHAMINE 15 MG: 30 INJECTION, SOLUTION INTRAMUSCULAR; INTRAVENOUS at 19:21

## 2019-05-21 RX ADMIN — IOHEXOL 100 ML: 350 INJECTION, SOLUTION INTRAVENOUS at 20:11

## 2019-05-21 RX ADMIN — CIPROFLOXACIN HYDROCHLORIDE 500 MG: 500 TABLET, FILM COATED ORAL at 21:09

## 2019-05-21 RX ADMIN — METRONIDAZOLE 500 MG: 500 TABLET, FILM COATED ORAL at 21:09

## 2019-05-21 RX ADMIN — SODIUM CHLORIDE 1000 ML: 0.9 INJECTION, SOLUTION INTRAVENOUS at 19:20

## 2019-10-02 ENCOUNTER — OFFICE VISIT (OUTPATIENT)
Dept: FAMILY MEDICINE CLINIC | Facility: CLINIC | Age: 34
End: 2019-10-02

## 2019-10-02 VITALS
TEMPERATURE: 98.4 F | RESPIRATION RATE: 16 BRPM | BODY MASS INDEX: 39.27 KG/M2 | DIASTOLIC BLOOD PRESSURE: 82 MMHG | WEIGHT: 274.3 LBS | OXYGEN SATURATION: 97 % | HEIGHT: 70 IN | SYSTOLIC BLOOD PRESSURE: 110 MMHG | HEART RATE: 80 BPM

## 2019-10-02 DIAGNOSIS — Z23 NEED FOR PROPHYLACTIC VACCINATION WITH TETANUS-DIPHTHERIA (TD): ICD-10-CM

## 2019-10-02 DIAGNOSIS — E66.09 CLASS 2 OBESITY DUE TO EXCESS CALORIES WITHOUT SERIOUS COMORBIDITY WITH BODY MASS INDEX (BMI) OF 39.0 TO 39.9 IN ADULT: ICD-10-CM

## 2019-10-02 DIAGNOSIS — Z23 NEEDS FLU SHOT: ICD-10-CM

## 2019-10-02 DIAGNOSIS — Z23 NEED FOR VACCINATION: ICD-10-CM

## 2019-10-02 DIAGNOSIS — Z76.89 ENCOUNTER TO ESTABLISH CARE: Primary | ICD-10-CM

## 2019-10-02 DIAGNOSIS — Z00.00 ANNUAL PHYSICAL EXAM: ICD-10-CM

## 2019-10-02 DIAGNOSIS — J20.9 ACUTE BRONCHITIS: ICD-10-CM

## 2019-10-02 PROCEDURE — 90715 TDAP VACCINE 7 YRS/> IM: CPT | Performed by: FAMILY MEDICINE

## 2019-10-02 PROCEDURE — 99385 PREV VISIT NEW AGE 18-39: CPT | Performed by: FAMILY MEDICINE

## 2019-10-02 PROCEDURE — 90682 RIV4 VACC RECOMBINANT DNA IM: CPT | Performed by: FAMILY MEDICINE

## 2019-10-02 PROCEDURE — 90471 IMMUNIZATION ADMIN: CPT | Performed by: FAMILY MEDICINE

## 2019-10-02 PROCEDURE — 90472 IMMUNIZATION ADMIN EACH ADD: CPT | Performed by: FAMILY MEDICINE

## 2019-10-02 PROCEDURE — 3725F SCREEN DEPRESSION PERFORMED: CPT | Performed by: FAMILY MEDICINE

## 2019-10-02 RX ORDER — FLUTICASONE PROPIONATE 50 MCG
1 SPRAY, SUSPENSION (ML) NASAL DAILY
Qty: 16 G | Refills: 0 | Status: SHIPPED | OUTPATIENT
Start: 2019-10-02 | End: 2020-12-12

## 2019-10-02 RX ORDER — IBUPROFEN 800 MG/1
TABLET ORAL
Refills: 1 | COMMUNITY
Start: 2019-07-12 | End: 2019-10-02

## 2019-10-02 NOTE — PROGRESS NOTES
[address]    NAME: Indira Vega  AGE: 26 y o  SEX: male  : 1985     DATE: 10/2/2019     Assessment and Plan:     Problem List Items Addressed This Visit     None      Visit Diagnoses     Encounter to establish care    -  Primary    Relevant Orders    CBC    Comprehensive metabolic panel    TSH, 3rd generation with Free T4 reflex    Need for vaccination        Acute bronchitis        Relevant Medications    fluticasone (FLONASE) 50 mcg/act nasal spray    Need for prophylactic vaccination with tetanus-diphtheria (Td)        Relevant Orders    TDAP VACCINE GREATER THAN OR EQUAL TO 8YO IM (Completed)    Needs flu shot        Relevant Orders    influenza vaccine, 3378-0428, quadrivalent, recombinant, PF, 0 5 mL, for patients 18 yr+ (FLUBLOK) (Completed)    Class 2 obesity due to excess calories without serious comorbidity with body mass index (BMI) of 39 0 to 39 9 in adult        Relevant Orders    Lipid Panel with Direct LDL reflex    TSH, 3rd generation with Free T4 reflex    Hemoglobin A1C    Annual physical exam              Immunizations and preventive care screenings were discussed with patient today  Appropriate education was printed on patient's after visit summary  Counseling:  Alcohol/drug use: discussed moderation in alcohol intake, the recommendations for healthy alcohol use, and avoidance of illicit drug use  Dental Health: discussed importance of regular tooth brushing, flossing, and dental visits  · Sexual health: discussed sexually transmitted diseases, partner selection, use of condoms, avoidance of unintended pregnancy, and contraceptive alternatives  Return in about 4 weeks (around 10/30/2019), or 4 apt: chronic cond, diverticlosis Ear cerumen removal,  smoking cesations, obesity  [facility] dr Fabian Kirby        Chief Complaint:     Chief Complaint   Patient presents with   [facility]      pt has a medical clearance form that needs to be completed for his custody court case    Asthma     pt needs inhaler Rx, pt states he has been prescribed inhaler in the past       History of Present Illness:     Adult Annual Physical   Patient here for a comprehensive physical exam  The patient reports no problems  Diet and Physical Activity  · Diet/Nutrition: well balanced diet, low calorie diet, low fat diet, high fat diet, low carb diet and adequate fiber intake  · Exercise: no formal exercise  Depression Screening  PHQ-9 Depression Screening    PHQ-9:    Frequency of the following problems over the past two weeks:       Little interest or pleasure in doing things:  0 - not at all  Feeling down, depressed, or hopeless:  1 - several days  PHQ-2 Score:  1       General Health  · Sleep: gets 7-8 hours of sleep on average  · Hearing: normal - bilateral   · Vision: no vision problems  · Dental: no dental visits for >1 year   Health  · History of STDs?: no      Review of Systems:     Review of Systems   Constitutional: Negative  HENT: Negative  Respiratory: Negative  Cardiovascular: Negative  Gastrointestinal: Negative  Endocrine: Negative  Genitourinary: Negative  Musculoskeletal: Negative  Neurological: Negative  Hematological: Negative  Psychiatric/Behavioral: Negative         Past Medical History:     Past Medical History:   Diagnosis Date    Abscess     Allergic     Asthma     Diverticulitis     Diverticulitis of colon     MRSA infection     of an abscess    Obesity     SIRS (systemic inflammatory response syndrome) ([facility] [address] )       Past Surgical History:     Past Surgical History:   Procedure Laterality Date    FRACTURE SURGERY      INCISION AND DRAINAGE OF WOUND Left 12/23/2016    Groin    MANDIBLE FRACTURE SURGERY        Social History:     Social History     Socioeconomic History    Marital status: Single     Spouse name: None    Number of children: 1    Years of education: None    Highest education level: None   Occupational History    None   Social Needs    Financial resource strain: None    Food insecurity:     Worry: None     Inability: None    Transportation needs:     Medical: None     Non-medical: None   Tobacco Use    Smoking status: Current Every Day Smoker     Packs/day: 0 50     Years: 15 00     Pack years: 7 50     Types: Cigarettes    Smokeless tobacco: Never Used   Substance and Sexual Activity    Alcohol use: Not Currently     Frequency: Never    Drug use: Yes     Frequency: 7 0 times per week     Types: Marijuana    Sexual activity: Not Currently     Partners: Female   Lifestyle    Physical activity:     Days per week: None     Minutes per session: None    Stress: None   Relationships    Social connections:     Talks on phone: None     Gets together: None     Attends Cheondo[employer] service: None     Active member of club or organization: None     Attends meetings of clubs or organizations: None     Relationship status: None    Intimate partner violence:     Fear of current or ex partner: None     Emotionally abused: None     Physically abused: None     Forced sexual activity: None   Other Topics Concern    None   Social History Narrative    Lives with domestic partner    Sedentary lifestyle        patient not sexually active for past year with his wife       Family History:     Family History   Problem Relation Age of Onset    Diabetes Maternal Aunt     Asthma Mother     Depression Mother     No Known Problems Sister     No Known Problems Brother     No Known Problems Sister     No Known Problems Sister     No Known Problems Brother     No Known Problems Brother     No Known Problems Brother     No Known Problems Brother     No Known Problems Brother       Current Medications:     Current Outpatient Medications   Medication Sig Dispense Refill    fluticasone (FLONASE) 50 mcg/act nasal spray 1 spray into each nostril daily 16 g 0     No current facility-administered medications for this visit  Allergies:     No Known Allergies   Physical Exam:     /82 (BP Location: Right arm, Patient Position: Sitting, Cuff Size: Large)   Pulse 80   Temp 98 4 °F (36 9 °C) (Tympanic)   Resp 16   Ht 5' 9 5" (1 765 m)   Wt 124 kg (274 lb 4 8 oz)   SpO2 97%   BMI 39 93 kg/m²     Physical Exam   Constitutional: He is oriented to person, place, and time  He appears well-developed and well-nourished  No distress  HENT:   Head: Normocephalic and atraumatic  Mouth/Throat: No oropharyngeal exudate  Eyes: Conjunctivae and EOM are normal  Right eye exhibits no discharge  Left eye exhibits no discharge  Neck: Neck supple  No JVD present  No thyromegaly present  Cardiovascular: Normal rate, regular rhythm and normal heart sounds  Exam reveals no gallop and no friction rub  Pulmonary/Chest: Effort normal and breath sounds normal  No stridor  No respiratory distress  He has no wheezes  He has no rales  He exhibits no tenderness  Abdominal: Soft  Bowel sounds are normal  He exhibits no distension  There is no tenderness  There is no rebound and no guarding  Musculoskeletal: Normal range of motion  He exhibits no edema, tenderness or deformity  Lymphadenopathy:     He has no cervical adenopathy  Neurological: He is alert and oriented to person, place, and time  No cranial nerve deficit  Skin: Skin is warm  No rash noted  He is not diaphoretic  No erythema  Psychiatric: He has a normal mood and affect   His behavior is normal  Thought content normal        Ney Aguilar MD   [facility]

## 2019-10-02 NOTE — PATIENT INSTRUCTIONS
Wellness Visit for Adults   AMBULATORY CARE:   A wellness visit  is when you see your healthcare provider to get screened for health problems  You can also get advice on how to stay healthy  Write down your questions so you remember to ask them  Ask your healthcare provider how often you should have a wellness visit  What happens at a wellness visit:  Your healthcare provider will ask about your health, and your family history of health problems  This includes high blood pressure, heart disease, and cancer  He or she will ask if you have symptoms that concern you, if you smoke, and about your mood  You may also be asked about your intake of medicines, supplements, food, and alcohol  Any of the following may be done:  · Your weight  will be checked  Your height may also be checked so your body mass index (BMI) can be calculated  Your BMI shows if you are at a healthy weight  · Your blood pressure  and heart rate will be checked  Your temperature may also be checked  · Blood and urine tests  may be done  Blood tests may be done to check your cholesterol levels  Abnormal cholesterol levels increase your risk for heart disease and stroke  You may also need a blood or urine test to check for diabetes if you are at increased risk  Urine tests may be done to look for signs of an infection or kidney disease  · A physical exam  includes checking your heartbeat and lungs with a stethoscope  Your healthcare provider may also check your skin to look for sun damage  · Screening tests  may be recommended  A screening test is done to check for diseases that may not cause symptoms  The screening tests you may need depend on your age, gender, family history, and lifestyle habits  For example, colorectal screening may be recommended if you are 48years old or older  Screening tests you need if you are a woman:   · A Pap smear  is used to screen for cervical cancer   Pap smears are usually done every 3 to 5 years depending on your age  You may need them more often if you have had abnormal Pap smear test results in the past  Ask your healthcare provider how often you should have a Pap smear  · A mammogram  is an x-ray of your breasts to screen for breast cancer  Experts recommend mammograms every 2 years starting at age 48 years  You may need a mammogram at age 52 years or younger if you have an increased risk for breast cancer  Talk to your healthcare provider about when you should start having mammograms and how often you need them  Vaccines you may need:   · Get an influenza vaccine  every year  The influenza vaccine protects you from the flu  Several types of viruses cause the flu  The viruses change over time, so new vaccines are made each year  · Get a tetanus-diphtheria (Td) booster vaccine  every 10 years  This vaccine protects you against tetanus and diphtheria  Tetanus is a severe infection that may cause painful muscle spasms and lockjaw  Diphtheria is a severe bacterial infection that causes a thick covering in the back of your mouth and throat  · Get a human papillomavirus (HPV) vaccine  if you are female and aged 23 to 32 or male 23 to 24 and never received it  This vaccine protects you from HPV infection  HPV is the most common infection spread by sexual contact  HPV may also cause vaginal, penile, and anal cancers  · Get a pneumococcal vaccine  if you are aged 72 years or older  The pneumococcal vaccine is an injection given to protect you from pneumococcal disease  Pneumococcal disease is an infection caused by pneumococcal bacteria  The infection may cause pneumonia, meningitis, or an ear infection  · Get a shingles vaccine  if you are aged 61 or older, even if you have had shingles before  The shingles vaccine is an injection to protect you from the varicella-zoster virus  This is the same virus that causes chickenpox   Shingles is a painful rash that develops in people who had chickenpox or have been exposed to the virus  How to eat healthy:  My Plate is a model for planning healthy meals  It shows the types and amounts of foods that should go on your plate  Fruits and vegetables make up about half of your plate, and grains and protein make up the other half  A serving of dairy is included on the side of your plate  The amount of calories and serving sizes you need depends on your age, gender, weight, and height  Examples of healthy foods are listed below:  · Eat a variety of vegetables  such as dark green, red, and orange vegetables  You can also include canned vegetables low in sodium (salt) and frozen vegetables without added butter or sauces  · Eat a variety of fresh fruits , canned fruit in 100% juice, frozen fruit, and dried fruit  · Include whole grains  At least half of the grains you eat should be whole grains  Examples include whole-wheat bread, wheat pasta, brown rice, and whole-grain cereals such as oatmeal     · Eat a variety of protein foods such as seafood (fish and shellfish), lean meat, and poultry without skin (turkey and chicken)  Examples of lean meats include pork leg, shoulder, or tenderloin, and beef round, sirloin, tenderloin, and extra lean ground beef  Other protein foods include eggs and egg substitutes, beans, peas, soy products, nuts, and seeds  · Choose low-fat dairy products such as skim or 1% milk or low-fat yogurt, cheese, and cottage cheese  · Limit unhealthy fats  such as butter, hard margarine, and shortening  Exercise:  Exercise at least 30 minutes per day on most days of the week  Some examples of exercise include walking, biking, dancing, and swimming  You can also fit in more physical activity by taking the stairs instead of the elevator or parking farther away from stores  Include muscle strengthening activities 2 days each week  Regular exercise provides many health benefits   It helps you manage your weight, and decreases your risk for type 2 diabetes, heart disease, stroke, and high blood pressure  Exercise can also help improve your mood  Ask your healthcare provider about the best exercise plan for you  General health and safety guidelines:   · Do not smoke  Nicotine and other chemicals in cigarettes and cigars can cause lung damage  Ask your healthcare provider for information if you currently smoke and need help to quit  E-cigarettes or smokeless tobacco still contain nicotine  Talk to your healthcare provider before you use these products  · Limit alcohol  A drink of alcohol is 12 ounces of beer, 5 ounces of wine, or 1½ ounces of liquor  · Lose weight, if needed  Being overweight increases your risk of certain health conditions  These include heart disease, high blood pressure, type 2 diabetes, and certain types of cancer  · Protect your skin  Do not sunbathe or use tanning beds  Use sunscreen with a SPF 15 or higher  Apply sunscreen at least 15 minutes before you go outside  Reapply sunscreen every 2 hours  Wear protective clothing, hats, and sunglasses when you are outside  · Drive safely  Always wear your seatbelt  Make sure everyone in your car wears a seatbelt  A seatbelt can save your life if you are in an accident  Do not use your cell phone when you are driving  This could distract you and cause an accident  Pull over if you need to make a call or send a text message  · Practice safe sex  Use latex condoms if are sexually active and have more than one partner  Your healthcare provider may recommend screening tests for sexually transmitted infections (STIs)  · Wear helmets, lifejackets, and protective gear  Always wear a helmet when you ride a bike or motorcycle, go skiing, or play sports that could cause a head injury  Wear protective equipment when you play sports  Wear a lifejacket when you are on a boat or doing water sports    © 2017 [employer] Information is for End User's use only and may not be sold, redistributed or otherwise used for commercial purposes  All illustrations and images included in CareNotes® are the copyrighted property of [employer]  or Esequiel Cedeño  The above information is an  only  It is not intended as medical advice for individual conditions or treatments  Talk to your doctor, nurse or pharmacist before following any medical regimen to see if it is safe and effective for you  Weight Management   AMBULATORY CARE:   Why it is important to manage your weight:  Being overweight increases your risk of health conditions such as heart disease, high blood pressure, type 2 diabetes, and certain types of cancer  It can also increase your risk for osteoarthritis, sleep apnea, and other respiratory problems  Aim for a slow, steady weight loss  Even a small amount of weight loss can lower your risk of health problems  How to lose weight safely:  A safe and healthy way to lose weight is to eat fewer calories and get regular exercise  You can lose up about 1 pound a week by decreasing the number of calories you eat by 500 calories each day  You can decrease calories by eating smaller portion sizes or by cutting out high-calorie foods  Read labels to find out how many calories are in the foods you eat  You can also burn calories with exercise such as walking, swimming, or biking  You will be more likely to keep weight off if you make these changes part of your lifestyle  Healthy meal plan for weight management:  A healthy meal plan includes a variety of foods, contains fewer calories, and helps you stay healthy  A healthy meal plan includes the following:  · Eat whole-grain foods more often  A healthy meal plan should contain fiber  Fiber is the part of grains, fruits, and vegetables that is not broken down by your body  Whole-grain foods are healthy and provide extra fiber in your diet   Some examples of whole-grain foods are whole-wheat breads and pastas, oatmeal, brown rice, and bulgur  · Eat a variety of vegetables every day  Include dark, leafy greens such as spinach, kale, kelsey greens, and mustard greens  Eat yellow and orange vegetables such as carrots, sweet potatoes, and winter squash  · Eat a variety of fruits every day  Choose fresh or canned fruit (canned in its own juice or light syrup) instead of juice  Fruit juice has very little or no fiber  · Eat low-fat dairy foods  Drink fat-free (skim) milk or 1% milk  Eat fat-free yogurt and low-fat cottage cheese  Try low-fat cheeses such as mozzarella and other reduced-fat cheeses  · Choose meat and other protein foods that are low in fat  Choose beans or other legumes such as split peas or lentils  Choose fish, skinless poultry (chicken or turkey), or lean cuts of red meat (beef or pork)  Before you cook meat or poultry, cut off any visible fat  · Use less fat and oil  Try baking foods instead of frying them  Add less fat, such as margarine, sour cream, regular salad dressing and mayonnaise to foods  Eat fewer high-fat foods  Some examples of high-fat foods include french fries, doughnuts, ice cream, and cakes  · Eat fewer sweets  Limit foods and drinks that are high in sugar  This includes candy, cookies, regular soda, and sweetened drinks  Ways to decrease calories:   · Eat smaller portions  ¨ Use a small plate with smaller servings  ¨ Do not eat second helpings  ¨ When you eat at a restaurant, ask for a box and place half of your meal in the box before you eat  ¨ Share an entrée with someone else  · Replace high-calorie snacks with healthy, low-calorie snacks  ¨ Choose fresh fruit, vegetables, fat-free rice cakes, or air-popped popcorn instead of potato chips, nuts, or chocolate  ¨ Choose water or calorie-free drinks instead of soda or sweetened drinks  · Eat regular meals  Skipping meals can lead to overeating later in the day   Eat a healthy snack in place of a meal if you do not have time to eat a regular meal      · Do not shop for groceries when you are hungry  You may be more likely to make unhealthy food choices  Take a grocery list of healthy foods and shop after you have eaten  Exercise:  Exercise at least 30 minutes per day on most days of the week  Some examples of exercise include walking, biking, dancing, and swimming  You can also fit in more physical activity by taking the stairs instead of the elevator or parking farther away from stores  Ask your healthcare provider about the best exercise plan for you  Other things to consider as you try to lose weight:   · Be aware of situations that may give you the urge to overeat, such as eating while watching television  Find ways to avoid these situations  For example, read a book, go for a walk, or do crafts  · Meet with a weight loss support group or friends who are also trying to lose weight  This may help you stay motivated to continue working on your weight loss goals  © 2017 [employer]  Information is for End User's use only and may not be sold, redistributed or otherwise used for commercial purposes  All illustrations and images included in CareNotes® are the copyrighted property of [employer] M [employer] Inc  or [employer]  The above information is an  only  It is not intended as medical advice for individual conditions or treatments  Talk to your doctor, nurse or pharmacist before following any medical regimen to see if it is safe and effective for you  Obesity   AMBULATORY CARE:   Obesity  is when your body mass index (BMI) is greater than 30  Your healthcare provider will use your height and weight to measure your BMI  The risks of obesity include  many health problems, such as injuries or physical disability   You may need tests to check for the following:  · Diabetes     · High blood pressure or high cholesterol     · Heart disease · Gallbladder or liver disease     · Cancer of the colon, breast, prostate, liver, or kidney     · Sleep apnea     · Arthritis or gout  Seek care immediately if:   · You have a severe headache, confusion, or difficulty speaking  · You have weakness on one side of your body  · You have chest pain, sweating, or shortness of breath  Contact your healthcare provider if:   · You have symptoms of gallbladder or liver disease, such as pain in your upper abdomen  · You have knee or hip pain and discomfort while walking  · You have symptoms of diabetes, such as intense hunger and thirst, and frequent urination  · You have symptoms of sleep apnea, such as snoring or daytime sleepiness  · You have questions or concerns about your condition or care  Treatment for obesity  focuses on helping you lose weight to improve your health  Even a small decrease in BMI can reduce the risk for many health problems  Your healthcare provider will help you set a weight-loss goal   · Lifestyle changes  are the first step in treating obesity  These include making healthy food choices and getting regular physical activity  Your healthcare provider may suggest a weight-loss program that involves coaching, education, and therapy  · Medicine  may help you lose weight when it is used with a healthy diet and physical activity  · Surgery  can help you lose weight if you are very obese and have other health problems  There are several types of weight-loss surgery  Ask your healthcare provider for more information  Be successful losing weight:   · Set small, realistic goals  An example of a small goal is to walk for 20 minutes 5 days a week  Robin goal is to lose 5% of your body weight  · Tell friends, family members, and coworkers about your goals  and ask for their support  Ask a friend to lose weight with you, or join a weight-loss support group      · Identify foods or triggers that may cause you to overeat , and find ways to avoid them  Remove tempting high-calorie foods from your home and workplace  Place a bowl of fresh fruit on your kitchen counter  If stress causes you to eat, then find other ways to cope with stress  · Keep a diary to track what you eat and drink  Also write down how many minutes of physical activity you do each day  Weigh yourself once a week and record it in your diary  Eating changes: You will need to eat 500 to 1,000 fewer calories each day than you currently eat to lose 1 to 2 pounds a week  The following changes will help you cut calories:  · Eat smaller portions  Use small plates, no larger than 9 inches in diameter  Fill your plate half full of fruits and vegetables  Measure your food using measuring cups until you know what a serving size looks like  · Eat 3 meals and 1 or 2 snacks each day  Plan your meals in advance  Lou Miller and eat at home most of the time  Eat slowly  · Eat fruits and vegetables at every meal   They are low in calories and high in fiber, which makes you feel full  Do not add butter, margarine, or cream sauce to vegetables  Use herbs to season steamed vegetables  · Eat less fat and fewer fried foods  Eat more baked or grilled chicken and fish  These protein sources are lower in calories and fat than red meat  Limit fast food  Dress your salads with olive oil and vinegar instead of bottled dressing  · Limit the amount of sugar you eat  Do not drink sugary beverages  Limit alcohol  Activity changes:  Physical activity is good for your body in many ways  It helps you burn calories and build strong muscles  It decreases stress and depression, and improves your mood  It can also help you sleep better  Talk to your healthcare provider before you begin an exercise program   · Exercise for at least 30 minutes 5 days a week  Start slowly  Set aside time each day for physical activity that you enjoy and that is convenient for you   It is best to do both weight training and an activity that increases your heart rate, such as walking, bicycling, or swimming  · Find ways to be more active  Do yard work and housecleaning  Walk up the stairs instead of using elevators  Spend your leisure time going to events that require walking, such as outdoor festivals or fairs  This extra physical activity can help you lose weight and keep it off  Follow up with your healthcare provider as directed: You may need to meet with a dietitian  Write down your questions so you remember to ask them during your visits  © 2017 [employer]  Information is for End User's use only and may not be sold, redistributed or otherwise used for commercial purposes  All illustrations and images included in CareNotes® are the copyrighted property of [employer]  or E[employer]iel Cedeño  The above information is an  only  It is not intended as medical advice for individual conditions or treatments  Talk to your doctor, nurse or pharmacist before following any medical regimen to see if it is safe and effective for you  Cigarette Smoking and Your Health   AMBULATORY CARE:   Risks to your health if you smoke:  Nicotine and other chemicals found in tobacco damage every cell in your body  Even if you are a light smoker, you have an increased risk for cancer, heart disease, and lung disease  If you are pregnant or have diabetes, smoking increases your risk for complications  Benefits to your health if you stop smoking:   · You decrease respiratory symptoms such as coughing, wheezing, and shortness of breath  · You reduce your risk for cancers of the lung, mouth, throat, kidney, bladder, pancreas, stomach, and cervix  If you already have cancer, you increase the benefits of chemotherapy  You also reduce your risk for cancer returning or a second cancer from developing  · You reduce your risk for heart disease, blood clots, heart attack, and stroke  · You reduce your risk for lung infections, and diseases such as pneumonia, asthma, chronic bronchitis, and emphysema  · Your circulation improves  More oxygen can be delivered to your body  If you have diabetes, you lower your risk for complications, such as kidney, artery, and eye diseases  You also lower your risk for nerve damage  Nerve damage can lead to amputations, poor vision, and blindness  · You improve your body's ability to heal and to fight infections  Benefits to the health of others if you stop smoking:  Tobacco is harmful to nonsmokers who breathe in your secondhand smoke  The following are ways the health of others around you may improve when you stop smoking:  · You lower the risks for lung cancer and heart disease in nonsmoking adults  · If you are pregnant, you lower the risk for miscarriage, early delivery, low birth weight, and stillbirth  You also lower your baby's risk for SIDS, obesity, developmental delay, and neurobehavioral problems, such as ADHD  · If you have children, you lower their risk for ear infections, colds, pneumonia, bronchitis, and asthma  For more information and support to stop smoking:   · [employer]  Phone: 5- 840 - 012-1004  Web Address: www [employer]  Follow up with your healthcare provider as directed:  Write down your questions so you remember to ask them during your visits  © 2017 [address] Information is for End User's use only and may not be sold, redistributed or otherwise used for commercial purposes  All illustrations and images included in CareNotes® are the copyrighted property of [employer] M [employer]  or E[employer]iel Cedeño  The above information is an  only  It is not intended as medical advice for individual conditions or treatments  Talk to your doctor, nurse or pharmacist before following any medical regimen to see if it is safe and effective for you

## 2019-10-03 ENCOUNTER — APPOINTMENT (OUTPATIENT)
Dept: LAB | Facility: HOSPITAL | Age: 34
End: 2019-10-03
Payer: COMMERCIAL

## 2019-10-03 DIAGNOSIS — E66.09 CLASS 2 OBESITY DUE TO EXCESS CALORIES WITHOUT SERIOUS COMORBIDITY WITH BODY MASS INDEX (BMI) OF 39.0 TO 39.9 IN ADULT: ICD-10-CM

## 2019-10-03 DIAGNOSIS — Z76.89 ENCOUNTER TO ESTABLISH CARE: ICD-10-CM

## 2019-10-03 LAB
ALBUMIN SERPL BCP-MCNC: 4.5 G/DL (ref 3–5.2)
ALP SERPL-CCNC: 87 U/L (ref 43–122)
ALT SERPL W P-5'-P-CCNC: 27 U/L (ref 9–52)
ANION GAP SERPL CALCULATED.3IONS-SCNC: 9 MMOL/L (ref 5–14)
AST SERPL W P-5'-P-CCNC: 37 U/L (ref 17–59)
BILIRUB SERPL-MCNC: 0.4 MG/DL
BUN SERPL-MCNC: 13 MG/DL (ref 5–25)
CALCIUM SERPL-MCNC: 9.6 MG/DL (ref 8.4–10.2)
CHLORIDE SERPL-SCNC: 103 MMOL/L (ref 97–108)
CHOLEST SERPL-MCNC: 195 MG/DL
CO2 SERPL-SCNC: 30 MMOL/L (ref 22–30)
CREAT SERPL-MCNC: 1.1 MG/DL (ref 0.7–1.5)
ERYTHROCYTE [DISTWIDTH] IN BLOOD BY AUTOMATED COUNT: 13.9 %
EST. AVERAGE GLUCOSE BLD GHB EST-MCNC: 114 MG/DL
GFR SERPL CREATININE-BSD FRML MDRD: 87 ML/MIN/1.73SQ M
GLUCOSE P FAST SERPL-MCNC: 89 MG/DL (ref 70–99)
HBA1C MFR BLD: 5.6 % (ref 4.2–6.3)
HCT VFR BLD AUTO: 44.5 % (ref 41–53)
HDLC SERPL-MCNC: 31 MG/DL (ref 40–59)
HGB BLD-MCNC: 15 G/DL (ref 13.5–17.5)
LDLC SERPL CALC-MCNC: 106 MG/DL
MCH RBC QN AUTO: 29.7 PG (ref 26–34)
MCHC RBC AUTO-ENTMCNC: 33.8 G/DL (ref 31–36)
MCV RBC AUTO: 88 FL (ref 80–100)
PLATELET # BLD AUTO: 337 THOUSANDS/UL (ref 150–450)
PMV BLD AUTO: 8.4 FL (ref 8.9–12.7)
POTASSIUM SERPL-SCNC: 4.4 MMOL/L (ref 3.6–5)
PROT SERPL-MCNC: 8.6 G/DL (ref 5.9–8.4)
RBC # BLD AUTO: 5.05 MILLION/UL (ref 4.5–5.9)
SODIUM SERPL-SCNC: 142 MMOL/L (ref 137–147)
TRIGL SERPL-MCNC: 290 MG/DL
TSH SERPL DL<=0.05 MIU/L-ACNC: 1.36 UIU/ML (ref 0.47–4.68)
WBC # BLD AUTO: 8.2 THOUSAND/UL (ref 4.5–11)

## 2019-10-03 PROCEDURE — 80061 LIPID PANEL: CPT

## 2019-10-03 PROCEDURE — 84443 ASSAY THYROID STIM HORMONE: CPT

## 2019-10-03 PROCEDURE — 83036 HEMOGLOBIN GLYCOSYLATED A1C: CPT

## 2019-10-03 PROCEDURE — 36415 COLL VENOUS BLD VENIPUNCTURE: CPT

## 2019-10-03 PROCEDURE — 85027 COMPLETE CBC AUTOMATED: CPT

## 2019-10-03 PROCEDURE — 80053 COMPREHEN METABOLIC PANEL: CPT

## 2020-02-26 ENCOUNTER — OFFICE VISIT (OUTPATIENT)
Dept: URGENT CARE | Age: 35
End: 2020-02-26
Payer: COMMERCIAL

## 2020-02-26 VITALS
WEIGHT: 275 LBS | DIASTOLIC BLOOD PRESSURE: 79 MMHG | BODY MASS INDEX: 38.5 KG/M2 | HEART RATE: 91 BPM | HEIGHT: 71 IN | SYSTOLIC BLOOD PRESSURE: 124 MMHG | RESPIRATION RATE: 18 BRPM | OXYGEN SATURATION: 98 % | TEMPERATURE: 97.1 F

## 2020-02-26 DIAGNOSIS — J32.9 BACTERIAL SINUSITIS: ICD-10-CM

## 2020-02-26 DIAGNOSIS — B96.89 BACTERIAL SINUSITIS: ICD-10-CM

## 2020-02-26 DIAGNOSIS — R19.7 DIARRHEA, UNSPECIFIED TYPE: ICD-10-CM

## 2020-02-26 DIAGNOSIS — R10.84 GENERALIZED ABDOMINAL PAIN: Primary | ICD-10-CM

## 2020-02-26 PROCEDURE — 99283 EMERGENCY DEPT VISIT LOW MDM: CPT | Performed by: NURSE PRACTITIONER

## 2020-02-26 PROCEDURE — G0382 LEV 3 HOSP TYPE B ED VISIT: HCPCS | Performed by: NURSE PRACTITIONER

## 2020-02-26 PROCEDURE — 99203 OFFICE O/P NEW LOW 30 MIN: CPT | Performed by: NURSE PRACTITIONER

## 2020-02-26 RX ORDER — AMOXICILLIN AND CLAVULANATE POTASSIUM 875; 125 MG/1; MG/1
1 TABLET, FILM COATED ORAL EVERY 12 HOURS SCHEDULED
Qty: 14 TABLET | Refills: 0 | Status: SHIPPED | OUTPATIENT
Start: 2020-02-26 | End: 2020-03-04

## 2020-02-26 NOTE — PATIENT INSTRUCTIONS
NSAIDs, take them with food  · Do not eat foods that cause irritation  Foods such as oranges and salsa can cause burning or pain  Eat a variety of healthy foods  Examples include fruits (not citrus), vegetables, low-fat dairy products, beans, whole-grain breads, and lean meats and fish  Try to eat small meals, and drink water with your meals  Do not eat for at least 3 hours before you go to bed  · Find ways to relax and decrease stress  Stress can increase stomach acid and make gastritis worse  Activities such as yoga, meditation, or listening to music can help you relax  Spend time with friends, or do things you enjoy  Follow up with your healthcare provider as directed: You may need ongoing tests or treatment, or referral to a gastroenterologist  Write down your questions so you remember to ask them during your visits  © 2017 [address] Information is for End User's use only and may not be sold, redistributed or otherwise used for commercial purposes  All illustrations and images included in CareNotes® are the copyrighted property of [employer] , [employer]  or E[employer]iel Cedeño  The above information is an  only  It is not intended as medical advice for individual conditions or treatments  Talk to your doctor, nurse or pharmacist before following any medical regimen to see if it is safe and effective for you

## 2020-02-26 NOTE — LETTER
February 26, 2020     Patient: Bella Castillo   YOB: 1985   Date of Visit: 2/26/2020       To Whom It May Concern: It is my medical opinion that Bella Castillo may return to work on 02/27/2020 if symptom free, if not please excuse till 02/28/2020           Sincerely,        KELVIN Walker    CC: No Recipients

## 2020-02-26 NOTE — PROGRESS NOTES
NAME: Sabina Moura is a 37 y o  male  : 1985    MRN: [id number]    /79 (BP Location: Left arm, Patient Position: Sitting, Cuff Size: Adult)   Pulse 91   Temp (!) 97 1 °F (36 2 °C) (Tympanic)   Resp 18   Ht 5' 11" (1 803 m)   Wt 125 kg (275 lb)   SpO2 98%   BMI 38 35 kg/m²     Assessment and Plan   Generalized abdominal pain [R10 84]  1  Generalized abdominal pain     2  Diarrhea, unspecified type     3  Bacterial sinusitis  amoxicillin-clavulanate (AUGMENTIN) 875-125 mg per tablet       Cesar was seen today for nausea  Diagnoses and all orders for this visit:    Generalized abdominal pain    Diarrhea, unspecified type    Bacterial sinusitis  -     amoxicillin-clavulanate (AUGMENTIN) 875-125 mg per tablet; Take 1 tablet by mouth every 12 (twelve) hours for 7 days        Patient Instructions   Patient Instructions     Take meds as directed  Use zyrtec or allegra daily   Use flonase as directed  Follow up with GI    Take prilosc OTC  Stay aware from trigger foods, discussed with pt      Gastritis   WHAT YOU NEED TO KNOW:   Gastritis is inflammation or irritation of the lining of your stomach  DISCHARGE INSTRUCTIONS:   Call 911 for any of the following:   · You develop chest pain or shortness of breath  Return to the emergency department if:   · You vomit blood  · You have black or bloody bowel movements  · You have severe stomach or back pain  Contact your healthcare provider if:   · You have a fever  · You have new or worsening symptoms, even after treatment  · You have questions or concerns about your condition or care  Medicines:   · Medicines  may be given to help treat a bacterial infection or decrease stomach acid  · Take your medicine as directed  Contact your healthcare provider if you think your medicine is not helping or if you have side effects  Tell him or her if you are allergic to any medicine  Keep a list of the medicines, vitamins, and herbs you take  Include the amounts, and when and why you take them  Bring the list or the pill bottles to follow-up visits  Carry your medicine list with you in case of an emergency  Manage or prevent gastritis:   · Do not smoke  Nicotine and other chemicals in cigarettes and cigars can make your symptoms worse and cause lung damage  Ask your healthcare provider for information if you currently smoke and need help to quit  E-cigarettes or smokeless tobacco still contain nicotine  Talk to your healthcare provider before you use these products  · Do not drink alcohol  Alcohol can prevent healing and make your gastritis worse  Talk to your healthcare provider if you need help to stop drinking  · Do not take NSAIDs or aspirin unless directed  These and similar medicines can cause irritation  If your healthcare provider says it is okay to take NSAIDs, take them with food  · Do not eat foods that cause irritation  Foods such as oranges and salsa can cause burning or pain  Eat a variety of healthy foods  Examples include fruits (not citrus), vegetables, low-fat dairy products, beans, whole-grain breads, and lean meats and fish  Try to eat small meals, and drink water with your meals  Do not eat for at least 3 hours before you go to bed  · Find ways to relax and decrease stress  Stress can increase stomach acid and make gastritis worse  Activities such as yoga, meditation, or listening to music can help you relax  Spend time with friends, or do things you enjoy  Follow up with your healthcare provider as directed: You may need ongoing tests or treatment, or referral to a gastroenterologist  Write down your questions so you remember to ask them during your visits  © 2017 [employer]  Information is for End User's use only and may not be sold, redistributed or otherwise used for commercial purposes   All illustrations and images included in CareNotes® are the copyrighted property of [employer]  or [employer]  The above information is an  only  It is not intended as medical advice for individual conditions or treatments  Talk to your doctor, nurse or pharmacist before following any medical regimen to see if it is safe and effective for you  Proceed to ER if symptoms worsen  Chief Complaint     Chief Complaint   Patient presents with    Nausea     since yesterday, pt c/o nausea  pt states it worsens after eating spicy or citrus foods, and aftering having a cigarette  pt tired taking tums  History of Present Illness     17-year-old male here today with his daughter and 2 sons at bedside  Comes in today with complaints of having heartburn increased on nausea due to heartburn and increased postnasal drip  Denies having any headaches at this time or chest pain but that the heartburn has been present for the past few days  Patient is overweight and has a history of having heartburn and seen a GI doctor however he has not done so  He is taking Tums a few  times since this has occurred in the past few days and no relief  He has not had any diarrhea or constipation and denies having any blood in stool or vomit at this time  He coughs so hard that he has an increase of mucus in this vomit and smears of blood  Denies cardiac symptoms, no SOB, or CP  Epigastric pain with no radiation    Nausea   This is a chronic problem  Associated symptoms include abdominal pain, congestion, nausea and a sore throat  Pertinent negatives include no chest pain or vomiting  Review of Systems   Review of Systems   Constitutional: Negative  Negative for activity change  HENT: Positive for congestion, postnasal drip, sinus pressure, sinus pain and sore throat  Negative for ear pain, mouth sores and nosebleeds  Eyes: Negative  Respiratory: Positive for chest tightness (intermittent)  Cardiovascular: Negative for chest pain     Gastrointestinal: Positive for abdominal pain, diarrhea and nausea  Negative for abdominal distention, anal bleeding, blood in stool, constipation, rectal pain and vomiting  Endocrine: Negative  Genitourinary: Negative  Musculoskeletal: Negative  Neurological: Negative  Current Medications       Current Outpatient Medications:     amoxicillin-clavulanate (AUGMENTIN) 875-125 mg per tablet, Take 1 tablet by mouth every 12 (twelve) hours for 7 days, Disp: 14 tablet, Rfl: 0    fluticasone (FLONASE) 50 mcg/act nasal spray, 1 spray into each nostril daily (Patient not taking: Reported on 2/26/2020), Disp: 16 g, Rfl: 0    Current Allergies     Allergies as of 02/26/2020    (No Known Allergies)              Past Medical History:   Diagnosis Date    Abscess     Allergic     Asthma     Diverticulitis     Diverticulitis of colon     MRSA infection     of an abscess    Obesity     SIRS (systemic inflammatory response syndrome) (HCC)        Past Surgical History:   Procedure Laterality Date    FRACTURE SURGERY      INCISION AND DRAINAGE OF WOUND Left 12/23/2016    Groin    MANDIBLE FRACTURE SURGERY         Family History   Problem Relation Age of Onset    Diabetes Maternal Aunt     Asthma Mother     Depression Mother     No Known Problems Sister     No Known Problems Brother     No Known Problems Sister     No Known Problems Sister     No Known Problems Brother     No Known Problems Brother     No Known Problems Brother     No Known Problems Brother     No Known Problems Brother          Medications have been verified      The following portions of the patient's history were reviewed and updated as appropriate: allergies, current medications, past family history, past medical history, past social history, past surgical history and problem list     Objective   /79 (BP Location: Left arm, Patient Position: Sitting, Cuff Size: Adult)   Pulse 91   Temp (!) 97 1 °F (36 2 °C) (Tympanic)   Resp 18   Ht 5' 11" (1 803 m)   [employer] 125 kg (275 lb)   SpO2 98%   BMI 38 35 kg/m²      Physical Exam     Physical Exam   Constitutional: He appears well-developed and well-nourished  He is cooperative  No distress  obese   HENT:   Head: Normocephalic  Right Ear: Hearing and tympanic membrane normal    Left Ear: Hearing and tympanic membrane normal    Nose: Mucosal edema present  Right sinus exhibits no maxillary sinus tenderness  Left sinus exhibits maxillary sinus tenderness  Mouth/Throat: Uvula is midline and mucous membranes are normal  Posterior oropharyngeal edema and posterior oropharyngeal erythema present  Tonsils are 0 on the right  Tonsils are 0 on the left  No tonsillar exudate  Cardiovascular: Normal rate, regular rhythm, normal heart sounds and normal pulses  Pulmonary/Chest: Effort normal and breath sounds normal  No stridor  He has no decreased breath sounds  He has no wheezes  He has no rhonchi  Abdominal: Normal appearance and bowel sounds are normal  He exhibits no ascites and no mass  There is no tenderness  There is no rigidity, no rebound, no guarding and no CVA tenderness  No hernia  Hx of having diverticulitis, however symptoms of discomfort are epigastric and worse when laying down, no mass felt,    Neurological: He is alert         Jace Cockayne, CRNP

## 2020-03-06 ENCOUNTER — OFFICE VISIT (OUTPATIENT)
Dept: URGENT CARE | Age: 35
End: 2020-03-06
Payer: COMMERCIAL

## 2020-03-06 VITALS
HEIGHT: 71 IN | RESPIRATION RATE: 19 BRPM | WEIGHT: 278 LBS | BODY MASS INDEX: 38.92 KG/M2 | SYSTOLIC BLOOD PRESSURE: 145 MMHG | HEART RATE: 105 BPM | DIASTOLIC BLOOD PRESSURE: 90 MMHG | OXYGEN SATURATION: 97 % | TEMPERATURE: 98.1 F

## 2020-03-06 DIAGNOSIS — J06.9 UPPER RESPIRATORY TRACT INFECTION, UNSPECIFIED TYPE: ICD-10-CM

## 2020-03-06 DIAGNOSIS — R09.82 POST-NASAL DRIP: ICD-10-CM

## 2020-03-06 DIAGNOSIS — J02.9 SORE THROAT: ICD-10-CM

## 2020-03-06 DIAGNOSIS — J02.0 STREP THROAT: Primary | ICD-10-CM

## 2020-03-06 LAB — S PYO AG THROAT QL: NEGATIVE

## 2020-03-06 PROCEDURE — G0382 LEV 3 HOSP TYPE B ED VISIT: HCPCS | Performed by: PHYSICIAN ASSISTANT

## 2020-03-06 PROCEDURE — 87070 CULTURE OTHR SPECIMN AEROBIC: CPT | Performed by: PHYSICIAN ASSISTANT

## 2020-03-06 PROCEDURE — 99283 EMERGENCY DEPT VISIT LOW MDM: CPT | Performed by: PHYSICIAN ASSISTANT

## 2020-03-06 PROCEDURE — 87880 STREP A ASSAY W/OPTIC: CPT | Performed by: PHYSICIAN ASSISTANT

## 2020-03-06 PROCEDURE — 99213 OFFICE O/P EST LOW 20 MIN: CPT | Performed by: PHYSICIAN ASSISTANT

## 2020-03-06 RX ORDER — AMOXICILLIN 500 MG/1
500 CAPSULE ORAL EVERY 12 HOURS SCHEDULED
Qty: 20 CAPSULE | Refills: 0 | Status: SHIPPED | OUTPATIENT
Start: 2020-03-06 | End: 2020-03-16

## 2020-03-06 RX ORDER — METHYLPREDNISOLONE 4 MG/1
TABLET ORAL
Qty: 21 TABLET | Refills: 0 | Status: SHIPPED | OUTPATIENT
Start: 2020-03-06 | End: 2020-12-12

## 2020-03-06 RX ORDER — ALBUTEROL SULFATE 90 UG/1
2 AEROSOL, METERED RESPIRATORY (INHALATION) EVERY 6 HOURS PRN
Qty: 18 G | Refills: 0 | Status: SHIPPED | OUTPATIENT
Start: 2020-03-06 | End: 2020-09-04 | Stop reason: SDUPTHER

## 2020-03-06 RX ORDER — BENZONATATE 200 MG/1
200 CAPSULE ORAL 3 TIMES DAILY PRN
Qty: 20 CAPSULE | Refills: 0 | Status: SHIPPED | OUTPATIENT
Start: 2020-03-06 | End: 2020-09-04 | Stop reason: SDUPTHER

## 2020-03-06 NOTE — LETTER
March 6, 2020     Patient: Imagene Brittle   YOB: 1985   Date of Visit: 3/6/2020       To Whom it May Concern:    Imagene Brittle was seen in my clinic on 3/6/2020  He may return to work on 3/8/2020  If you have any questions or concerns, please don't hesitate to call           Sincerely,          Erick Dhaliwal PA-C        CC: No Recipients

## 2020-03-07 NOTE — PATIENT INSTRUCTIONS
Follow up with PCP in 3-5 days  Proceed to  ER if symptoms worsen  Patient placed on amoxicillin, steroids as needed  Continue with symptomatic treatment  Patient will begin flonase ever tessalon as needed for nasal congestion  Patient will begin Claritin as directed  Tylenol as needed for fever of chills   Warm salt gargles as needed for sore throat     Strep Throat   WHAT YOU NEED TO KNOW:   Strep throat is a throat infection caused by bacteria  It is easily spread from person to person  DISCHARGE INSTRUCTIONS:   Call 911 for any of the following:   · You have trouble breathing  Return to the emergency department if:   · You have new symptoms like a bad headache, stiff neck, chest pain, or vomiting  · You are drooling because you cannot swallow your spit  Contact your healthcare provider if:   · You have a fever  · You have a rash or ear pain  · You have green, yellow-brown, or bloody mucus when you cough or blow your nose  · You are unable to drink anything  · You have questions or concerns about your condition or care  Medicines:   · Antibiotics  help treat your strep throat  You should feel better within 2 to 3 days after you start antibiotics  · Take your medicine as directed  Contact your healthcare provider if you think your medicine is not helping or if you have side effects  Tell him or her if you are allergic to any medicine  Keep a list of the medicines, vitamins, and herbs you take  Include the amounts, and when and why you take them  Bring the list or the pill bottles to follow-up visits  Carry your medicine list with you in case of an emergency  Manage your symptoms:   · Use lozenges, ice, soft foods, or popsicles  to soothe your throat  · Drink juice, milk shakes, or soup  if your throat is too sore to eat solid food  Drinking liquids can also help prevent dehydration  · Gargle with salt water  Mix ¼ teaspoon salt in a glass of warm water and gargle   This may help reduce swelling in your throat  · Do not smoke  Nicotine and other chemicals in cigarettes and cigars can cause lung damage and make your symptoms worse  Ask your healthcare provider for information if you currently smoke and need help to quit  E-cigarettes or smokeless tobacco still contain nicotine  Talk to your healthcare provider before you use these products  Return to work or school  24 hours after you start antibiotic medicine  Prevent the spread of strep throat:   · Wash your hands often  Use soap and water  Wash your hands after you use the bathroom, change a child's diapers, or sneeze  Wash your hands before you prepare or eat food  · Do not share food or drinks  Replace your toothbrush after you have taken antibiotics for 24 hours  Follow up with your healthcare provider as directed:  Write down your questions so you remember to ask them during your visits  © 2017 [employer] Information is for End User's use only and may not be sold, redistributed or otherwise used for commercial purposes  All illustrations and images included in CareNotes® are the copyrighted property of [employer]  or [employer]  The above information is an  only  It is not intended as medical advice for individual conditions or treatments  Talk to your doctor, nurse or pharmacist before following any medical regimen to see if it is safe and effective for you

## 2020-03-07 NOTE — PROGRESS NOTES
[facility]      NAME: Karol Akers is a 37 y o  male  : 1985    MRN: [id number]  DATE: 2020  TIME: 7:45 PM    Assessment and Plan   Strep throat [J02 0]  1  Strep throat  amoxicillin (AMOXIL) 500 mg capsule   2  Sore throat  POCT rapid strepA   3  Post-nasal drip  methylPREDNISolone 4 MG tablet therapy pack    benzonatate (TESSALON) 200 MG capsule   4  Upper respiratory tract infection, unspecified type  methylPREDNISolone 4 MG tablet therapy pack    benzonatate (TESSALON) 200 MG capsule         Patient Instructions     Follow up with PCP in 3-5 days  Proceed to  ER if symptoms worsen  Patient placed on amoxicillin, steroids as needed  Continue with symptomatic treatment  Patient will begin flonase ever tessalon as needed for nasal congestion  Patient will begin Claritin as directed  Tylenol as needed for fever of chills   Warm salt gargles as needed for sore throa    Chief Complaint     Chief Complaint   Patient presents with    Cough     since yesterday, pt c/o dry cough  states chest discomfort with cough  denies fevers  pt did get flu shot this year  hx of asthma  History of Present Illness       Patient is a 17-year-old male presenting the office for cough  Patient states that symptoms have been ongoing for past 24 hours in duration  Patient states that his cough began as a mild dry a productive cough and since progressed to a cough with shortness of breath  Patient denies any fevers any chills  Patient denies any problems his eyes ears nose or throat  Patient denies any shortness of breath or chest tightness  Patient denies any nausea vomiting diarrhea  Patient states he has been using albuterol of minimal relief of symptoms  Patient is up-to-date on his flu vaccine  Patient offers no other complaints at this time  Review of Systems   Review of Systems   Constitutional: Negative  HENT: Negative  Eyes: Negative      Respiratory: Positive for cough  Negative for apnea, choking, chest tightness, shortness of breath, wheezing and stridor  Cardiovascular: Negative  Gastrointestinal: Negative  Endocrine: Negative  Genitourinary: Negative  Musculoskeletal: Negative  Skin: Negative  Allergic/Immunologic: Negative  Neurological: Negative  Hematological: Negative  Negative for adenopathy  Psychiatric/Behavioral: Negative            Current Medications       Current Outpatient Medications:     amoxicillin (AMOXIL) 500 mg capsule, Take 1 capsule (500 mg total) by mouth every 12 (twelve) hours for 10 days, Disp: 20 capsule, Rfl: 0    benzonatate (TESSALON) 200 MG capsule, Take 1 capsule (200 mg total) by mouth 3 (three) times a day as needed for cough, Disp: 20 capsule, Rfl: 0    fluticasone (FLONASE) 50 mcg/act nasal spray, 1 spray into each nostril daily (Patient not taking: Reported on 2/26/2020), Disp: 16 g, Rfl: 0    methylPREDNISolone 4 MG tablet therapy pack, Use as directed on package, Disp: 21 tablet, Rfl: 0    Current Allergies     Allergies as of 03/06/2020    (No Known Allergies)            The following portions of the patient's history were reviewed and updated as appropriate: allergies, current medications, past family history, past medical history, past social history, past surgical history and problem list      Past Medical History:   Diagnosis Date    Abscess     Allergic     Asthma     Diverticulitis     Diverticulitis of colon     MRSA infection     of an abscess    Obesity     SIRS (systemic inflammatory response syndrome) ([facility] [address] )        Past Surgical History:   Procedure Laterality Date    FRACTURE SURGERY      INCISION AND DRAINAGE OF WOUND Left 12/23/2016    Groin    MANDIBLE FRACTURE SURGERY         Family History   Problem Relation Age of Onset    Diabetes Maternal Aunt     Asthma Mother     Depression Mother     No Known Problems Sister     No Known Problems Brother     No Known Problems Sister     No Known Problems Sister     No Known Problems Brother     No Known Problems Brother     No Known Problems Brother     No Known Problems Brother     No Known Problems Brother          Medications have been verified  Objective   /90 (BP Location: Left arm, Patient Position: Sitting, Cuff Size: Adult)   Pulse 105   Temp 98 1 °F (36 7 °C) (Tympanic)   Resp 19   Ht 5' 11" (1 803 m)   Wt 126 kg (278 lb)   SpO2 97%   BMI 38 77 kg/m²        Physical Exam     Physical Exam   Constitutional: He is oriented to person, place, and time  He appears well-developed and well-nourished  HENT:   Head: Normocephalic  Right Ear: Hearing, tympanic membrane, external ear and ear canal normal    Left Ear: Hearing, tympanic membrane, external ear and ear canal normal    Mouth/Throat: Mucous membranes are normal  Posterior oropharyngeal erythema present  No oropharyngeal exudate or posterior oropharyngeal edema  Tonsils are 3+ on the right  Tonsils are 3+ on the left  Tonsillar exudate  Eyes: Pupils are equal, round, and reactive to light  Conjunctivae and EOM are normal    Neck: Normal range of motion  Cardiovascular: Normal rate, regular rhythm, normal heart sounds and intact distal pulses  Pulmonary/Chest: Effort normal and breath sounds normal  No stridor  No respiratory distress  He has no wheezes  He has no rales  He exhibits no tenderness  Neurological: He is alert and oriented to person, place, and time  Skin: Skin is warm  Capillary refill takes less than 2 seconds  Psychiatric: He has a normal mood and affect  His behavior is normal  Judgment and thought content normal    Nursing note and vitals reviewed

## 2020-03-08 ENCOUNTER — TELEPHONE (OUTPATIENT)
Dept: URGENT CARE | Age: 35
End: 2020-03-08

## 2020-03-08 LAB — BACTERIA THROAT CULT: NORMAL

## 2020-04-07 ENCOUNTER — TELEMEDICINE (OUTPATIENT)
Dept: FAMILY MEDICINE CLINIC | Facility: CLINIC | Age: 35
End: 2020-04-07

## 2020-04-07 DIAGNOSIS — J45.20 MILD INTERMITTENT ASTHMA WITHOUT COMPLICATION: ICD-10-CM

## 2020-04-07 DIAGNOSIS — K21.9 GASTROESOPHAGEAL REFLUX DISEASE, ESOPHAGITIS PRESENCE NOT SPECIFIED: Primary | ICD-10-CM

## 2020-04-07 DIAGNOSIS — K57.32 DIVERTICULITIS OF LARGE INTESTINE WITHOUT PERFORATION OR ABSCESS WITHOUT BLEEDING: ICD-10-CM

## 2020-04-07 PROCEDURE — T1015 CLINIC SERVICE: HCPCS | Performed by: FAMILY MEDICINE

## 2020-04-07 PROCEDURE — G2012 BRIEF CHECK IN BY MD/QHP: HCPCS | Performed by: FAMILY MEDICINE

## 2020-04-07 RX ORDER — FAMOTIDINE 20 MG/1
20 TABLET, FILM COATED ORAL 2 TIMES DAILY
Qty: 60 TABLET | Refills: 2 | Status: SHIPPED | OUTPATIENT
Start: 2020-04-07 | End: 2020-12-12

## 2020-04-07 RX ORDER — METRONIDAZOLE 500 MG/1
500 TABLET ORAL EVERY 8 HOURS SCHEDULED
Qty: 30 TABLET | Refills: 0 | Status: SHIPPED | OUTPATIENT
Start: 2020-04-07 | End: 2020-04-15 | Stop reason: SDUPTHER

## 2020-04-07 RX ORDER — CIPROFLOXACIN 500 MG/1
500 TABLET, FILM COATED ORAL EVERY 12 HOURS SCHEDULED
Qty: 20 TABLET | Refills: 0 | Status: SHIPPED | OUTPATIENT
Start: 2020-04-07 | End: 2020-04-15 | Stop reason: SDUPTHER

## 2020-04-08 ENCOUNTER — TELEPHONE (OUTPATIENT)
Dept: FAMILY MEDICINE CLINIC | Facility: CLINIC | Age: 35
End: 2020-04-08

## 2020-04-09 PROBLEM — K21.9 GASTROESOPHAGEAL REFLUX DISEASE: Status: ACTIVE | Noted: 2020-04-09

## 2020-04-14 ENCOUNTER — TELEPHONE (OUTPATIENT)
Dept: FAMILY MEDICINE CLINIC | Facility: CLINIC | Age: 35
End: 2020-04-14

## 2020-04-14 ENCOUNTER — TELEMEDICINE (OUTPATIENT)
Dept: FAMILY MEDICINE CLINIC | Facility: CLINIC | Age: 35
End: 2020-04-14

## 2020-04-14 DIAGNOSIS — K57.92 DIVERTICULITIS: Primary | ICD-10-CM

## 2020-04-14 DIAGNOSIS — M54.50 ACUTE LEFT-SIDED LOW BACK PAIN, UNSPECIFIED WHETHER SCIATICA PRESENT: ICD-10-CM

## 2020-04-14 PROBLEM — R82.998 DARK BROWN-COLORED URINE: Status: ACTIVE | Noted: 2020-04-14

## 2020-04-14 PROCEDURE — G2012 BRIEF CHECK IN BY MD/QHP: HCPCS | Performed by: FAMILY MEDICINE

## 2020-04-14 PROCEDURE — T1015 CLINIC SERVICE: HCPCS | Performed by: FAMILY MEDICINE

## 2020-04-15 ENCOUNTER — TELEPHONE (OUTPATIENT)
Dept: FAMILY MEDICINE CLINIC | Facility: CLINIC | Age: 35
End: 2020-04-15

## 2020-04-15 ENCOUNTER — OFFICE VISIT (OUTPATIENT)
Dept: FAMILY MEDICINE CLINIC | Facility: CLINIC | Age: 35
End: 2020-04-15

## 2020-04-15 VITALS
WEIGHT: 279.2 LBS | SYSTOLIC BLOOD PRESSURE: 130 MMHG | BODY MASS INDEX: 39.09 KG/M2 | HEART RATE: 96 BPM | RESPIRATION RATE: 18 BRPM | DIASTOLIC BLOOD PRESSURE: 86 MMHG | OXYGEN SATURATION: 98 % | TEMPERATURE: 96.5 F | HEIGHT: 71 IN

## 2020-04-15 DIAGNOSIS — R10.9 FLANK PAIN: ICD-10-CM

## 2020-04-15 DIAGNOSIS — K57.32 DIVERTICULITIS OF LARGE INTESTINE WITHOUT PERFORATION OR ABSCESS WITHOUT BLEEDING: ICD-10-CM

## 2020-04-15 DIAGNOSIS — R10.84 GENERALIZED ABDOMINAL PAIN: Primary | ICD-10-CM

## 2020-04-15 LAB
SL AMB  POCT GLUCOSE, UA: NORMAL
SL AMB LEUKOCYTE ESTERASE,UA: 5
SL AMB POCT BILIRUBIN,UA: NORMAL
SL AMB POCT BLOOD,UA: NORMAL
SL AMB POCT CLARITY,UA: NORMAL
SL AMB POCT COLOR,UA: NORMAL
SL AMB POCT KETONES,UA: NORMAL
SL AMB POCT NITRITE,UA: NORMAL
SL AMB POCT PH,UA: 5
SL AMB POCT SPECIFIC GRAVITY,UA: 1.03
SL AMB POCT URINE PROTEIN: NORMAL
SL AMB POCT UROBILINOGEN: NORMAL

## 2020-04-15 PROCEDURE — 81002 URINALYSIS NONAUTO W/O SCOPE: CPT | Performed by: NURSE PRACTITIONER

## 2020-04-15 PROCEDURE — 4004F PT TOBACCO SCREEN RCVD TLK: CPT | Performed by: NURSE PRACTITIONER

## 2020-04-15 PROCEDURE — 3008F BODY MASS INDEX DOCD: CPT | Performed by: NURSE PRACTITIONER

## 2020-04-15 PROCEDURE — T1015 CLINIC SERVICE: HCPCS | Performed by: NURSE PRACTITIONER

## 2020-04-15 PROCEDURE — 99214 OFFICE O/P EST MOD 30 MIN: CPT | Performed by: NURSE PRACTITIONER

## 2020-04-15 RX ORDER — CIPROFLOXACIN 500 MG/1
500 TABLET, FILM COATED ORAL EVERY 12 HOURS SCHEDULED
Qty: 20 TABLET | Refills: 0 | Status: SHIPPED | OUTPATIENT
Start: 2020-04-15 | End: 2020-04-25

## 2020-04-15 RX ORDER — METRONIDAZOLE 500 MG/1
500 TABLET ORAL EVERY 8 HOURS SCHEDULED
Qty: 30 TABLET | Refills: 0 | Status: SHIPPED | OUTPATIENT
Start: 2020-04-15 | End: 2020-04-25

## 2020-04-21 ENCOUNTER — TELEMEDICINE (OUTPATIENT)
Dept: GASTROENTEROLOGY | Facility: MEDICAL CENTER | Age: 35
End: 2020-04-21
Payer: COMMERCIAL

## 2020-04-21 DIAGNOSIS — K21.9 GASTROESOPHAGEAL REFLUX DISEASE, ESOPHAGITIS PRESENCE NOT SPECIFIED: ICD-10-CM

## 2020-04-21 DIAGNOSIS — K57.32 DIVERTICULITIS OF LARGE INTESTINE WITHOUT PERFORATION OR ABSCESS WITHOUT BLEEDING: Primary | ICD-10-CM

## 2020-04-21 PROCEDURE — 99243 OFF/OP CNSLTJ NEW/EST LOW 30: CPT | Performed by: INTERNAL MEDICINE

## 2020-04-21 RX ORDER — SODIUM, POTASSIUM,MAG SULFATES 17.5-3.13G
180 SOLUTION, RECONSTITUTED, ORAL ORAL ONCE
Qty: 180 ML | Refills: 0 | Status: SHIPPED | OUTPATIENT
Start: 2020-04-21 | End: 2020-08-13 | Stop reason: SDUPTHER

## 2020-04-24 ENCOUNTER — TELEPHONE (OUTPATIENT)
Dept: GASTROENTEROLOGY | Facility: MEDICAL CENTER | Age: 35
End: 2020-04-24

## 2020-05-01 ENCOUNTER — HOSPITAL ENCOUNTER (OUTPATIENT)
Dept: CT IMAGING | Facility: HOSPITAL | Age: 35
Discharge: HOME | End: 2020-05-01
Payer: COMMERCIAL

## 2020-05-01 DIAGNOSIS — R10.84 GENERALIZED ABDOMINAL PAIN: ICD-10-CM

## 2020-05-01 DIAGNOSIS — K57.32 DIVERTICULITIS OF LARGE INTESTINE WITHOUT PERFORATION OR ABSCESS WITHOUT BLEEDING: ICD-10-CM

## 2020-05-01 DIAGNOSIS — R10.9 FLANK PAIN: ICD-10-CM

## 2020-05-01 PROCEDURE — 74176 CT ABD & PELVIS W/O CONTRAST: CPT

## 2020-05-05 ENCOUNTER — TELEPHONE (OUTPATIENT)
Dept: FAMILY MEDICINE CLINIC | Facility: CLINIC | Age: 35
End: 2020-05-05

## 2020-05-06 ENCOUNTER — TELEMEDICINE (OUTPATIENT)
Dept: FAMILY MEDICINE CLINIC | Facility: CLINIC | Age: 35
End: 2020-05-06

## 2020-05-06 VITALS — HEART RATE: 88 BPM | HEIGHT: 71 IN | BODY MASS INDEX: 39.06 KG/M2 | WEIGHT: 279 LBS

## 2020-05-06 DIAGNOSIS — K80.20 CALCULUS OF GALLBLADDER WITHOUT CHOLECYSTITIS WITHOUT OBSTRUCTION: ICD-10-CM

## 2020-05-06 DIAGNOSIS — R10.84 GENERALIZED ABDOMINAL PAIN: Primary | ICD-10-CM

## 2020-05-06 PROCEDURE — 99213 OFFICE O/P EST LOW 20 MIN: CPT | Performed by: NURSE PRACTITIONER

## 2020-05-06 PROCEDURE — T1015 CLINIC SERVICE: HCPCS | Performed by: NURSE PRACTITIONER

## 2020-05-06 RX ORDER — POLYETHYLENE GLYCOL 3350 17 G/17G
17 POWDER, FOR SOLUTION ORAL DAILY
Qty: 289 G | Refills: 1 | Status: SHIPPED | OUTPATIENT
Start: 2020-05-06 | End: 2020-12-12

## 2020-05-06 RX ORDER — DICYCLOMINE HYDROCHLORIDE 10 MG/1
10 CAPSULE ORAL
Qty: 120 CAPSULE | Refills: 0 | Status: SHIPPED | OUTPATIENT
Start: 2020-05-06 | End: 2020-12-12

## 2020-06-09 ENCOUNTER — OFFICE VISIT (OUTPATIENT)
Dept: SURGERY | Facility: CLINIC | Age: 35
End: 2020-06-09
Payer: COMMERCIAL

## 2020-06-09 VITALS
SYSTOLIC BLOOD PRESSURE: 134 MMHG | HEART RATE: 92 BPM | WEIGHT: 283 LBS | HEIGHT: 71 IN | TEMPERATURE: 97.2 F | BODY MASS INDEX: 39.62 KG/M2 | DIASTOLIC BLOOD PRESSURE: 88 MMHG

## 2020-06-09 DIAGNOSIS — K80.20 CALCULUS OF GALLBLADDER WITHOUT CHOLECYSTITIS WITHOUT OBSTRUCTION: Primary | ICD-10-CM

## 2020-06-09 PROCEDURE — 3008F BODY MASS INDEX DOCD: CPT | Performed by: SURGERY

## 2020-06-09 PROCEDURE — 99243 OFF/OP CNSLTJ NEW/EST LOW 30: CPT | Performed by: SURGERY

## 2020-06-09 PROCEDURE — 3008F BODY MASS INDEX DOCD: CPT | Performed by: NURSE PRACTITIONER

## 2020-08-12 ENCOUNTER — OFFICE VISIT (OUTPATIENT)
Dept: URGENT CARE | Age: 35
End: 2020-08-12
Payer: COMMERCIAL

## 2020-08-12 VITALS
WEIGHT: 275 LBS | HEART RATE: 99 BPM | RESPIRATION RATE: 18 BRPM | DIASTOLIC BLOOD PRESSURE: 81 MMHG | OXYGEN SATURATION: 96 % | HEIGHT: 71 IN | SYSTOLIC BLOOD PRESSURE: 122 MMHG | BODY MASS INDEX: 38.5 KG/M2 | TEMPERATURE: 97.3 F

## 2020-08-12 DIAGNOSIS — R10.13 EPIGASTRIC PAIN: Primary | ICD-10-CM

## 2020-08-12 LAB
ATRIAL RATE: 84 BPM
P AXIS: 51 DEGREES
PR INTERVAL: 152 MS
QRS AXIS: 63 DEGREES
QRSD INTERVAL: 84 MS
QT INTERVAL: 360 MS
QTC INTERVAL: 425 MS
T WAVE AXIS: 15 DEGREES
VENTRICULAR RATE: 84 BPM

## 2020-08-12 PROCEDURE — 93005 ELECTROCARDIOGRAM TRACING: CPT | Performed by: PHYSICIAN ASSISTANT

## 2020-08-12 PROCEDURE — G0382 LEV 3 HOSP TYPE B ED VISIT: HCPCS | Performed by: PHYSICIAN ASSISTANT

## 2020-08-12 PROCEDURE — 93010 ELECTROCARDIOGRAM REPORT: CPT | Performed by: INTERNAL MEDICINE

## 2020-08-12 PROCEDURE — 99213 OFFICE O/P EST LOW 20 MIN: CPT | Performed by: PHYSICIAN ASSISTANT

## 2020-08-12 PROCEDURE — 99283 EMERGENCY DEPT VISIT LOW MDM: CPT | Performed by: PHYSICIAN ASSISTANT

## 2020-08-12 RX ORDER — FAMOTIDINE 20 MG/1
20 TABLET, FILM COATED ORAL 2 TIMES DAILY
Qty: 30 TABLET | Refills: 0 | Status: SHIPPED | OUTPATIENT
Start: 2020-08-12 | End: 2021-03-05

## 2020-08-12 NOTE — PROGRESS NOTES
[facility]    NAME: Ed Adorno is a 37 y o  male  : 1985    MRN: [id number]  DATE: 2020  TIME: 2:28 PM    Assessment and Plan   Epigastric pain [R10 13]  1  Epigastric pain  ECG 12 lead     EKG:  Normal sinus rhythm  No Q-waves  Good R-wave progression  No acute ST T wave changes  Patient Instructions   Patient Instructions   Restart your Pepcid twice a day  Avoid tobacco, caffeine, marijuana products  Call your primary care provider as soon as possible to arrange follow-up evaluation for the next 7-10 days  If significant worsening of your symptoms proceed to emergency room for further evaluation  Chief Complaint     Chief Complaint   Patient presents with    Abdominal Pain     pt reports episodes of sharp epigastric pain for the last 2 weeks  Episodes last about a half hour  Pain is relieved by lying on stomach and burping  Pt supposed to be taking Pepcid 2 x daily but has only been taking it "when I need it"  History of Present Illness   Ed Adorno presents to the clinic c/o  37year old male comes in with 2 episodes of terrible sharp epigastric lower chest pain  Nonradiating  1st episode about 2 weeks ago that lasted 30 minutes  He said it was so sharp and occurred before he ate  He was on his way to the ER when it stopped suddenly  He did not go to the ER  He reports that he called his family doctor who recommended he go to an urgent care  He did not seek further attention at that time  Yesterday afternoon he had similar episode that happened again  Pain was epigastric lower anterior chest wall and he thought he was having a heart attack  It was hard to breathe  Lasted about 30 minutes  Tried vomit but was unsuccessful  Ended up laying on his abdomen and that caused relief  Pain went away  He is not having any pain at this time  He denies any dysphagia or dyspepsia  He denies any melena or hematochezia    He does have history of GERD  Has not been taking his H2 blocker  Also has history of diverticulitis  No left lower quadrant pain at this time  History of gallbladder disease  Was supposed to have his gallbladder taken out in the past but did not do so  Review of Systems   Review of Systems   Constitutional: Positive for appetite change  Negative for activity change, chills, diaphoresis, fatigue and fever  Respiratory: Negative  Cardiovascular: Positive for chest pain  Negative for palpitations and leg swelling  Possible chest pain in epigastric region   Gastrointestinal: Positive for abdominal pain  Negative for abdominal distention, anal bleeding, blood in stool, constipation, diarrhea and nausea  Skin: Negative for rash  Hematological: Negative for adenopathy         Current Medications     Long-Term Medications   Medication Sig Dispense Refill    dicyclomine (BENTYL) 10 mg capsule Take 1 capsule (10 mg total) by mouth 4 (four) times a day (before meals and at bedtime) (Patient not taking: Reported on 8/12/2020) 120 capsule 0    famotidine (PEPCID) 20 mg tablet Take 1 tablet (20 mg total) by mouth 2 (two) times a day (Patient not taking: Reported on 8/12/2020) 60 tablet 2    fluticasone (FLONASE) 50 mcg/act nasal spray 1 spray into each nostril daily (Patient not taking: Reported on 6/9/2020) 16 g 0    polyethylene glycol (GLYCOLAX) 17 GM/SCOOP powder Take 17 g by mouth daily (Patient not taking: Reported on 8/12/2020) 289 g 1    SUPREP BOWEL PREP KIT 17 5-3 13-1 6 GM/177ML SOLN Take 180 mL by mouth once for 1 dose 180 mL 0       Current Allergies     Allergies as of 08/12/2020    (No Known Allergies)          The following portions of the patient's history were reviewed and updated as appropriate: allergies, current medications, past family history, past medical history, past social history, past surgical history and problem list   Past Medical History:   Diagnosis Date    Abscess     Allergic     Asthma     Diverticulitis     Diverticulitis of colon     Gastroesophageal reflux disease 4/9/2020    MRSA infection     of an abscess    Obesity     SIRS (systemic inflammatory response syndrome) ([address] )      Past Surgical History:   Procedure Laterality Date    FRACTURE SURGERY      INCISION AND DRAINAGE OF WOUND Left 12/23/2016    Groin    MANDIBLE FRACTURE SURGERY       Family History   Problem Relation Age of Onset    Diabetes Maternal Aunt     Asthma Mother     Depression Mother     No Known Problems Sister     No Known Problems Brother     No Known Problems Sister     No Known Problems Sister     No Known Problems Brother     No Known Problems Brother     No Known Problems Brother     No Known Problems Brother     No Known Problems Brother        Objective   /81   Pulse 99   Temp (!) 97 3 °F (36 3 °C)   Resp 18   Ht 5' 11" (1 803 m)   Wt 125 kg (275 lb)   SpO2 96%   BMI 38 35 kg/m²   No LMP for male patient  Physical Exam     Physical Exam  Vitals signs and nursing note reviewed  Constitutional:       General: He is not in acute distress  Appearance: Normal appearance  He is well-developed  He is not ill-appearing, toxic-appearing or diaphoretic  Comments: Significant other accompanies   HENT:      Head: Normocephalic and atraumatic  Eyes:      General: No scleral icterus  Extraocular Movements: Extraocular movements intact  Pupils: Pupils are equal, round, and reactive to light  Comments: No pallor   Cardiovascular:      Rate and Rhythm: Normal rate and regular rhythm  Heart sounds: Normal heart sounds  No murmur  No friction rub  No gallop  Pulmonary:      Effort: Pulmonary effort is normal    Abdominal:      General: Bowel sounds are normal  There is no distension or abdominal bruit  There are no signs of injury  Palpations: Abdomen is soft   There is no shifting dullness, fluid wave, hepatomegaly, splenomegaly or pulsatile mass  Tenderness: There is no abdominal tenderness  There is no guarding  Negative signs include Nicole's sign and McBurney's sign  Hernia: No hernia is present  There is no hernia in the ventral area  Comments: Rounded  Skin:     General: Skin is warm and dry  Neurological:      Mental Status: He is alert and oriented to person, place, and time

## 2020-08-12 NOTE — PATIENT INSTRUCTIONS
Restart your Pepcid twice a day  Avoid tobacco, caffeine, marijuana products  Call your primary care provider as soon as possible to arrange follow-up evaluation for the next 7-10 days  If significant worsening of your symptoms proceed to emergency room for further evaluation

## 2020-08-13 ENCOUNTER — TELEPHONE (OUTPATIENT)
Dept: GASTROENTEROLOGY | Facility: CLINIC | Age: 35
End: 2020-08-13

## 2020-08-13 DIAGNOSIS — K57.32 DIVERTICULITIS OF LARGE INTESTINE WITHOUT PERFORATION OR ABSCESS WITHOUT BLEEDING: ICD-10-CM

## 2020-08-13 RX ORDER — SODIUM, POTASSIUM,MAG SULFATES 17.5-3.13G
180 SOLUTION, RECONSTITUTED, ORAL ORAL ONCE
Qty: 180 ML | Refills: 0 | Status: SHIPPED | OUTPATIENT
Start: 2020-08-13 | End: 2020-12-12

## 2020-08-19 ENCOUNTER — TELEPHONE (OUTPATIENT)
Dept: GASTROENTEROLOGY | Facility: CLINIC | Age: 35
End: 2020-08-19

## 2020-08-19 NOTE — TELEPHONE ENCOUNTER
Patient called to reschedule procedure to October  Patient is scheduled for 10/29/20 with Dr [facility]  Patient requested dulcolax/miralax instructions be emailed to him   Instructions given verbally/emailed

## 2020-09-04 ENCOUNTER — OFFICE VISIT (OUTPATIENT)
Dept: FAMILY MEDICINE CLINIC | Facility: CLINIC | Age: 35
End: 2020-09-04

## 2020-09-04 DIAGNOSIS — U07.1 COVID-19: Primary | ICD-10-CM

## 2020-09-04 PROCEDURE — G2012 BRIEF CHECK IN BY MD/QHP: HCPCS | Performed by: NURSE PRACTITIONER

## 2020-09-04 RX ORDER — BENZONATATE 200 MG/1
200 CAPSULE ORAL 3 TIMES DAILY PRN
Qty: 20 CAPSULE | Refills: 0 | Status: SHIPPED | OUTPATIENT
Start: 2020-09-04 | End: 2020-12-12

## 2020-09-04 RX ORDER — ALBUTEROL SULFATE 90 UG/1
2 AEROSOL, METERED RESPIRATORY (INHALATION) EVERY 6 HOURS PRN
Qty: 18 G | Refills: 0 | Status: SHIPPED | OUTPATIENT
Start: 2020-09-04 | End: 2021-03-05 | Stop reason: SDUPTHER

## 2020-09-04 RX ORDER — ONDANSETRON 4 MG/1
4 TABLET, FILM COATED ORAL EVERY 8 HOURS PRN
Qty: 20 TABLET | Refills: 0 | Status: SHIPPED | OUTPATIENT
Start: 2020-09-04 | End: 2020-12-12

## 2020-09-04 NOTE — PATIENT INSTRUCTIONS
Novel Coronavirus   AMBULATORY CARE:   The novel coronavirus (nCoV)  is a new strain (type) of coronavirus  Coronaviruses often cause mild respiratory (lung) infections, such as the common cold  They can also cause more severe infections  Examples include acute respiratory syndrome (SARS), Middle East respiratory syndrome (MERS), pneumonia, and bronchitis  The nCoV can cause more severe symptoms than earlier coronaviruses  The nCoV was first found in individuals in part of [place] at the end of 2019  The virus is spreading as people who are infected travel to other parts of the world  Signs and symptoms of nCoV  can take 2 to 14 days to develop and range from mild to severe:  · Fever    · Cough    · Shortness of breath or trouble breathing    · Pneumonia (in severe cases)    Call your local emergency number (911 in the [address] [place] [address],[address]) for any of the following:  Tell the  you may have nCoV  This will help anyone in the ambulance stay safe, and to call ahead to the hospital   · You have sudden shortness of breath  · You have a fast heartbeat or chest pain  Seek care immediately if:   · You feel so dizzy that you have trouble standing up  · Your lips and fingernails turn blue  Call your doctor if:   · You have a fever over 102ºF (39ºC)  · Your sore throat gets worse or you see white or yellow spots in your throat  · Your symptoms get worse after 3 to 5 days or your cold is not better in 14 days  · You have a rash anywhere on your skin  · You have large, tender lumps in your neck  · You have thick, green, or yellow drainage from your nose  · You cough up thick yellow, green, or bloody mucus  · You are vomiting for more than 24 hours and cannot keep fluids down  · You have a bad earache  · You have questions or concerns about your condition or care  How nCoV spreads: It is not known for sure how the virus spreads   The virus may spread in droplets when an infected person coughs or sneezes  Others become infected by breathing in the virus or getting the virus in their eyes  The virus can also possibly spread if a person touches certain animals, such as in a live market  If you develop symptoms of nCoV,  you may need to be checked  Call your doctor if you traveled within the past 14 days to an area where nCoV is active  Call your doctor if you have close contact with someone else who did  Your doctor will tell you what to do  He or she will need to take precautions in the office to protect staff members and other patients  Your doctor will take samples from your airway  The samples have to be sent to the Centers for Disease Control and Prevention (CDC) to be tested  What to do if you have nCoV:  No treatment is available for nCoV  Medicine may be given to help relieve your cough or fever, or to help you breathe more easily  Severe nCoV may need to be treated in the hospital  In the hospital, you may need breathing treatment or support, such as extra oxygen  The following can help you prevent spreading nCoV to others  Have anyone you are caring for who has nCoV also use the guidelines  · Limit close contact with others  Stay in a different room, or sleep in a separate bed  Remind others to stay at least 6 feet (2 meters) away from you while you are sick  Only go out of your house if you are going to a medical appointment  While you are recovering, always call the office of any healthcare provider you seeing  The provider will need to prepare for your arrival to keep others safe  · Wear a medical mask when you are around others  A medical mask will help prevent you from spreading the virus  You can ask others around you to wear a medical mask if you are not able to wear one  · Cover your mouth and nose to sneeze or cough  Sneeze and cough into a tissue that covers your mouth and nose  Use the bend of our arm if you do not have a tissue  Throw the tissue away in a trash can right away  Then wash your hands well with soap and water  Use hand  that contains alcohol if you cannot wash your hands  · Wash your hands often  You and everyone in your home must wash your hands throughout the day  Use soap and water  Use germ-killing gel if soap and water are not available  Do not touch your eyes or mouth if you have not washed your hands  · Do not share items with other people  Do not share dishes, towels, or other items with anyone  Always wash items after you use them  · Clean frequently touched surfaces often  Use household  or bleach diluted with water to clean counters, doorknobs, toilet seats, and other surfaces  · Do not handle live animals  You may be able to spread nCoV to animals, including pets  Until more is known, it is best not to touch, play with, or handle live animals while you are sick  Help relieve mild symptoms:   · Drink more liquids as directed  Liquids will help thin and loosen mucus so you can cough it up  Liquids will also help prevent dehydration  Liquids that help prevent dehydration include water, fruit juice, and broth  Do not drink liquids that contain caffeine  Caffeine can increase your risk for dehydration  Ask your healthcare provider how much liquid to drink each day  · Soothe a sore throat  Gargle with warm salt water  This helps your sore throat feel better  Make salt water by dissolving ¼ teaspoon salt in 1 cup warm water  You may also suck on hard candy or throat lozenges  You may use a sore throat spray  · Use a humidifier or vaporizer  Use a cool mist humidifier or a vaporizer to increase air moisture in your home  This may make it easier for you to breathe and help decrease your cough  · Use saline nasal drops as directed  These help relieve congestion  · Apply petroleum-based jelly around the outside of your nostrils  This can decrease irritation from blowing your nose  · Do not smoke    Nicotine and other chemicals in cigarettes and cigars can make your symptoms worse  They can also cause infections such as bronchitis or pneumonia  Ask your healthcare provider for information if you currently smoke and need help to quit  E-cigarettes or smokeless tobacco still contain nicotine  Talk to your healthcare provider before you use these products  Follow up with your doctor as directed:  Write down your questions so you remember to ask them during your visits  © Copyright [address] Information is for End User's use only and may not be sold, redistributed or otherwise used for commercial purposes  All illustrations and images included in CareNotes® are the copyrighted property of A D A M , Inc  or [address] [place] [address]  The above information is an  only  It is not intended as medical advice for individual conditions or treatments  Talk to your doctor, nurse or pharmacist before following any medical regimen to see if it is safe and effective for you

## 2020-09-04 NOTE — PROGRESS NOTES
COVID-19 Virtual Visit     Assessment/Plan:    Problem List Items Addressed This Visit     None      Visit Diagnoses     COVID-19    -  Primary    Relevant Medications    benzonatate (TESSALON) 200 MG capsule    ondansetron (ZOFRAN) 4 mg tablet    albuterol (Ventolin HFA) 90 mcg/act inhaler    guaiFENesin (ROBITUSSIN) 100 MG/5ML oral liquid    Other Relevant Orders    Novel Coronavirus (COVID-19), PCR [facility] - Collected at [place] [address] or [facility]        This virtual check-in was done via telephone  Disposition:      I referred Cesar to one of our centralized sites for a COVID-19 swab    Discussed infection prevention measures  Advise self quarantine at this time  Sent medications for sx relief  Continue with acetaminophen for pain/fever  Drink increased fluids and rest  If any respiratory distress present to ED  COVID-19 Counseling Check-List    *Discuss the need for immediate isolation, even before results of the test are available  *Advise patients to inform their immediate household/contacts that they may wish to be tested and quarantined as well  Review locations and people they have been in contact with in the past two weeks  *Review the signs and symptoms of COVID-19  *Inform patients that if positive, they will likely be contacted by a public health worker and asked to provide a list of the people they've been with for contact tracing, encourage them to 'answer the call'  *Discuss services that might help the patient successfully isolate and quarantine at home    I spent 10 minutes directly with the patient during this visit    Encounter provider Yanelis Potts [facility]    Provider located at [address] [place] [id number] [phone]    Recent Visits  No visits were found meeting these conditions     Showing recent visits within past 7 days and meeting all other requirements     Today's Visits  Date Type Provider Dept   09/04/20 Office Visit Eloy Smith [facility]   Showing today's visits and meeting all other requirements     Future Appointments  No visits were found meeting these conditions  Showing future appointments within next 150 days and meeting all other requirements        Patient agrees to participate in a virtual check in via telephone or video visit instead of presenting to the office to address urgent/immediate medical needs  Patient is aware this is a billable service  After connecting through telephone, the patient was identified by name and date of birth  Daniela Alonzo was informed that this was a telemedicine visit and that the exam was being conducted confidentially over secure lines  My office door was closed  No one else was in the room  Daniela Alonzo acknowledged consent and understanding of privacy and security of the telemedicine visit  I informed the patient that I have reviewed his record in Epic and presented the opportunity for him to ask any questions regarding the visit today  The patient agreed to participate  It was my intention to perform this visit via video technology but the patient was not able to do a video connection so the visit was completed via telephone audio only  Eric Moreland is a 37 y o  male who is concerned about COVID-19  He reports chills, cough, shortness of breath, anorexia, fatigue, myalgias and anosmia x 3 days  He has not had contact with a person who is under investigation for or who is positive for COVID-19 within the last 14 days  He has not been hospitalized recently for fever and/or lower respiratory symptoms       Past Medical History:   Diagnosis Date    Abscess     Allergic     Asthma     Diverticulitis     Diverticulitis of colon     Gastroesophageal reflux disease 4/9/2020    MRSA infection     of an abscess    Obesity     SIRS (systemic inflammatory response syndrome) ([facility] [address] )        Past Surgical History:   Procedure Laterality Date    FRACTURE SURGERY      INCISION AND DRAINAGE OF WOUND Left 12/23/2016    Groin    MANDIBLE FRACTURE SURGERY         Current Outpatient Medications   Medication Sig Dispense Refill    albuterol (Ventolin HFA) 90 mcg/act inhaler Inhale 2 puffs every 6 (six) hours as needed for wheezing or shortness of breath 18 g 0    benzonatate (TESSALON) 200 MG capsule Take 1 capsule (200 mg total) by mouth 3 (three) times a day as needed for cough 20 capsule 0    dicyclomine (BENTYL) 10 mg capsule Take 1 capsule (10 mg total) by mouth 4 (four) times a day (before meals and at bedtime) (Patient not taking: Reported on 8/12/2020) 120 capsule 0    famotidine (PEPCID) 20 mg tablet Take 1 tablet (20 mg total) by mouth 2 (two) times a day (Patient not taking: Reported on 8/12/2020) 60 tablet 2    famotidine (PEPCID) 20 mg tablet Take 1 tablet (20 mg total) by mouth 2 (two) times a day 30 tablet 0    fluticasone (FLONASE) 50 mcg/act nasal spray 1 spray into each nostril daily (Patient not taking: Reported on 6/9/2020) 16 g 0    guaiFENesin (ROBITUSSIN) 100 MG/5ML oral liquid Take 10 mL (200 mg total) by mouth 3 (three) times a day as needed for cough 120 mL 0    methylPREDNISolone 4 MG tablet therapy pack Use as directed on package (Patient not taking: Reported on 4/15/2020) 21 tablet 0    ondansetron (ZOFRAN) 4 mg tablet Take 1 tablet (4 mg total) by mouth every 8 (eight) hours as needed for nausea or vomiting 20 tablet 0    polyethylene glycol (GLYCOLAX) 17 GM/SCOOP powder Take 17 g by mouth daily (Patient not taking: Reported on 8/12/2020) 289 g 1    Suprep Bowel Prep Kit 17 5-3 13-1 6 GM/177ML SOLN Take 180 mL by mouth once for 1 dose 180 mL 0     No current facility-administered medications for this visit  No Known Allergies    Review of Systems   Constitutional: Positive for activity change, appetite change, chills and fatigue  HENT: Positive for congestion   Negative for sore throat and trouble swallowing  Eyes: Negative for visual disturbance  Respiratory: Positive for cough and shortness of breath  Cardiovascular: Negative for chest pain and palpitations  Gastrointestinal: Positive for diarrhea and nausea  Negative for abdominal pain and vomiting  Genitourinary: Negative for difficulty urinating and dysuria  Musculoskeletal: Positive for myalgias  Negative for arthralgias  Skin: Negative for rash  Neurological: Negative for dizziness and headaches  There were no vitals filed for this visit  VIRTUAL VISIT DISCLAIMER    Steffen Ellington acknowledges that he has consented to an online visit or consultation  He understands that the online visit is based solely on information provided by him, and that, in the absence of a face-to-face physical evaluation by the physician, the diagnosis he receives is both limited and provisional in terms of accuracy and completeness  This is not intended to replace a full medical face-to-face evaluation by the physician  Tenzin Kaur understands and accepts these terms

## 2020-10-28 RX ORDER — SODIUM CHLORIDE 9 MG/ML
125 INJECTION, SOLUTION INTRAVENOUS CONTINUOUS
Status: CANCELLED | OUTPATIENT
Start: 2020-10-28

## 2020-10-28 NOTE — TELEPHONE ENCOUNTER
Patient called to reschedule procedure due to eating [employer] and not buying prep  He is scheduled for 12/03/20 with Dr Rishi Cantu @   New miralax/dulcolax instructions emailed to patient

## 2020-10-29 ENCOUNTER — HOSPITAL ENCOUNTER (OUTPATIENT)
Dept: GASTROENTEROLOGY | Facility: HOSPITAL | Age: 35
Setting detail: OUTPATIENT SURGERY
Discharge: HOME | End: 2020-10-29
Attending: INTERNAL MEDICINE

## 2020-11-24 ENCOUNTER — TRANSCRIBE ORDERS (OUTPATIENT)
Dept: GASTROENTEROLOGY | Facility: CLINIC | Age: 35
End: 2020-11-24

## 2020-12-01 RX ORDER — SODIUM CHLORIDE 9 MG/ML
125 INJECTION, SOLUTION INTRAVENOUS CONTINUOUS
Status: CANCELLED | OUTPATIENT
Start: 2020-12-01

## 2020-12-03 ENCOUNTER — HOSPITAL ENCOUNTER (OUTPATIENT)
Dept: GASTROENTEROLOGY | Facility: HOSPITAL | Age: 35
Setting detail: OUTPATIENT SURGERY
Discharge: HOME | End: 2020-12-03
Attending: INTERNAL MEDICINE

## 2020-12-12 ENCOUNTER — HOSPITAL ENCOUNTER (EMERGENCY)
Facility: HOSPITAL | Age: 35
Discharge: HOME | End: 2020-12-12
Attending: EMERGENCY MEDICINE | Admitting: EMERGENCY MEDICINE
Payer: COMMERCIAL

## 2020-12-12 ENCOUNTER — APPOINTMENT (EMERGENCY)
Dept: RADIOLOGY | Facility: HOSPITAL | Age: 35
End: 2020-12-12
Payer: COMMERCIAL

## 2020-12-12 VITALS
SYSTOLIC BLOOD PRESSURE: 120 MMHG | DIASTOLIC BLOOD PRESSURE: 73 MMHG | WEIGHT: 280 LBS | OXYGEN SATURATION: 94 % | BODY MASS INDEX: 39.2 KG/M2 | TEMPERATURE: 98.2 F | HEIGHT: 71 IN | RESPIRATION RATE: 18 BRPM | HEART RATE: 72 BPM

## 2020-12-12 DIAGNOSIS — K57.92 DIVERTICULITIS: Primary | ICD-10-CM

## 2020-12-12 LAB
ALBUMIN SERPL BCP-MCNC: 3.9 G/DL (ref 3.5–5)
ALP SERPL-CCNC: 102 U/L (ref 46–116)
ALT SERPL W P-5'-P-CCNC: 25 U/L (ref 12–78)
ANION GAP SERPL CALCULATED.3IONS-SCNC: 6 MMOL/L (ref 4–13)
AST SERPL W P-5'-P-CCNC: 23 U/L (ref 5–45)
BACTERIA UR QL AUTO: NORMAL /HPF
BASOPHILS # BLD AUTO: 0.06 THOUSANDS/ΜL (ref 0–0.1)
BASOPHILS NFR BLD AUTO: 1 % (ref 0–1)
BILIRUB SERPL-MCNC: 0.35 MG/DL (ref 0.2–1)
BILIRUB UR QL STRIP: NEGATIVE
BUN SERPL-MCNC: 14 MG/DL (ref 5–25)
CALCIUM SERPL-MCNC: 9.7 MG/DL (ref 8.3–10.1)
CHLORIDE SERPL-SCNC: 107 MMOL/L (ref 100–108)
CLARITY UR: ABNORMAL
CO2 SERPL-SCNC: 26 MMOL/L (ref 21–32)
COLOR UR: ABNORMAL
COLOR, POC: ABNORMAL
CREAT SERPL-MCNC: 0.97 MG/DL (ref 0.6–1.3)
EOSINOPHIL # BLD AUTO: 0.21 THOUSAND/ΜL (ref 0–0.61)
EOSINOPHIL NFR BLD AUTO: 2 % (ref 0–6)
ERYTHROCYTE [DISTWIDTH] IN BLOOD BY AUTOMATED COUNT: 13.2 % (ref 11.6–15.1)
GFR SERPL CREATININE-BSD FRML MDRD: 115 ML/MIN/1.73SQ M
GLUCOSE SERPL-MCNC: 101 MG/DL (ref 65–140)
GLUCOSE UR STRIP-MCNC: NEGATIVE MG/DL
HCT VFR BLD AUTO: 45.7 % (ref 36.5–49.3)
HGB BLD-MCNC: 15 G/DL (ref 12–17)
HGB UR QL STRIP.AUTO: ABNORMAL
HYALINE CASTS #/AREA URNS LPF: NORMAL /LPF
IMM GRANULOCYTES # BLD AUTO: 0.05 THOUSAND/UL (ref 0–0.2)
IMM GRANULOCYTES NFR BLD AUTO: 0 % (ref 0–2)
KETONES UR STRIP-MCNC: NEGATIVE MG/DL
LEUKOCYTE ESTERASE UR QL STRIP: NEGATIVE
LIPASE SERPL-CCNC: 144 U/L (ref 73–393)
LYMPHOCYTES # BLD AUTO: 2.06 THOUSANDS/ΜL (ref 0.6–4.47)
LYMPHOCYTES NFR BLD AUTO: 16 % (ref 14–44)
MCH RBC QN AUTO: 29 PG (ref 26.8–34.3)
MCHC RBC AUTO-ENTMCNC: 32.8 G/DL (ref 31.4–37.4)
MCV RBC AUTO: 88 FL (ref 82–98)
MONOCYTES # BLD AUTO: 0.61 THOUSAND/ΜL (ref 0.17–1.22)
MONOCYTES NFR BLD AUTO: 5 % (ref 4–12)
NEUTROPHILS # BLD AUTO: 10.32 THOUSANDS/ΜL (ref 1.85–7.62)
NEUTS SEG NFR BLD AUTO: 76 % (ref 43–75)
NITRITE UR QL STRIP: NEGATIVE
NON-SQ EPI CELLS URNS QL MICRO: NORMAL /HPF
NRBC BLD AUTO-RTO: 0 /100 WBCS
PH UR STRIP.AUTO: 7 [PH] (ref 4.5–8)
PLATELET # BLD AUTO: 269 THOUSANDS/UL (ref 149–390)
PMV BLD AUTO: 9.4 FL (ref 8.9–12.7)
POTASSIUM SERPL-SCNC: 4.2 MMOL/L (ref 3.5–5.3)
PROT SERPL-MCNC: 8 G/DL (ref 6.4–8.2)
PROT UR STRIP-MCNC: ABNORMAL MG/DL
RBC # BLD AUTO: 5.18 MILLION/UL (ref 3.88–5.62)
RBC #/AREA URNS AUTO: NORMAL /HPF
SODIUM SERPL-SCNC: 139 MMOL/L (ref 136–145)
SP GR UR STRIP.AUTO: >=1.03 (ref 1–1.03)
UROBILINOGEN UR QL STRIP.AUTO: 0.2 E.U./DL
WBC # BLD AUTO: 13.31 THOUSAND/UL (ref 4.31–10.16)
WBC #/AREA URNS AUTO: NORMAL /HPF

## 2020-12-12 PROCEDURE — 83690 ASSAY OF LIPASE: CPT | Performed by: EMERGENCY MEDICINE

## 2020-12-12 PROCEDURE — 81001 URINALYSIS AUTO W/SCOPE: CPT

## 2020-12-12 PROCEDURE — 96374 THER/PROPH/DIAG INJ IV PUSH: CPT

## 2020-12-12 PROCEDURE — 96375 TX/PRO/DX INJ NEW DRUG ADDON: CPT

## 2020-12-12 PROCEDURE — 99284 EMERGENCY DEPT VISIT MOD MDM: CPT | Performed by: EMERGENCY MEDICINE

## 2020-12-12 PROCEDURE — 80053 COMPREHEN METABOLIC PANEL: CPT | Performed by: EMERGENCY MEDICINE

## 2020-12-12 PROCEDURE — G1004 CDSM NDSC: HCPCS

## 2020-12-12 PROCEDURE — 85025 COMPLETE CBC W/AUTO DIFF WBC: CPT | Performed by: EMERGENCY MEDICINE

## 2020-12-12 PROCEDURE — 74177 CT ABD & PELVIS W/CONTRAST: CPT

## 2020-12-12 PROCEDURE — 99284 EMERGENCY DEPT VISIT MOD MDM: CPT

## 2020-12-12 PROCEDURE — 36415 COLL VENOUS BLD VENIPUNCTURE: CPT | Performed by: EMERGENCY MEDICINE

## 2020-12-12 RX ORDER — AMOXICILLIN AND CLAVULANATE POTASSIUM 875; 125 MG/1; MG/1
1 TABLET, FILM COATED ORAL EVERY 12 HOURS
Qty: 20 TABLET | Refills: 0 | Status: SHIPPED | OUTPATIENT
Start: 2020-12-12 | End: 2020-12-22

## 2020-12-12 RX ORDER — ONDANSETRON 2 MG/ML
4 INJECTION INTRAMUSCULAR; INTRAVENOUS ONCE
Status: COMPLETED | OUTPATIENT
Start: 2020-12-12 | End: 2020-12-12

## 2020-12-12 RX ORDER — KETOROLAC TROMETHAMINE 30 MG/ML
15 INJECTION, SOLUTION INTRAMUSCULAR; INTRAVENOUS ONCE
Status: COMPLETED | OUTPATIENT
Start: 2020-12-12 | End: 2020-12-12

## 2020-12-12 RX ADMIN — ONDANSETRON 4 MG: 2 INJECTION INTRAMUSCULAR; INTRAVENOUS at 10:29

## 2020-12-12 RX ADMIN — IOHEXOL 100 ML: 350 INJECTION, SOLUTION INTRAVENOUS at 12:37

## 2020-12-12 RX ADMIN — KETOROLAC TROMETHAMINE 15 MG: 30 INJECTION, SOLUTION INTRAMUSCULAR at 10:29

## 2021-01-19 ENCOUNTER — OFFICE VISIT (OUTPATIENT)
Dept: URGENT CARE | Age: 36
End: 2021-01-19
Payer: COMMERCIAL

## 2021-01-19 VITALS
OXYGEN SATURATION: 100 % | RESPIRATION RATE: 20 BRPM | DIASTOLIC BLOOD PRESSURE: 74 MMHG | HEART RATE: 88 BPM | BODY MASS INDEX: 39.2 KG/M2 | TEMPERATURE: 98 F | HEIGHT: 71 IN | SYSTOLIC BLOOD PRESSURE: 142 MMHG | WEIGHT: 280 LBS

## 2021-01-19 DIAGNOSIS — R20.2 PARESTHESIA OF RIGHT ARM: Primary | ICD-10-CM

## 2021-01-19 DIAGNOSIS — L02.415 ABSCESS OF RIGHT THIGH: ICD-10-CM

## 2021-01-19 PROCEDURE — 99213 OFFICE O/P EST LOW 20 MIN: CPT | Performed by: PHYSICIAN ASSISTANT

## 2021-01-19 PROCEDURE — G0382 LEV 3 HOSP TYPE B ED VISIT: HCPCS | Performed by: PHYSICIAN ASSISTANT

## 2021-01-19 PROCEDURE — 99283 EMERGENCY DEPT VISIT LOW MDM: CPT | Performed by: PHYSICIAN ASSISTANT

## 2021-01-19 RX ORDER — CEPHALEXIN 500 MG/1
500 CAPSULE ORAL EVERY 8 HOURS SCHEDULED
Qty: 21 CAPSULE | Refills: 0 | Status: SHIPPED | OUTPATIENT
Start: 2021-01-19 | End: 2021-01-26

## 2021-01-20 NOTE — PROGRESS NOTES
[facility]        NAME: Spring Pollack is a 38 y o  male  : 1985    MRN: [id number]  DATE: 2021  TIME: 7:19 PM    Assessment and Plan   Paresthesia of right arm [R20 2]  1  Paresthesia of right arm     2  Abscess of right thigh  cephalexin (KEFLEX) 500 mg capsule     Symptoms likely due to pinched nerve in arm from sleeping with his arms under his pillows while he is on his belly  I educated him that in this setting I cannot rule out a more serious cause inside the brain and while it is unlikely I can not rule it out which could be potentially fatal   I offered to send him to the ER he states that he understands and agrees that does not want to go to the ER, he agrees with my assessment that it is likely a pinched nerve  We discussed changing sleeping position, using NSAIDs  Educated on signs and symptoms of worsening condition in indications go immediately to ER  He states he understands and agrees  Patient Instructions       Continue to monitor symptoms  If new or worsening symptoms develop, go immediately to Er  Drink plenty of fluids  Follow up with Family Doctor this week  Chief Complaint     Chief Complaint   Patient presents with    Hand Pain     denies any injury or trauma states he woke up this morning with  his hand feeling heavy like it was "asleep" hand has gotten better throughout the day  pt states he feels sore  History of Present Illness       Patient states that he has a strange sensation starting in the right arm radiating down into his fingertips  Patient states that he woke up with that  Patient states that it was worse this morning and has slowly improved throughout the day  Denies any weakness  Patient states that he just feels like "his arm is asleep "  Patient states that he is moving it without any pain  Patient states that he has no loss of strength  Patient has full sensation in his hand states that his arm just feels funny  Patient admits that he sleeps on his belly with his right arm under his pillow every night  No personal history of stroke, DVT, PE  In passing, patient mentions that he has a large abscess on his right thigh  Patient states that he popped the today and he got a lot of pus out  Patient pulled his pant leg to show me  No fevers or chills  No other symptoms  Review of Systems   Review of Systems   Constitutional: Negative for chills, diaphoresis, fatigue and fever  HENT: Negative for congestion, drooling, facial swelling, hearing loss, postnasal drip, rhinorrhea, sinus pressure, sinus pain, sneezing, sore throat and trouble swallowing  Eyes: Negative  Respiratory: Negative for chest tightness, shortness of breath and wheezing  Cardiovascular: Negative  Negative for chest pain and palpitations  Gastrointestinal: Negative for abdominal pain, diarrhea, nausea and vomiting  Endocrine: Negative  Genitourinary: Negative for dysuria  Musculoskeletal: Negative  Negative for back pain and neck pain  Skin: Negative for pallor and rash  Allergic/Immunologic: Negative  Neurological: Negative  Negative for dizziness, tremors, seizures, syncope, facial asymmetry, speech difficulty, weakness, light-headedness and headaches  Hematological: Negative  Psychiatric/Behavioral: Negative            Current Medications       Current Outpatient Medications:     albuterol (Ventolin HFA) 90 mcg/act inhaler, Inhale 2 puffs every 6 (six) hours as needed for wheezing or shortness of breath, Disp: 18 g, Rfl: 0    cephalexin (KEFLEX) 500 mg capsule, Take 1 capsule (500 mg total) by mouth every 8 (eight) hours for 7 days, Disp: 21 capsule, Rfl: 0    famotidine (PEPCID) 20 mg tablet, Take 1 tablet (20 mg total) by mouth 2 (two) times a day, Disp: 30 tablet, Rfl: 0    Current Allergies     Allergies as of 01/19/2021    (No Known Allergies)            The following portions of the patient's history were reviewed and updated as appropriate: allergies, current medications, past family history, past medical history, past social history, past surgical history and problem list      Past Medical History:   Diagnosis Date    Abscess     Allergic     Asthma     Diverticulitis     Diverticulitis of colon     Gastroesophageal reflux disease 4/9/2020    MRSA infection     of an abscess    Obesity     SIRS (systemic inflammatory response syndrome) ([facility] [address] )        Past Surgical History:   Procedure Laterality Date    FRACTURE SURGERY      INCISION AND DRAINAGE OF WOUND Left 12/23/2016    Groin    MANDIBLE FRACTURE SURGERY         Family History   Problem Relation Age of Onset    Diabetes Maternal Aunt     Asthma Mother     Depression Mother     No Known Problems Sister     No Known Problems Brother     No Known Problems Sister     No Known Problems Sister     No Known Problems Brother     No Known Problems Brother     No Known Problems Brother     No Known Problems Brother     No Known Problems Brother          Medications have been verified  Objective   /74   Pulse 88   Temp 98 °F (36 7 °C)   Resp 20   Ht 5' 11" (1 803 m)   Wt 127 kg (280 lb)   SpO2 100%   BMI 39 05 kg/m²        Physical Exam     Physical Exam  Vitals signs and nursing note reviewed  Constitutional:       General: He is not in acute distress  Appearance: Normal appearance  He is well-developed  He is not ill-appearing or diaphoretic  HENT:      Head: Normocephalic and atraumatic  Comments: No facial droop, no tongue or uvular deviation     Right Ear: Tympanic membrane, ear canal and external ear normal       Left Ear: Tympanic membrane, ear canal and external ear normal       Nose: Nose normal  No congestion or rhinorrhea  Mouth/Throat:      Pharynx: No oropharyngeal exudate or posterior oropharyngeal erythema  Eyes:      General:         Right eye: No discharge  Left eye: No discharge  Conjunctiva/sclera: Conjunctivae normal    Neck:      Musculoskeletal: Normal range of motion and neck supple  Cardiovascular:      Rate and Rhythm: Normal rate and regular rhythm  Heart sounds: Normal heart sounds  Pulmonary:      Effort: Pulmonary effort is normal  No respiratory distress  Breath sounds: Normal breath sounds  No wheezing, rhonchi or rales  Musculoskeletal:      Comments: Full range of motion of right shoulder elbow wrist hands without pain  No tenderness to palpation swelling deformity or any abnormality found in any aspect of right shoulder or arm   Lymphadenopathy:      Cervical: No cervical adenopathy  Skin:     General: Skin is warm  Capillary Refill: Capillary refill takes less than 2 seconds  Comments: Patient has roughly quarter-sized erythematous abscess to right thigh that is currently draining  No venous streaking   Neurological:      General: No focal deficit present  Mental Status: He is alert and oriented to person, place, and time  Mental status is at baseline  GCS: GCS eye subscore is 4  GCS verbal subscore is 5  GCS motor subscore is 6  Cranial Nerves: Cranial nerves are intact  No cranial nerve deficit  Sensory: Sensation is intact  No sensory deficit  Motor: Motor function is intact  No weakness, tremor, abnormal muscle tone or pronator drift  Coordination: Coordination is intact  Romberg sign negative   Coordination normal  Finger-Nose-Finger Test normal       Gait: Gait normal       Deep Tendon Reflexes: Reflexes normal       Comments: Neurological exam completely unremarkable

## 2021-01-20 NOTE — PATIENT INSTRUCTIONS
Continue to monitor symptoms  If new or worsening symptoms develop, go immediately to Er  Drink plenty of fluids  Follow up with Family Doctor this week  Paresthesia   WHAT YOU NEED TO KNOW:   Paresthesia is numbness, tingling, or burning  It can happen in any part of your body, but usually occurs in your legs, feet, arms, or hands  DISCHARGE INSTRUCTIONS:   Return to the emergency department if:   · You have severe pain along with numbness and tingling  · Your legs suddenly become cold  You have trouble moving your legs, and they ache  · You have increased weakness in a part of your body  · You have uncontrolled movements  Contact your healthcare provider or neurologist if:   · Your symptoms do not improve  · You have symptoms in more than one part of your body  · You have questions or concerns about your condition or care  Manage paresthesia:   · Protect the area from injury  You may injure or burn yourself if you lose feeling in the area  Be careful when you touch anything that could be hot  Wear sturdy shoes to protect your feet  Ask about other ways to protect yourself  · Go to physical or occupational therapy if directed  Your provider may recommend therapy if you have a condition such as carpal tunnel syndrome  A physical therapist can teach you exercises to help strengthen the area or increase your ability to move it  An occupational therapist can help you find new ways to do your daily activities  · Manage health conditions that can cause paresthesia  Work with your diabetes specialist if you have uncontrolled diabetes  A dietitian or your healthcare provider can help you create a meal plan if you have low vitamin B levels  Your provider can help you manage your health if you have multiple sclerosis or you had a stroke  It is important to manage health conditions to stop paresthesia or prevent it from getting worse      Follow up with your healthcare provider or neurologist as directed: Your healthcare provider may refer you to a specialist  Write down your questions so you remember to ask them during your visits  © Copyright [address] Information is for End User's use only and may not be sold, redistributed or otherwise used for commercial purposes  All illustrations and images included in CareNotes® are the copyrighted property of [employer]  or [employer]   The above information is an  only  It is not intended as medical advice for individual conditions or treatments  Talk to your doctor, nurse or pharmacist before following any medical regimen to see if it is safe and effective for you  Abscess   WHAT YOU NEED TO KNOW:   A warm compress may help your abscess drain  Your healthcare provider may make a cut in the abscess so it can drain  You may need surgery to remove an abscess that is on your hands or buttocks  DISCHARGE INSTRUCTIONS:   Return to the emergency department if:   · The area around your abscess becomes very painful, warm, or has red streaks  · You have a fever and chills  · Your heart is beating faster than usual      · You feel faint or confused  Contact your healthcare provider if:   · Your abscess gets bigger or does not get better  · Your abscess returns  · You have questions or concerns about your condition or care  Medicines: You may  need any of the following:  · Antibiotics  help treat a bacterial infection  · Acetaminophen  decreases pain and fever  It is available without a doctor's order  Ask how much to take and how often to take it  Follow directions  Acetaminophen can cause liver damage if not taken correctly  · NSAIDs , such as ibuprofen, help decrease swelling, pain, and fever  This medicine is available with or without a doctor's order  NSAIDs can cause stomach bleeding or kidney problems in certain people   If you take blood thinner medicine, always ask your healthcare provider if NSAIDs are safe for you  Always read the medicine label and follow directions  · Take your medicine as directed  Contact your healthcare provider if you think your medicine is not helping or if you have side effects  Tell him or her if you are allergic to any medicine  Keep a list of the medicines, vitamins, and herbs you take  Include the amounts, and when and why you take them  Bring the list or the pill bottles to follow-up visits  Carry your medicine list with you in case of an emergency  Self-care:   · Apply a warm compress to your abscess  This will help it open and drain  Wet a washcloth in warm, but not hot, water  Apply the compress for 10 minutes  Repeat this 4 times each day  Do not  press on an abscess or try to open it with a needle  You may push the bacteria deeper or into your blood  · Do not share your clothes, towels, or sheets with anyone  This can spread the infection to others  · Wash your hands often  This can help prevent the spread of germs  Use soap and water or an alcohol-based hand rub  Care for your wound after it is drained:   · Care for your wound as directed  If your healthcare provider says it is okay, carefully remove the bandage and gauze packing  You may need to soak the gauze to get it out of your wound  Clean your wound and the area around it as directed  Dry the area and put on new, clean bandages  Change your bandages when they get wet or dirty  · Ask your healthcare provider how to change the gauze in your wound  Keep track of how many pieces of gauze are placed inside the wound  Do not put too much packing in the wound  Do not pack the gauze too tightly in your wound  Follow up with your healthcare provider in 1 to 3 days: You may need to have your packing removed or your bandage changed  Write down your questions so you remember to ask them during your visits     © Copyright [employer] 2020 Information is for End User's use only and may not be sold, redistributed or otherwise used for commercial purposes  All illustrations and images included in CareNotes® are the copyrighted property of [employer]  or [employer]  The above information is an  only  It is not intended as medical advice for individual conditions or treatments  Talk to your doctor, nurse or pharmacist before following any medical regimen to see if it is safe and effective for you

## 2021-01-26 ENCOUNTER — HOSPITAL ENCOUNTER (EMERGENCY)
Facility: HOSPITAL | Age: 36
Discharge: HOME | End: 2021-01-27
Attending: EMERGENCY MEDICINE | Admitting: EMERGENCY MEDICINE
Payer: COMMERCIAL

## 2021-01-26 DIAGNOSIS — Z20.822 SUSPECTED COVID-19 VIRUS INFECTION: ICD-10-CM

## 2021-01-26 DIAGNOSIS — J02.9 PHARYNGITIS: Primary | ICD-10-CM

## 2021-01-26 PROCEDURE — 96372 THER/PROPH/DIAG INJ SC/IM: CPT

## 2021-01-26 PROCEDURE — 99283 EMERGENCY DEPT VISIT LOW MDM: CPT

## 2021-01-26 PROCEDURE — 99284 EMERGENCY DEPT VISIT MOD MDM: CPT | Performed by: EMERGENCY MEDICINE

## 2021-01-26 RX ORDER — ACETAMINOPHEN 325 MG/1
975 TABLET ORAL ONCE
Status: COMPLETED | OUTPATIENT
Start: 2021-01-26 | End: 2021-01-26

## 2021-01-26 RX ORDER — KETOROLAC TROMETHAMINE 30 MG/ML
15 INJECTION, SOLUTION INTRAMUSCULAR; INTRAVENOUS ONCE
Status: COMPLETED | OUTPATIENT
Start: 2021-01-26 | End: 2021-01-26

## 2021-01-26 RX ADMIN — KETOROLAC TROMETHAMINE 15 MG: 30 INJECTION, SOLUTION INTRAMUSCULAR; INTRAVENOUS at 23:46

## 2021-01-26 RX ADMIN — ACETAMINOPHEN 975 MG: 325 TABLET, FILM COATED ORAL at 23:46

## 2021-01-27 VITALS
TEMPERATURE: 99.4 F | BODY MASS INDEX: 38.35 KG/M2 | WEIGHT: 275 LBS | OXYGEN SATURATION: 97 % | DIASTOLIC BLOOD PRESSURE: 80 MMHG | HEART RATE: 83 BPM | SYSTOLIC BLOOD PRESSURE: 140 MMHG | RESPIRATION RATE: 18 BRPM

## 2021-01-27 LAB — SARS-COV-2 RNA RESP QL NAA+PROBE: NEGATIVE

## 2021-01-27 PROCEDURE — U0003 INFECTIOUS AGENT DETECTION BY NUCLEIC ACID (DNA OR RNA); SEVERE ACUTE RESPIRATORY SYNDROME CORONAVIRUS 2 (SARS-COV-2) (CORONAVIRUS DISEASE [COVID-19]), AMPLIFIED PROBE TECHNIQUE, MAKING USE OF HIGH THROUGHPUT TECHNOLOGIES AS DESCRIBED BY CMS-2020-01-R: HCPCS | Performed by: EMERGENCY MEDICINE

## 2021-01-27 PROCEDURE — U0005 INFEC AGEN DETEC AMPLI PROBE: HCPCS | Performed by: EMERGENCY MEDICINE

## 2021-01-27 NOTE — ED PROVIDER NOTES
Final Diagnosis:  1  Pharyngitis    2  Suspected COVID-19 virus infection         Chief Complaint   Patient presents with    Sore Throat     sore throat and sinus congestion since yesterday, chills, also reports bilat arm and leg numbness intermittently       ASSESSMENT + PLAN:   - Nursing note reviewed  1  Covid R/O  -Patient currently stable from an airway perspective with appropriate ambulatory pulse ox  -Covid-19 swab  -Symptomatic control  -Will reassess given tachycardia after toradol, tylenol, PO fluids  -Feeling slightly better and tachycardia resolved  -Offered IV fluids and labs, but declined    This patient was possibly exposed to Covid-19 and so we will test the patient for Covid-19  Ambulatory and resting pulse ox stable  Patient is currently symptomatic  We will provide appropriate quarantine instructions  The patient will be notified in the next couple days whether not the test is positive  They are to return to the emergency department if they have worsening SOB or any other concerning symptoms  2  Sore throat  -No evidence of erythema/exudate, PTA  -Tylenol, toradol  -Likely viral, will have him f/u with PCP    Final Dispo     Patient was reassessed  Vital signs stable  Patient and/or family given discharge instructions and return precautions  Patient and/or family was reassured  The patient and/or family vocalizes understanding  Answered all of the patient's and/or family's questions  Will follow up with PCP (number provided)  Patient and/or family are agreeable to the plan        Medications   ketorolac (TORADOL) injection 15 mg (15 mg Intramuscular Given 1/26/21 2346)   acetaminophen (TYLENOL) tablet 975 mg (975 mg Oral Given 1/26/21 2346)     Time reflects when diagnosis was documented in both MDM as applicable and the Disposition within this note     Time User Action Codes Description Comment    1/27/2021 12:43 AM Alfred Jenkins Add [J02 9] Pharyngitis     1/27/2021 12:43 AM Harry Hannah Amin Add [Z20 822] Suspected COVID-19 virus infection       ED Disposition     ED Disposition Condition Date/Time Comment    Discharge Stable Wed Jan 27, 2021 12:41 AM Hemant Kaur discharge to home/self care  Follow-up Information     Follow up With Specialties Details Why Contact Info    [employer]  Call   [phone]          Discharge Medication List as of 1/27/2021 12:45 AM      CONTINUE these medications which have NOT CHANGED    Details   albuterol (Ventolin HFA) 90 mcg/act inhaler Inhale 2 puffs every 6 (six) hours as needed for wheezing or shortness of breath, Starting Fri 9/4/2020, Normal      famotidine (PEPCID) 20 mg tablet Take 1 tablet (20 mg total) by mouth 2 (two) times a day, Starting Wed 8/12/2020, Normal           No discharge procedures on file  Prior to Admission Medications   Prescriptions Last Dose Informant Patient Reported? Taking? albuterol (Ventolin HFA) 90 mcg/act inhaler   No Yes   Sig: Inhale 2 puffs every 6 (six) hours as needed for wheezing or shortness of breath   famotidine (PEPCID) 20 mg tablet   No Yes   Sig: Take 1 tablet (20 mg total) by mouth 2 (two) times a day      Facility-Administered Medications: None       History of Present Illness: This is a 38 y o  male presenting today for evaluation of sore throat  Patient says for the past 2 days he has had a sore throat, fever and chills  He also endorses some mild congestion  He says he has been taking Tylenol without any relief of his symptoms  Denies any nausea, vomiting, diarrhea  Patient denies any chest pain or shortness of breath  Patient does have asthma, and does use his inhaler  He also smokes a pack per day  Patient was taking antibiotics for abscess in his thigh  At this time, it looks much improved  No evidence of erythema or fluctuance  - No language barrier    - History obtained from patient and chart   - There are no limitations to the history obtained    - Previous charting was reviewed  Some data reviewed included below for ease of access whether or not it is relevant to this patient encounter  Past Medical, Past Surgical History:    has a past medical history of Abscess, Allergic, Asthma, Diverticulitis, Diverticulitis of colon, Gastroesophageal reflux disease (4/9/2020), MRSA infection, Obesity, and SIRS (systemic inflammatory response syndrome) ([facility] [address] )  has a past surgical history that includes Fracture surgery; Mandible fracture surgery; and Incision and drainage of wound (Left, 12/23/2016)  Allergies:   No Known Allergies    Social and Family History:     Social History     Substance and Sexual Activity   Alcohol Use Not Currently    Frequency: Never     Social History     Tobacco Use   Smoking Status Current Every Day Smoker    Packs/day: 0 50    Years: 15 00    Pack years: 7 50    Types: Cigarettes   Smokeless Tobacco Never Used     Social History     Substance and Sexual Activity   Drug Use Yes    Frequency: 7 0 times per week    Types: Marijuana    Comment: multiple times daily        Review of Systems:   Review of Systems   Constitutional: Positive for chills and fever  Negative for diaphoresis  HENT: Positive for sore throat  Eyes: Negative  Negative for visual disturbance  Respiratory: Negative  Negative for shortness of breath  Cardiovascular: Negative  Negative for chest pain  Gastrointestinal: Negative  Negative for abdominal pain, nausea and vomiting  Endocrine: Negative  Genitourinary: Negative  Musculoskeletal: Negative  Negative for myalgias  Skin: Negative  Negative for rash  Allergic/Immunologic: Negative  Neurological: Negative  Negative for weakness, light-headedness, numbness and headaches  Hematological: Negative  Psychiatric/Behavioral: Negative  All other systems reviewed and are negative         Physical Examination     Vitals:    01/26/21 2326 01/26/21 2359 01/27/21 0012 01/27/21 0025   BP:       BP Location:       Pulse:  (!) 108 103 83   Resp: 18      Temp:       TempSrc:       SpO2:       Weight:         Vitals reviewed by me  Physical Exam  Vitals signs and nursing note reviewed  Constitutional:       General: He is not in acute distress  Appearance: He is well-developed  Comments: Sick, but not toxic   HENT:      Head: Normocephalic and atraumatic  Right Ear: Tympanic membrane normal       Left Ear: Tympanic membrane normal       Mouth/Throat: Tonsils: No tonsillar exudate or tonsillar abscesses  Eyes:      Conjunctiva/sclera: Conjunctivae normal    Neck:      Musculoskeletal: Normal range of motion and neck supple  Cardiovascular:      Rate and Rhythm: Regular rhythm  Tachycardia present  Pulmonary:      Effort: Pulmonary effort is normal       Breath sounds: Normal breath sounds  Abdominal:      General: There is no distension  Palpations: Abdomen is soft  Tenderness: There is no abdominal tenderness  Musculoskeletal: Normal range of motion  Skin:     General: Skin is warm and dry  Comments: Healing skin where abscess used to be in his R thigh   Neurological:      Mental Status: He is alert and oriented to person, place, and time  Comments: Grossly neuro intact; Able to move all extremities                              No orders to display     Orders Placed This Encounter   Procedures    Novel Coronavirus (Covid-19),PCR [facility]       Labs:   Labs Reviewed - No data to display    Imaging:   No results found  Final Diagnosis:  1  Pharyngitis    2  Suspected COVID-19 virus infection        Code Status: Prior    Portions of the record may have been created with voice recognition software  Occasional wrong word or "sound a like" substitutions may have occurred due to the inherent limitations of voice recognition software  Read the chart carefully and recognize, using context, where substitutions have occurred      Electronically signed by:  Fredis Roberson PGY 2, MD Jing Gillis MD  01/27/21 1837

## 2021-01-27 NOTE — DISCHARGE INSTRUCTIONS
Patient Instructions: Today you were seen in the emergency department for fever and sore throat  We examined you and determined that you would be able to be discharged  We did offer IV fluids and labs but at this time you declined  You should take ibuprofen and tylenol as needed for your fever  Drink lots of fluids    You should follow up with a primary care doctor (number provided)  Please return to the emergency department if your symptoms get worse, you develop shortness of breath, or you have any other symptoms we discussed today prior to discharge  Nice to meet you! Best of luck with everything!

## 2021-02-22 PROBLEM — K57.30 DIVERTICULOSIS OF LARGE INTESTINE WITHOUT HEMORRHAGE: Status: ACTIVE | Noted: 2017-07-25

## 2021-02-22 PROBLEM — B35.1 ONYCHOMYCOSIS: Status: ACTIVE | Noted: 2017-07-25

## 2021-03-05 ENCOUNTER — OFFICE VISIT (OUTPATIENT)
Dept: FAMILY MEDICINE CLINIC | Facility: CLINIC | Age: 36
End: 2021-03-05

## 2021-03-05 VITALS
BODY MASS INDEX: 38.81 KG/M2 | RESPIRATION RATE: 20 BRPM | SYSTOLIC BLOOD PRESSURE: 138 MMHG | TEMPERATURE: 98.7 F | HEART RATE: 98 BPM | OXYGEN SATURATION: 96 % | WEIGHT: 277.2 LBS | DIASTOLIC BLOOD PRESSURE: 86 MMHG | HEIGHT: 71 IN

## 2021-03-05 DIAGNOSIS — E66.01 MORBID OBESITY (HCC): ICD-10-CM

## 2021-03-05 DIAGNOSIS — K57.92 DIVERTICULITIS: Primary | ICD-10-CM

## 2021-03-05 DIAGNOSIS — K21.9 GASTROESOPHAGEAL REFLUX DISEASE, UNSPECIFIED WHETHER ESOPHAGITIS PRESENT: ICD-10-CM

## 2021-03-05 DIAGNOSIS — R10.13 EPIGASTRIC PAIN: ICD-10-CM

## 2021-03-05 DIAGNOSIS — U07.1 COVID-19: ICD-10-CM

## 2021-03-05 DIAGNOSIS — F17.200 CURRENT SMOKER: ICD-10-CM

## 2021-03-05 DIAGNOSIS — Z11.4 SCREENING FOR HIV (HUMAN IMMUNODEFICIENCY VIRUS): ICD-10-CM

## 2021-03-05 DIAGNOSIS — Z11.3 ROUTINE SCREENING FOR STI (SEXUALLY TRANSMITTED INFECTION): ICD-10-CM

## 2021-03-05 DIAGNOSIS — Z00.00 ANNUAL PHYSICAL EXAM: ICD-10-CM

## 2021-03-05 DIAGNOSIS — J45.20 MILD INTERMITTENT ASTHMA WITHOUT COMPLICATION: ICD-10-CM

## 2021-03-05 PROCEDURE — 99395 PREV VISIT EST AGE 18-39: CPT | Performed by: NURSE PRACTITIONER

## 2021-03-05 RX ORDER — PANTOPRAZOLE SODIUM 40 MG/1
40 TABLET, DELAYED RELEASE ORAL DAILY
Qty: 90 TABLET | Refills: 1 | Status: SHIPPED | OUTPATIENT
Start: 2021-03-05 | End: 2022-05-04 | Stop reason: SDUPTHER

## 2021-03-05 RX ORDER — ALBUTEROL SULFATE 90 UG/1
2 AEROSOL, METERED RESPIRATORY (INHALATION) EVERY 6 HOURS PRN
Qty: 18 G | Refills: 0 | Status: SHIPPED | OUTPATIENT
Start: 2021-03-05 | End: 2022-05-04 | Stop reason: SDUPTHER

## 2021-03-05 RX ORDER — FAMOTIDINE 20 MG/1
20 TABLET, FILM COATED ORAL 2 TIMES DAILY
Qty: 30 TABLET | Refills: 0 | Status: CANCELLED | OUTPATIENT
Start: 2021-03-05

## 2021-03-05 NOTE — ASSESSMENT & PLAN NOTE
Stop Pepcid, start Protonix   Discussed smoking cessation   If no improvement will refer to GI   -Discussed diet and lifestyle interventions to improve sx including: avoidance of common triggers (chocolate, caffeine, tomatoes, citrus), eat small meals frequently, remain upright after meals

## 2021-03-05 NOTE — ASSESSMENT & PLAN NOTE
-patient is not interested in quitting ---continue to encourage cessation  -patient is aware of pharmacotherapy options for aiding in cessation

## 2021-03-05 NOTE — PROGRESS NOTES
[facility]    NAME: Ashley Range  AGE: 38 y o  SEX: male  : 1985     DATE: 3/5/2021     Assessment and Plan:     Problem List Items Addressed This Visit        Digestive    Gastroesophageal reflux disease     Stop Pepcid, start Protonix   Discussed smoking cessation   If no improvement will refer to GI   -Discussed diet and lifestyle interventions to improve sx including: avoidance of common triggers (chocolate, caffeine, tomatoes, citrus), eat small meals frequently, remain upright after meals            Relevant Medications    pantoprazole (PROTONIX) 40 mg tablet       Respiratory    Mild intermittent asthma without complication    Relevant Medications    albuterol (Ventolin HFA) 90 mcg/act inhaler       Other    Abdominal pain    Current smoker     -patient is not interested in quitting ---continue to encourage cessation  -patient is aware of pharmacotherapy options for aiding in cessation            Diverticulitis - Primary    Morbid obesity ([facility] [address] )    Relevant Orders    Ambulatory referral to Weight Management    CBC and differential    Comprehensive metabolic panel    Hemoglobin A1C    Lipid panel    TSH, 3rd generation with Free T4 reflex      Other Visit Diagnoses     COVID-19        Relevant Medications    albuterol (Ventolin HFA) 90 mcg/act inhaler    Annual physical exam        Screening for HIV (human immunodeficiency virus)        Relevant Orders    HIV 1/2 Antigen/Antibody (4th Generation) w Reflex [facility]    Routine screening for STI (sexually transmitted infection)        Relevant Orders    RPR    Chlamydia/GC amplified DNA by PCR    Chronic Hepatitis Panel          Immunizations and preventive care screenings were discussed with patient today  Appropriate education was printed on patient's after visit summary      Counseling:  Alcohol/drug use: discussed moderation in alcohol intake, the recommendations for healthy alcohol use, and avoidance of illicit drug use  Dental Health: discussed importance of regular tooth brushing, flossing, and dental visits  Injury prevention: discussed safety/seat belts, safety helmets, smoke detectors, carbon dioxide detectors, and smoking near bedding or upholstery  Sexual health: discussed sexually transmitted diseases, partner selection, use of condoms, avoidance of unintended pregnancy, and contraceptive alternatives  · Exercise: the importance of regular exercise/physical activity was discussed  Recommend exercise 3-5 times per week for at least 30 minutes  BMI Counseling: Body mass index is 38 66 kg/m²  The BMI is above normal  Nutrition recommendations include decreasing portion sizes, encouraging healthy choices of fruits and vegetables, decreasing fast food intake, consuming healthier snacks and limiting drinks that contain sugar  Return in about 6 months (around 9/5/2021) for Recheck  Chief Complaint:     Chief Complaint   Patient presents with    Abdominal Pain     f/u as per pt would like a referral for GI, also pt states he also wants to do a physical    Back Pain     pt states back pain usually happens when he gets the abdominal pain     Heartburn      History of Present Illness:     Adult Annual Physical   Patient here for a comprehensive physical exam  The patient reports problems - abdominal pain  GERD  Paitent complains of acid reflux  This has been associated with heartburn, midespigastric pain and upper abdominal discomfort  He denies bilious reflux, chest pain, choking on food, cough, deep pressure at base of neck, difficulty swallowing, dysphagia, early satiety, fullness after meals, hematemesis, hoarseness, melena, nausea, need to clear throat frequently, regurgitation of undigested food, shortness of breath and unexpected weight loss  Symptoms have been present for several months  He denies dysphagia   He has not lost weight  He denies melena, hematochezia, hematemesis, and coffee ground emesis  Medical therapy in the past has included H2 antagonists  Diet and Physical Activity  · Diet/Nutrition: high fat diet  · Exercise: no formal exercise  Depression Screening  PHQ-9 Depression Screening    PHQ-9:   Frequency of the following problems over the past two weeks:      Little interest or pleasure in doing things: 1 - several days  Feeling down, depressed, or hopeless: 0 - not at all  PHQ-2 Score: 1       General Health  · Sleep: sleeps well  · Hearing: normal - bilateral   · Vision: no vision problems  · Dental: regular dental visits  [facility]  · History of STDs?: no      Review of Systems:     Review of Systems   Constitutional: Negative for chills and fever  HENT: Negative for ear pain and sore throat  Eyes: Negative for pain and visual disturbance  Respiratory: Negative for cough and shortness of breath  Cardiovascular: Negative for chest pain and palpitations  Gastrointestinal: Positive for abdominal pain  Negative for blood in stool, constipation, diarrhea, nausea and vomiting  Genitourinary: Negative for difficulty urinating, dysuria, flank pain and hematuria  Musculoskeletal: Negative for arthralgias and back pain  Skin: Negative for color change and rash  Neurological: Negative for dizziness, seizures and syncope  All other systems reviewed and are negative       Past Medical History:     Past Medical History:   Diagnosis Date    Abscess     Allergic     Asthma     Diverticulitis     Diverticulitis of colon     Gastroesophageal reflux disease 4/9/2020    MRSA infection     of an abscess    Obesity     SIRS (systemic inflammatory response syndrome) ([facility] [address] )       Past Surgical History:     Past Surgical History:   Procedure Laterality Date    FRACTURE SURGERY      INCISION AND DRAINAGE OF WOUND Left 12/23/2016    Groin    MANDIBLE FRACTURE SURGERY        Social History: E-Cigarette/Vaping    E-Cigarette Use Never User      E-Cigarette/Vaping Substances    Nicotine No     THC No     CBD No     Flavoring No     Other No     Unknown No      Social History     Socioeconomic History    Marital status: Single     Spouse name: None    Number of children: 1    Years of education: None    Highest education level: None   Occupational History    None   Social Needs    Financial resource strain: None    Food insecurity     Worry: None     Inability: None    Transportation needs     Medical: None     Non-medical: None   Tobacco Use    Smoking status: Current Every Day Smoker     Packs/day: 0 50     Years: 15 00     Pack years: 7 50     Types: Cigarettes    Smokeless tobacco: Never Used   Substance and Sexual Activity    Alcohol use: Not Currently     Frequency: Never    Drug use: Yes     Frequency: 7 0 times per week     Types: Marijuana     Comment: multiple times daily     Sexual activity: Not Currently     Partners: Female   Lifestyle    Physical activity     Days per week: None     Minutes per session: None    Stress: None   Relationships    Social connections     Talks on phone: None     Gets together: None     Attends [employer] service: None     Active member of club or organization: None     Attends meetings of clubs or organizations: None     Relationship status: None    Intimate partner violence     Fear of current or ex partner: None     Emotionally abused: None     Physically abused: None     Forced sexual activity: None   Other Topics Concern    None   Social History Narrative    Lives with domestic partner    Sedentary lifestyle        patient not sexually active for past year with his wife       Family History:     Family History   Problem Relation Age of Onset    Diabetes Maternal Aunt     Asthma Mother     Depression Mother     No Known Problems Sister     No Known Problems Brother     No Known Problems Sister     No Known Problems Sister    [place] No Known Problems Brother     No Known Problems Brother     No Known Problems Brother     No Known Problems Brother     No Known Problems Brother       Current Medications:     Current Outpatient Medications   Medication Sig Dispense Refill    albuterol (Ventolin HFA) 90 mcg/act inhaler Inhale 2 puffs every 6 (six) hours as needed for wheezing or shortness of breath 18 g 0    pantoprazole (PROTONIX) 40 mg tablet Take 1 tablet (40 mg total) by mouth daily 90 tablet 1     No current facility-administered medications for this visit  Allergies:     No Known Allergies   Physical Exam:     /86 (BP Location: Left arm, Patient Position: Sitting, Cuff Size: Adult)   Pulse 98   Temp 98 7 °F (37 1 °C) (Temporal)   Resp 20   Ht 5' 11" (1 803 m)   Wt 126 kg (277 lb 3 2 oz)   SpO2 96%   BMI 38 66 kg/m²     Physical Exam  Vitals signs and nursing note reviewed  Constitutional:       Appearance: He is well-developed  He is obese  HENT:      Head: Normocephalic and atraumatic  Right Ear: Tympanic membrane and external ear normal       Left Ear: Tympanic membrane and external ear normal       Nose: Nose normal       Mouth/Throat:      Mouth: Mucous membranes are moist    Eyes:      General:         Right eye: No discharge  Left eye: No discharge  Extraocular Movements: Extraocular movements intact  Conjunctiva/sclera: Conjunctivae normal       Pupils: Pupils are equal, round, and reactive to light  Neck:      Musculoskeletal: Normal range of motion and neck supple  Cardiovascular:      Rate and Rhythm: Normal rate and regular rhythm  Pulses: Normal pulses  Heart sounds: Normal heart sounds  No murmur  Pulmonary:      Effort: Pulmonary effort is normal  No respiratory distress  Breath sounds: Normal breath sounds  Abdominal:      General: Bowel sounds are normal  There is no distension  Palpations: Abdomen is soft  Tenderness:  There is no abdominal tenderness  There is no guarding  Musculoskeletal: Normal range of motion  Right lower leg: No edema  Left lower leg: No edema  Lymphadenopathy:      Cervical: No cervical adenopathy  Skin:     General: Skin is warm and dry  Capillary Refill: Capillary refill takes less than 2 seconds  Neurological:      General: No focal deficit present  Mental Status: He is alert and oriented to person, place, and time     Psychiatric:         Mood and Affect: Mood normal          Behavior: Behavior normal           Shayan Zamorano 32

## 2021-03-05 NOTE — PATIENT INSTRUCTIONS
Heart Healthy Diet   WHAT YOU NEED TO KNOW:   A heart healthy diet is an eating plan low in unhealthy fats and sodium (salt)  The plan is high in healthy fats and fiber  A heart healthy diet helps improve your cholesterol levels and lowers your risk for heart disease and stroke  A dietitian will teach you how to read and understand food labels  DISCHARGE INSTRUCTIONS:   Heart healthy diet guidelines to follow:   · Choose foods that contain healthy fats  ? Unsaturated fats  include monounsaturated and polyunsaturated fats  Unsaturated fat is found in foods such as soybean, canola, olive, corn, and safflower oils  It is also found in soft tub margarine that is made with liquid vegetable oil  ? Omega-3 fat  is found in certain fish, such as salmon, tuna, and trout, and in walnuts and flaxseed  Eat fish high in omega-3 fats at least 2 times a week  · Get 20 to 30 grams of fiber each day  Fruits, vegetables, whole-grain foods, and legumes (cooked beans) are good sources of fiber  · Limit or do not have unhealthy fats  ? Cholesterol  is found in animal foods, such as eggs and lobster, and in dairy products made from whole milk  Limit cholesterol to less than 200 mg each day  ? Saturated fat  is found in meats, such as andino and hamburger  It is also found in chicken or turkey skin, whole milk, and butter  Limit saturated fat to less than 7% of your total daily calories  ? Trans fat  is found in packaged foods, such as potato chips and cookies  It is also in hard margarine, some fried foods, and shortening  Do not eat foods that contain trans fats  · Limit sodium as directed  You may be told to limit sodium to 2,000 to 2,300 mg each day  Choose low-sodium or no-salt-added foods  Add little or no salt to food you prepare  Use herbs and spices in place of salt         Include the following in your heart healthy plan:  Ask your dietitian or healthcare provider how many servings to have from each of the following food groups:  · Grains:      ? Whole-wheat breads, cereals, and pastas, and brown rice    ? Low-fat, low-sodium crackers and chips    · Vegetables:      ? Broccoli, green beans, green peas, and spinach    ? Collards, kale, and lima beans    ? Carrots, sweet potatoes, tomatoes, and peppers    ? Canned vegetables with no salt added    · Fruits:      ? Bananas, peaches, pears, and pineapple    ? Grapes, raisins, and dates    ? Oranges, tangerines, grapefruit, orange juice, and grapefruit juice    ? Apricots, mangoes, melons, and papaya    ? Raspberries and strawberries    ? Canned fruit with no added sugar    · Low-fat dairy:      ? Nonfat (skim) milk, 1% milk, and low-fat almond, cashew, or soy milks fortified with calcium    ? Low-fat cheese, regular or frozen yogurt, and cottage cheese    · Meats and proteins:      ? Lean cuts of beef and pork (loin, leg, round), skinless chicken and turkey    ? Legumes, soy products, egg whites, or nuts    Limit or do not include the following in your heart healthy plan:   · Unhealthy fats and oils:      ? Whole or 2% milk, cream cheese, sour cream, or cheese    ? High-fat cuts of beef (T-bone steaks, ribs), chicken or turkey with skin, and organ meats such as liver    ? Butter, stick margarine, shortening, and cooking oils such as coconut or palm oil    · Foods and liquids high in sodium:      ? Packaged foods, such as frozen dinners, cookies, macaroni and cheese, and cereals with more than 300 mg of sodium per serving    ? Vegetables with added sodium, such as instant potatoes, vegetables with added sauces, or regular canned vegetables    ? Cured or smoked meats, such as hot dogs, andino, and sausage    ? High-sodium ketchup, barbecue sauce, salad dressing, pickles, olives, soy sauce, or miso    · Foods and liquids high in sugar:      ? Candy, cake, cookies, pies, or doughnuts    ? Soft drinks (soda), sports drinks, or sweetened tea    ?  Canned or dry mixes for cakes, soups, sauces, or gravies    Other healthy heart guidelines:   · Do not smoke  Nicotine and other chemicals in cigarettes and cigars can cause lung and heart damage  Ask your healthcare provider for information if you currently smoke and need help to quit  E-cigarettes or smokeless tobacco still contain nicotine  Talk to your healthcare provider before you use these products  · Limit or do not drink alcohol as directed  Alcohol can damage your heart and raise your blood pressure  Your healthcare provider may give you specific daily and weekly limits  The general recommended limit is 1 drink a day for women 21 or older and for men 72 or older  Do not have more than 3 drinks in a day or 7 in a week  The recommended limit is 2 drinks a day for men 24to 59years of age  Do not have more than 4 drinks in a day or 14 in a week  A drink of alcohol is 12 ounces of beer, 5 ounces of wine, or 1½ ounces of liquor  · Exercise regularly  Exercise can help you maintain a healthy weight and improve your blood pressure and cholesterol levels  Regular exercise can also decrease your risk for heart problems  Ask your healthcare provider about the best exercise plan for you  Do not start an exercise program without asking your healthcare provider  Follow up with your doctor or cardiologist as directed:  Write down your questions so you remember to ask them during your visits  © Copyright [address] Information is for End User's use only and may not be sold, redistributed or otherwise used for commercial purposes  All illustrations and images included in CareNotes® are the copyrighted property of [employer] D [employer] M , Inc  or [address] [place] [address]  The above information is an  only  It is not intended as medical advice for individual conditions or treatments   Talk to your doctor, nurse or pharmacist before following any medical regimen to see if it is safe and effective for you   Gastroesophageal Reflux Disease   WHAT YOU NEED TO KNOW:   Gastroesophageal reflux disease (GERD) is reflux that occurs more than twice a week for a few weeks  Reflux means acid and food in the stomach back up into the esophagus  It usually causes heartburn and other symptoms  GERD can cause other health problems over time if it is not treated  DISCHARGE INSTRUCTIONS:   Call your local emergency number (911 in the [facility]) if:   · You have severe chest pain and sudden trouble breathing  Return to the emergency department if:   · You have trouble breathing after you vomit  · You have trouble swallowing, or pain with swallowing  · Your bowel movements are black, bloody, or tarry-looking  · Your vomit looks like coffee grounds or has blood in it  Call your doctor or gastroenterologist if:   · You feel full and cannot burp or vomit  · You vomit large amounts, or you vomit often  · You are losing weight without trying  · Your symptoms get worse or do not improve with treatment  · You have questions or concerns about your condition or care  Medicines:   · Medicines  are used to decrease stomach acid  Medicine may also be used to help your lower esophageal sphincter and stomach contract (tighten) more  · Take your medicine as directed  Contact your healthcare provider if you think your medicine is not helping or if you have side effects  Tell him of her if you are allergic to any medicine  Keep a list of the medicines, vitamins, and herbs you take  Include the amounts, and when and why you take them  Bring the list or the pill bottles to follow-up visits  Carry your medicine list with you in case of an emergency  Manage GERD:   · Do not have foods or drinks that may increase heartburn  These include chocolate, peppermint, fried or fatty foods, drinks that contain caffeine, or carbonated drinks (soda)  Other foods include spicy foods, onions, tomatoes, and tomato-based foods   Do not have foods or drinks that can irritate your esophagus, such as citrus fruits, juices, and alcohol  · Do not eat large meals  When you eat a lot of food at one time, your stomach needs more acid to digest it  Eat 6 small meals each day instead of 3 large ones, and eat slowly  Do not eat meals 2 to 3 hours before bedtime  · Elevate the head of your bed  Place 6-inch blocks under the head of your bed frame  You may also use more than one pillow under your head and shoulders while you sleep  · Maintain a healthy weight  If you are overweight, weight loss may help relieve symptoms of GERD  · Do not smoke  Smoking weakens the lower esophageal sphincter and increases the risk of GERD  Ask your healthcare provider for information if you currently smoke and need help to quit  E-cigarettes or smokeless tobacco still contain nicotine  Talk to your healthcare provider before you use these products  · Do not wear clothing that is tight around your waist   Tight clothing can put pressure on your stomach and cause or worsen GERD symptoms  Follow up with your doctor or gastroenterologist as directed:  Write down your questions so you remember to ask them during your visits  © Copyright [address] [place] [address] Drive Information is for End User's use only and may not be sold, redistributed or otherwise used for commercial purposes  All illustrations and images included in CareNotes® are the copyrighted property of [employer] M [employer]  or [facility] [employer]   The above information is an  only  It is not intended as medical advice for individual conditions or treatments  Talk to your doctor, nurse or pharmacist before following any medical regimen to see if it is safe and effective for you  Wellness Visit for Adults   AMBULATORY CARE:   A wellness visit  is when you see your healthcare provider to get screened for health problems  Your healthcare provider will also give you advice on how to stay healthy  Write down your questions so you remember to ask them  Ask your healthcare provider how often you should have a wellness visit  What happens at a wellness visit:  Your healthcare provider will ask about your health, and your family history of health problems  This includes high blood pressure, heart disease, and cancer  He or she will ask if you have symptoms that concern you, if you smoke, and about your mood  You may also be asked about your intake of medicines, supplements, food, and alcohol  Any of the following may be done:  · Your weight  will be checked  Your height may also be checked so your body mass index (BMI) can be calculated  Your BMI shows if you are at a healthy weight  · Your blood pressure  and heart rate will be checked  Your temperature may also be checked  · Blood and urine tests  may be done  Blood tests may be done to check your cholesterol levels  Abnormal cholesterol levels increase your risk for heart disease and stroke  You may also need a blood or urine test to check for diabetes if you are at increased risk  Urine tests may be done to look for signs of an infection or kidney disease  · A physical exam  includes checking your heartbeat and lungs with a stethoscope  Your healthcare provider may also check your skin to look for sun damage  · Screening tests  may be recommended  A screening test is done to check for diseases that may not cause symptoms  The screening tests you may need depend on your age, gender, family history, and lifestyle habits  For example, colorectal screening may be recommended if you are 48years old or older  Screening tests you need if you are a woman:   · A Pap smear  is used to screen for cervical cancer  Pap smears are usually done every 3 to 5 years depending on your age  You may need them more often if you have had abnormal Pap smear test results in the past  Ask your healthcare provider how often you should have a Pap smear      · A mammogram is an x-ray of your breasts to screen for breast cancer  Experts recommend mammograms every 2 years starting at age 48 years  You may need a mammogram at age 52 years or younger if you have an increased risk for breast cancer  Talk to your healthcare provider about when you should start having mammograms and how often you need them  Vaccines you may need:   · Get an influenza vaccine  every year  The influenza vaccine protects you from the flu  Several types of viruses cause the flu  The viruses change over time, so new vaccines are made each year  · Get a tetanus-diphtheria (Td) booster vaccine  every 10 years  This vaccine protects you against tetanus and diphtheria  Tetanus is a severe infection that may cause painful muscle spasms and lockjaw  Diphtheria is a severe bacterial infection that causes a thick covering in the back of your mouth and throat  · Get a human papillomavirus (HPV) vaccine  if you are female and aged 23 to 32 or male 23 to 24 and never received it  This vaccine protects you from HPV infection  HPV is the most common infection spread by sexual contact  HPV may also cause vaginal, penile, and anal cancers  · Get a pneumococcal vaccine  if you are aged 72 years or older  The pneumococcal vaccine is an injection given to protect you from pneumococcal disease  Pneumococcal disease is an infection caused by pneumococcal bacteria  The infection may cause pneumonia, meningitis, or an ear infection  · Get a shingles vaccine  if you are 60 or older, even if you have had shingles before  The shingles vaccine is an injection to protect you from the varicella-zoster virus  This is the same virus that causes chickenpox  Shingles is a painful rash that develops in people who had chickenpox or have been exposed to the virus  How to eat healthy:  My Plate is a model for planning healthy meals  It shows the types and amounts of foods that should go on your plate   Fruits and vegetables make up about half of your plate, and grains and protein make up the other half  A serving of dairy is included on the side of your plate  The amount of calories and serving sizes you need depends on your age, gender, weight, and height  Examples of healthy foods are listed below:  · Eat a variety of vegetables  such as dark green, red, and orange vegetables  You can also include canned vegetables low in sodium (salt) and frozen vegetables without added butter or sauces  · Eat a variety of fresh fruits , canned fruit in 100% juice, frozen fruit, and dried fruit  · Include whole grains  At least half of the grains you eat should be whole grains  Examples include whole-wheat bread, wheat pasta, brown rice, and whole-grain cereals such as oatmeal     · Eat a variety of protein foods such as seafood (fish and shellfish), lean meat, and poultry without skin (turkey and chicken)  Examples of lean meats include pork leg, shoulder, or tenderloin, and beef round, sirloin, tenderloin, and extra lean ground beef  Other protein foods include eggs and egg substitutes, beans, peas, soy products, nuts, and seeds  · Choose low-fat dairy products such as skim or 1% milk or low-fat yogurt, cheese, and cottage cheese  · Limit unhealthy fats  such as butter, hard margarine, and shortening  Exercise:  Exercise at least 30 minutes per day on most days of the week  Some examples of exercise include walking, biking, dancing, and swimming  You can also fit in more physical activity by taking the stairs instead of the elevator or parking farther away from stores  Include muscle strengthening activities 2 days each week  Regular exercise provides many health benefits  It helps you manage your weight, and decreases your risk for type 2 diabetes, heart disease, stroke, and high blood pressure  Exercise can also help improve your mood  Ask your healthcare provider about the best exercise plan for you       General health and safety guidelines:   · Do not smoke  Nicotine and other chemicals in cigarettes and cigars can cause lung damage  Ask your healthcare provider for information if you currently smoke and need help to quit  E-cigarettes or smokeless tobacco still contain nicotine  Talk to your healthcare provider before you use these products  · Limit alcohol  A drink of alcohol is 12 ounces of beer, 5 ounces of wine, or 1½ ounces of liquor  · Lose weight, if needed  Being overweight increases your risk of certain health conditions  These include heart disease, high blood pressure, type 2 diabetes, and certain types of cancer  · Protect your skin  Do not sunbathe or use tanning beds  Use sunscreen with a SPF 15 or higher  Apply sunscreen at least 15 minutes before you go outside  Reapply sunscreen every 2 hours  Wear protective clothing, hats, and sunglasses when you are outside  · Drive safely  Always wear your seatbelt  Make sure everyone in your car wears a seatbelt  A seatbelt can save your life if you are in an accident  Do not use your cell phone when you are driving  This could distract you and cause an accident  Pull over if you need to make a call or send a text message  · Practice safe sex  Use latex condoms if are sexually active and have more than one partner  Your healthcare provider may recommend screening tests for sexually transmitted infections (STIs)  · Wear helmets, lifejackets, and protective gear  Always wear a helmet when you ride a bike or motorcycle, go skiing, or play sports that could cause a head injury  Wear protective equipment when you play sports  Wear a lifejacket when you are on a boat or doing water sports  © Copyright [address] Information is for End User's use only and may not be sold, redistributed or otherwise used for commercial purposes   All illustrations and images included in CareNotes® are the copyrighted property of [employer] M [employer] Inc  or Lefty Valero above information is an  only  It is not intended as medical advice for individual conditions or treatments  Talk to your doctor, nurse or pharmacist before following any medical regimen to see if it is safe and effective for you

## 2021-07-02 ENCOUNTER — HOSPITAL ENCOUNTER (EMERGENCY)
Facility: HOSPITAL | Age: 36
Discharge: HOME | End: 2021-07-02
Attending: EMERGENCY MEDICINE
Payer: COMMERCIAL

## 2021-07-02 VITALS
BODY MASS INDEX: 39.05 KG/M2 | WEIGHT: 279.98 LBS | SYSTOLIC BLOOD PRESSURE: 133 MMHG | DIASTOLIC BLOOD PRESSURE: 75 MMHG | HEART RATE: 93 BPM | TEMPERATURE: 97.9 F | RESPIRATION RATE: 18 BRPM | OXYGEN SATURATION: 99 %

## 2021-07-02 DIAGNOSIS — L02.11 ABSCESS OF NECK: Primary | ICD-10-CM

## 2021-07-02 PROCEDURE — 99284 EMERGENCY DEPT VISIT MOD MDM: CPT | Performed by: PHYSICIAN ASSISTANT

## 2021-07-02 PROCEDURE — 99283 EMERGENCY DEPT VISIT LOW MDM: CPT

## 2021-07-02 PROCEDURE — 10061 I&D ABSCESS COMP/MULTIPLE: CPT | Performed by: PHYSICIAN ASSISTANT

## 2021-07-02 RX ORDER — CEPHALEXIN 250 MG/1
500 CAPSULE ORAL EVERY 6 HOURS SCHEDULED
Qty: 56 CAPSULE | Refills: 0 | Status: SHIPPED | OUTPATIENT
Start: 2021-07-02 | End: 2021-07-09

## 2021-07-02 RX ORDER — SULFAMETHOXAZOLE AND TRIMETHOPRIM 800; 160 MG/1; MG/1
1 TABLET ORAL 2 TIMES DAILY
Qty: 14 TABLET | Refills: 0 | Status: SHIPPED | OUTPATIENT
Start: 2021-07-02 | End: 2021-07-09

## 2021-07-02 RX ORDER — LIDOCAINE HYDROCHLORIDE AND EPINEPHRINE BITARTRATE 20; .01 MG/ML; MG/ML
5 INJECTION, SOLUTION SUBCUTANEOUS ONCE
Status: COMPLETED | OUTPATIENT
Start: 2021-07-02 | End: 2021-07-02

## 2021-07-02 RX ADMIN — LIDOCAINE HYDROCHLORIDE AND EPINEPHRINE 5 ML: 20; 10 INJECTION, SOLUTION INFILTRATION; PERINEURAL at 11:12

## 2021-07-02 NOTE — DISCHARGE INSTRUCTIONS
Follow up in 2 days for packing/wound check  Return to Er if worsening pain, swelling, redness, development of fevers, chills, nausea, body achre  Take all antibiotics as prescribed  Follow up with PCP as needed  General surgery follow up if symptoms persist

## 2021-07-02 NOTE — ED PROVIDER NOTES
History  Chief Complaint   Patient presents with    Neck Pain     started thursday     35 y o  male presents for evaluation of abscess on right neck, states symptoms began several days ago  Today woke up was larger, more red, softer and more painful  "I was waiting for it to pop like the others"  Denies fevers, chills, N/V/D, SOB, CP, difficulty swallowing, difficulty talking, drainage, change to voice  Prior to Admission Medications   Prescriptions Last Dose Informant Patient Reported? Taking? albuterol (Ventolin HFA) 90 mcg/act inhaler Past Month at Unknown time  No Yes   Sig: Inhale 2 puffs every 6 (six) hours as needed for wheezing or shortness of breath   pantoprazole (PROTONIX) 40 mg tablet   No No   Sig: Take 1 tablet (40 mg total) by mouth daily      Facility-Administered Medications: None       Past Medical History:   Diagnosis Date    Abscess     Allergic     Asthma     Diverticulitis     Diverticulitis of colon     Gastroesophageal reflux disease 4/9/2020    MRSA infection     of an abscess    Obesity     SIRS (systemic inflammatory response syndrome) (HCC)        Past Surgical History:   Procedure Laterality Date    FRACTURE SURGERY      INCISION AND DRAINAGE OF WOUND Left 12/23/2016    Groin    MANDIBLE FRACTURE SURGERY         Family History   Problem Relation Age of Onset    Diabetes Maternal Aunt     Asthma Mother     Depression Mother     No Known Problems Sister     No Known Problems Brother     No Known Problems Sister     No Known Problems Sister     No Known Problems Brother     No Known Problems Brother     No Known Problems Brother     No Known Problems Brother     No Known Problems Brother      I have reviewed and agree with the history as documented      E-Cigarette/Vaping    E-Cigarette Use Never User      E-Cigarette/Vaping Substances    Nicotine No     THC No     CBD No     Flavoring No     Other No     Unknown No      Social History     Tobacco Use    Smoking status: Current Every Day Smoker     Packs/day: 0 50     Years: 15 00     Pack years: 7 50     Types: Cigarettes    Smokeless tobacco: Never Used   Vaping Use    Vaping Use: Never used   Substance Use Topics    Alcohol use: Not Currently    Drug use: Yes     Frequency: 7 0 times per week     Types: Marijuana     Comment: multiple times daily        Review of Systems   Skin: Positive for wound  All other systems reviewed and are negative  Physical Exam  Physical Exam  Vitals and nursing note reviewed  Constitutional:       Appearance: Normal appearance  He is normal weight  HENT:      Head: Normocephalic and atraumatic  Right Ear: External ear normal       Left Ear: External ear normal       Nose: Nose normal       Mouth/Throat:      Mouth: Mucous membranes are moist       Pharynx: Oropharynx is clear  Eyes:      Extraocular Movements: Extraocular movements intact  Conjunctiva/sclera: Conjunctivae normal    Neck:      Trachea: Trachea and phonation normal      Cardiovascular:      Rate and Rhythm: Normal rate and regular rhythm  Pulses: Normal pulses  Pulmonary:      Effort: Pulmonary effort is normal       Breath sounds: Normal breath sounds  Abdominal:      Palpations: Abdomen is soft  Musculoskeletal:         General: Normal range of motion  Cervical back: Normal range of motion and neck supple  Erythema present  No edema, signs of trauma, rigidity, torticollis or crepitus  No pain with movement, spinous process tenderness or muscular tenderness  Normal range of motion  Skin:     General: Skin is warm  Capillary Refill: Capillary refill takes less than 2 seconds  Findings: Abscess present  Neurological:      General: No focal deficit present  Mental Status: He is alert and oriented to person, place, and time  Mental status is at baseline     Psychiatric:         Mood and Affect: Mood normal          Behavior: Behavior normal  Thought Content: Thought content normal          Judgment: Judgment normal          Vital Signs  ED Triage Vitals   Temperature Pulse Respirations Blood Pressure SpO2   07/02/21 0933 07/02/21 0931 07/02/21 0931 07/02/21 0931 07/02/21 0931   97 9 °F (36 6 °C) 93 18 133/75 99 %      Temp Source Heart Rate Source Patient Position - Orthostatic VS BP Location FiO2 (%)   07/02/21 0933 07/02/21 0931 -- -- --   Oral Monitor         Pain Score       --                  Vitals:    07/02/21 0931   BP: 133/75   Pulse: 93         Visual Acuity      ED Medications  Medications   lidocaine-epinephrine (XYLOCAINE/EPINEPHRINE) 2 %-1:100,000 injection 5 mL (5 mL Infiltration Given 7/2/21 1112)       Diagnostic Studies  Results Reviewed     None                 No orders to display              Procedures  Incision and drain    Date/Time: 7/2/2021 11:33 AM  Performed by: Scot Lord PA-C  Authorized by: Scot Lord PA-C   Universal Protocol:  Consent: Verbal consent obtained  Risks and benefits: risks, benefits and alternatives were discussed  Consent given by: patient  Patient identity confirmed: verbally with patient      Patient location:  ED  Location:     Type:  Abscess    Size:  3    Location:  Head/neck    Head/neck location:  Neck  Pre-procedure details:     Skin preparation:  Antiseptic wash  Procedure details:     Complexity:  Simple    Needle aspiration: no      Incision types:  Stab incision    Scalpel blade:  11    Approach:  Open    Incision depth:  Subcutaneous    Wound management:  Probed and deloculated, irrigated with saline and extensive cleaning    Drainage:  Bloody and purulent    Drainage amount: Moderate    Wound treatment:  Packing placed    Packing materials:  1/4 in gauze  Post-procedure details:     Patient tolerance of procedure:   Tolerated well, no immediate complications             ED Course                                           MDM  Number of Diagnoses or Management Options  Abscess of neck  Diagnosis management comments: See additional I&D note,   Given pt history of recurrent abscess will cover with Keflex and Bactrim  Strict return precautions discussed and need for follow up and packing in 2 days  Pt verbalized understanding will DC      Disposition  Final diagnoses:   Abscess of neck     Time reflects when diagnosis was documented in both MDM as applicable and the Disposition within this note     Time User Action Codes Description Comment    7/2/2021 11:52 AM Ingrid Millan Add [L02 11] Abscess of neck       ED Disposition     ED Disposition Condition Date/Time Comment    Discharge Stable Fri Jul 2, 2021 11:52 AM Macy Torres discharge to home/self care              Follow-up Information     Follow up With Specialties Details Why Contact Info Additional [facility] 26, [address], Nurse Practitioner   [facility]  [address] [facility]  [place] [address]  [address]        [address] [facility] General Surgery   [address] [place] [id number] [phone] General Surgical Care [address] [place] [address]          Discharge Medication List as of 7/2/2021 11:55 AM      START taking these medications    Details   cephalexin (KEFLEX) 250 mg capsule Take 2 capsules (500 mg total) by mouth every 6 (six) hours for 7 days, Starting Fri 7/2/2021, Until Fri 7/9/2021, Normal      sulfamethoxazole-trimethoprim (BACTRIM DS) 800-160 mg per tablet Take 1 tablet by mouth 2 (two) times a day for 7 days smx-tmp DS (BACTRIM) 800-160 mg tabs (1tab q12 D10), Starting Fri 7/2/2021, Until Fri 7/9/2021, Normal         CONTINUE these medications which have NOT CHANGED    Details   albuterol (Ventolin HFA) 90 mcg/act inhaler Inhale 2 puffs every 6 (six) hours as needed for wheezing or shortness of breath, Starting Fri 3/5/2021, Normal      pantoprazole (PROTONIX) 40 mg tablet Take 1 tablet (40 mg total) by mouth daily, Starting Fri 3/5/2021, Normal           No discharge procedures on file      PDMP Review     None          ED Provider  Electronically Signed by           Yas Moctezuma PA-C  07/06/21 8532

## 2021-07-02 NOTE — ED ATTENDING ATTESTATION
Valdez Hu DO, saw and evaluated the patient  I have discussed the patient with the resident/non-physician practitioner and agree with the resident's/non-physician practitioner's findings, Plan of Care, and MDM as documented in the resident's/non-physician practitioner's note, except where noted  All available labs and Radiology studies were reviewed  At this point I agree with the current assessment done in the Emergency Department  I have conducted an independent evaluation of this patient including a focused history and a physical exam     ED Note - Ever Comment 38 y o  male MRN: [id number]  Unit/Bed#: ED 29 Encounter: [id number]    History of Present Illness   HPI  Ever Comment is a 38 y o  male who presents for evaluation of right neck erythematous mass suspicious for an abscess  No fever or chills  Patient with history of multiple abscesses  Well-appearing and not in any distress  REVIEW OF SYSTEMS  See HPI for further details  12 systems reviewed and otherwise negative except as noted     Historical Information     PAST MEDICAL HISTORY  Past Medical History:   Diagnosis Date    Abscess     Allergic     Asthma     Diverticulitis     Diverticulitis of colon     Gastroesophageal reflux disease 4/9/2020    MRSA infection     of an abscess    Obesity     SIRS (systemic inflammatory response syndrome) ([facility] [address] )        FAMILY HISTORY  Family History   Problem Relation Age of Onset    Diabetes Maternal Aunt     Asthma Mother     Depression Mother     No Known Problems Sister     No Known Problems Brother     No Known Problems Sister     No Known Problems Sister     No Known Problems Brother     No Known Problems Brother     No Known Problems Brother     No Known Problems Brother     No Known Problems Brother        SOCIAL HISTORY  Social History     Socioeconomic History    Marital status: Single     Spouse name: Not on file    Number of children: 1    Years of education: Not on file    Highest education level: Not on file   Occupational History    Not on file   Tobacco Use    Smoking status: Current Every Day Smoker     Packs/day: 0 50     Years: 15 00     Pack years: 7 50     Types: Cigarettes    Smokeless tobacco: Never Used   Vaping Use    Vaping Use: Never used   Substance and Sexual Activity    Alcohol use: Not Currently    Drug use: Yes     Frequency: 7 0 times per week     Types: Marijuana     Comment: multiple times daily     Sexual activity: Not Currently     Partners: Female   Other Topics Concern    Not on file   Social History Narrative    Lives with domestic partner    Sedentary lifestyle        patient not sexually active for past year with his wife      Social Determinants of Health     Financial Resource Strain:     Difficulty of Paying Living Expenses:    Food Insecurity:     Worried About Running Out of Food in the Last Year:     Ran Out of Food in the Last Year:    Transportation Needs:     Lack of Transportation (Medical):  Lack of Transportation (Non-Medical):    Physical Activity:     Days of Exercise per Week:     Minutes of Exercise per Session:    Stress:     Feeling of Stress :    Social Connections:     Frequency of Communication with Friends and Family:     Frequency of Social Gatherings with Friends and Family:     Attends Yazidism Services:     Active Member of Clubs or Organizations:     Attends Club or Organization Meetings:     Marital Status:    Intimate Partner Violence:     Fear of Current or Ex-Partner:     Emotionally Abused:     Physically Abused:     Sexually Abused:        SURGICAL HISTORY  Past Surgical History:   Procedure Laterality Date    FRACTURE SURGERY      INCISION AND DRAINAGE OF WOUND Left 12/23/2016    Groin    MANDIBLE FRACTURE SURGERY       Meds/Allergies     CURRENT MEDICATIONS  No current facility-administered medications for this encounter      Current Outpatient Medications:    albuterol (Ventolin HFA) 90 mcg/act inhaler, Inhale 2 puffs every 6 (six) hours as needed for wheezing or shortness of breath, Disp: 18 g, Rfl: 0    pantoprazole (PROTONIX) 40 mg tablet, Take 1 tablet (40 mg total) by mouth daily, Disp: 90 tablet, Rfl: 1  (Not in a hospital admission)      ALLERGIES  No Known Allergies  Objective     PHYSICAL EXAM    VITAL SIGNS: Blood pressure 133/75, pulse 93, temperature 97 9 °F (36 6 °C), temperature source Oral, resp  rate 18, weight 127 kg (279 lb 15 8 oz), SpO2 99 %  Constitutional:  Appears well developed and well nourished, no acute distress, non-toxic appearance   Eyes:  PERRL, EOMI, conjunctivae pink, sclerae non-icteric    HENT:  Normocephalic/Atraumatic, no rhinorrhea, mucous membranes moist   Neck: normal range of motion, no tenderness, supple  Approximately 6 cm long and 1 cm deep elongated erythematous raised fluctuant mass noted to the right neck at the neck and shoulder junction   Respiratory:  No respiratory distress, normal breath sounds   Cardiovascular:  Normal rate, normal rhythm    GI:  Soft, no tenderness, non-distended  Musculoskeletal:  No swelling or edema, no tenderness, no deformities  Integument:  Pink, warm, dry, Well hydrated, no rash, no erythema, no bullae   Lymphatic:  No cervical/ tonsillar/ submandibular lymphadenopathy noted   Neurologic:  Awake, Alert & oriented x 3, CN 2-12 intact, no focal neurological deficits, motor function intact  Psychiatric:  Speech and behavior appropriate         Bedside ultrasound shows the mass to be approximately 1 cm deep with no obvious vascular involvement  ED COURSE and MDM:    Assessment/Plan   Assessment:  Franco Woodward is a 38 y o  male presents for evaluation of right neck mass, concerning for abscess  Plan:  Ultrasound, symptom management, I and D, antibiotics, disposition as appropriate    Patient will be returning to [place] for approximately 2 weeks as instructed in return precautions  CRITICAL CARE TIME:   0      Portions of the record may have been created with voice recognition software  Occasional wrong word or "sound a like" substitutions may have occurred due to the inherent limitations of voice recognition software       ED Provider  Electronically Signed by

## 2021-08-18 ENCOUNTER — HOSPITAL ENCOUNTER (EMERGENCY)
Facility: HOSPITAL | Age: 36
Discharge: HOME | End: 2021-08-18
Attending: EMERGENCY MEDICINE | Admitting: EMERGENCY MEDICINE
Payer: COMMERCIAL

## 2021-08-18 ENCOUNTER — APPOINTMENT (EMERGENCY)
Dept: RADIOLOGY | Facility: HOSPITAL | Age: 36
End: 2021-08-18
Payer: COMMERCIAL

## 2021-08-18 VITALS
HEART RATE: 98 BPM | OXYGEN SATURATION: 98 % | SYSTOLIC BLOOD PRESSURE: 153 MMHG | RESPIRATION RATE: 18 BRPM | TEMPERATURE: 97.8 F | DIASTOLIC BLOOD PRESSURE: 101 MMHG

## 2021-08-18 DIAGNOSIS — S93.402A SPRAIN OF LEFT ANKLE, UNSPECIFIED LIGAMENT, INITIAL ENCOUNTER: Primary | ICD-10-CM

## 2021-08-18 PROCEDURE — 73610 X-RAY EXAM OF ANKLE: CPT

## 2021-08-18 PROCEDURE — 99284 EMERGENCY DEPT VISIT MOD MDM: CPT | Performed by: EMERGENCY MEDICINE

## 2021-08-18 PROCEDURE — 99283 EMERGENCY DEPT VISIT LOW MDM: CPT

## 2021-08-18 RX ORDER — ACETAMINOPHEN 325 MG/1
650 TABLET ORAL ONCE
Status: COMPLETED | OUTPATIENT
Start: 2021-08-18 | End: 2021-08-18

## 2021-08-18 RX ADMIN — ACETAMINOPHEN 650 MG: 325 TABLET, FILM COATED ORAL at 13:00

## 2021-08-18 NOTE — ED PROVIDER NOTES
History  Chief Complaint   Patient presents with    Ankle Injury     pt was walking off of sidewalk when he rolled his ankle     47-year-old male presents emergency department for acute left ankle pain  1 hour prior to arrival patient was stepping off a sidewalk onto an uneven surface in the street when his left ankle inverted causing him to lose balance and falling forward onto the mabry of a car  Denies head strike  Patient had acute onset of pain in the left ankle with tingling of the toes of the left foot and was unable to bear weight immediately afterward  Patient has taken no interventions  No history of surgery to left ankle  Prior to Admission Medications   Prescriptions Last Dose Informant Patient Reported? Taking?    albuterol (Ventolin HFA) 90 mcg/act inhaler   No No   Sig: Inhale 2 puffs every 6 (six) hours as needed for wheezing or shortness of breath   pantoprazole (PROTONIX) 40 mg tablet 8/18/2021 at Unknown time  No Yes   Sig: Take 1 tablet (40 mg total) by mouth daily      Facility-Administered Medications: None       Past Medical History:   Diagnosis Date    Abscess     Allergic     Asthma     Diverticulitis     Diverticulitis of colon     Gastroesophageal reflux disease 4/9/2020    MRSA infection     of an abscess    Obesity     SIRS (systemic inflammatory response syndrome) (HCC)        Past Surgical History:   Procedure Laterality Date    FRACTURE SURGERY      INCISION AND DRAINAGE OF WOUND Left 12/23/2016    Groin    MANDIBLE FRACTURE SURGERY         Family History   Problem Relation Age of Onset    Diabetes Maternal Aunt     Asthma Mother     Depression Mother     No Known Problems Sister     No Known Problems Brother     No Known Problems Sister     No Known Problems Sister     No Known Problems Brother     No Known Problems Brother     No Known Problems Brother     No Known Problems Brother     No Known Problems Brother      I have reviewed and agree with the history as documented  E-Cigarette/Vaping    E-Cigarette Use Never User      E-Cigarette/Vaping Substances    Nicotine No     THC No     CBD No     Flavoring No     Other No     Unknown No      Social History     Tobacco Use    Smoking status: Current Every Day Smoker     Packs/day: 0 50     Years: 15 00     Pack years: 7 50     Types: Cigarettes    Smokeless tobacco: Never Used   Vaping Use    Vaping Use: Never used   Substance Use Topics    Alcohol use: Not Currently    Drug use: Yes     Frequency: 7 0 times per week     Types: Marijuana     Comment: multiple times daily         Review of Systems   All other systems reviewed and are negative  Physical Exam  ED Triage Vitals   Temperature Pulse Respirations Blood Pressure SpO2   08/18/21 1252 08/18/21 1252 08/18/21 1252 08/18/21 1252 08/18/21 1252   97 8 °F (36 6 °C) 98 18 (!) 153/101 98 %      Temp Source Heart Rate Source Patient Position - Orthostatic VS BP Location FiO2 (%)   08/18/21 1252 08/18/21 1252 -- -- --   Oral Monitor         Pain Score       08/18/21 1300       5             Orthostatic Vital Signs  Vitals:    08/18/21 1252   BP: (!) 153/101   Pulse: 98       Physical Exam  Vitals and nursing note reviewed  Constitutional:       General: He is not in acute distress  Appearance: Normal appearance  He is well-developed  He is not ill-appearing, toxic-appearing or diaphoretic  HENT:      Head: Normocephalic and atraumatic  Right Ear: External ear normal       Left Ear: External ear normal       Nose: Nose normal       Mouth/Throat:      Mouth: Mucous membranes are moist       Pharynx: Oropharynx is clear  Eyes:      General:         Right eye: No discharge  Left eye: No discharge  Conjunctiva/sclera: Conjunctivae normal    Cardiovascular:      Rate and Rhythm: Normal rate and regular rhythm  Heart sounds: No murmur heard       Pulmonary:      Effort: Pulmonary effort is normal  No respiratory distress  Breath sounds: Normal breath sounds  Abdominal:      Palpations: Abdomen is soft  Tenderness: There is no abdominal tenderness  Musculoskeletal:      Cervical back: Normal range of motion and neck supple  No rigidity  Right lower leg: Normal       Left lower leg: Tenderness present  No swelling, deformity or lacerations  No edema  Right ankle: Normal       Left ankle: Swelling present  No deformity, ecchymosis or lacerations  Tenderness present over the ATF ligament  No lateral malleolus, medial malleolus, AITF ligament, base of 5th metatarsal or proximal fibula tenderness  Decreased range of motion  Normal pulse  Left Achilles Tendon: Normal       Comments: Edema over lateral malleolus  Decreased aROM  Tenderness over L high ankle bilaterally and midfoot  Skin:     General: Skin is warm and dry  Capillary Refill: Capillary refill takes less than 2 seconds  Coloration: Skin is not pale  Findings: No bruising, erythema, lesion or rash  Neurological:      Mental Status: He is alert and oriented to person, place, and time  Sensory: No sensory deficit  Motor: No weakness  ED Medications  Medications   acetaminophen (TYLENOL) tablet 650 mg (650 mg Oral Given 8/18/21 1300)       Diagnostic Studies  Results Reviewed     None                 XR ankle 3+ views LEFT   ED Interpretation by Gallo Ann MD (08/18 1351)   No fracture  Final Result by Ariana Monson MD (08/18 3562)      Normal left ankle  Workstation performed: [id number]               Procedures  Procedures      ED Course                                       MDM  Number of Diagnoses or Management Options  Sprain of left ankle, unspecified ligament, initial encounter: new and requires workup  Diagnosis management comments: Impression: L ATFL or CFL sprain vs fracture  L ankle xray  Ice and tylenol for analgesia      Xrays negative for fracture  Plan: aircast, RICE       Amount and/or Complexity of Data Reviewed  Tests in the radiology section of CPT®: ordered  Independent visualization of images, tracings, or specimens: yes    Risk of Complications, Morbidity, and/or Mortality  Presenting problems: low  Diagnostic procedures: minimal  Management options: minimal    Patient Progress  Patient progress: stable      Disposition  Final diagnoses:   Sprain of left ankle, unspecified ligament, initial encounter     Time reflects when diagnosis was documented in both MDM as applicable and the Disposition within this note     Time User Action Codes Description Comment    8/18/2021  1:55 PM Marla Dallas Add [B09 351I] Sprain of left ankle, unspecified ligament, initial encounter       ED Disposition     ED Disposition Condition Date/Time Comment    Discharge Stable Wed Aug 18, 2021  1:55 PM [facility] discharge to home/self care  Follow-up Information    None         Discharge Medication List as of 8/18/2021  1:58 PM      CONTINUE these medications which have NOT CHANGED    Details   pantoprazole (PROTONIX) 40 mg tablet Take 1 tablet (40 mg total) by mouth daily, Starting Fri 3/5/2021, Normal      albuterol (Ventolin HFA) 90 mcg/act inhaler Inhale 2 puffs every 6 (six) hours as needed for wheezing or shortness of breath, Starting Fri 3/5/2021, Normal           No discharge procedures on file  PDMP Review     None           ED Provider  Attending physically available and evaluated Franco Trace  JUAN M managed the patient along with the ED Attending      Electronically Signed by         Kallie Long MD  08/20/21 6380

## 2021-08-18 NOTE — DISCHARGE INSTRUCTIONS
Apply ice 3x per day for 20 minutes each time  Elevate when you can  Sleep with a pillow under your leg  Take ibuprofen (motrin) or naproxen (aleve) as directed on bottle for the next 4 days  When sitting, exercise your ankle by spelling out the ABCs with your big toe

## 2021-09-07 ENCOUNTER — HOSPITAL ENCOUNTER (EMERGENCY)
Facility: HOSPITAL | Age: 36
Discharge: HOME | End: 2021-09-07
Attending: EMERGENCY MEDICINE
Payer: COMMERCIAL

## 2021-09-07 VITALS
BODY MASS INDEX: 39.78 KG/M2 | OXYGEN SATURATION: 98 % | HEART RATE: 105 BPM | SYSTOLIC BLOOD PRESSURE: 134 MMHG | TEMPERATURE: 98.9 F | RESPIRATION RATE: 18 BRPM | DIASTOLIC BLOOD PRESSURE: 82 MMHG | WEIGHT: 284.17 LBS | HEIGHT: 71 IN

## 2021-09-07 DIAGNOSIS — K57.92 ACUTE DIVERTICULITIS: Primary | ICD-10-CM

## 2021-09-07 LAB
BILIRUB UR QL STRIP: NEGATIVE
CLARITY UR: CLEAR
COLOR UR: NORMAL
GLUCOSE UR STRIP-MCNC: NEGATIVE MG/DL
HGB UR QL STRIP.AUTO: NEGATIVE
KETONES UR STRIP-MCNC: NEGATIVE MG/DL
LEUKOCYTE ESTERASE UR QL STRIP: NEGATIVE
NITRITE UR QL STRIP: NEGATIVE
PH UR STRIP.AUTO: 7 [PH] (ref 4.5–8)
PROT UR STRIP-MCNC: NEGATIVE MG/DL
SP GR UR STRIP.AUTO: 1.02 (ref 1–1.03)
UROBILINOGEN UR QL STRIP.AUTO: 0.2 E.U./DL

## 2021-09-07 PROCEDURE — 81003 URINALYSIS AUTO W/O SCOPE: CPT

## 2021-09-07 PROCEDURE — 99284 EMERGENCY DEPT VISIT MOD MDM: CPT | Performed by: EMERGENCY MEDICINE

## 2021-09-07 PROCEDURE — 99284 EMERGENCY DEPT VISIT MOD MDM: CPT

## 2021-09-07 RX ORDER — METRONIDAZOLE 500 MG/1
500 TABLET ORAL EVERY 8 HOURS SCHEDULED
Qty: 30 TABLET | Refills: 0 | Status: SHIPPED | OUTPATIENT
Start: 2021-09-07 | End: 2021-09-17

## 2021-09-07 RX ORDER — CIPROFLOXACIN 500 MG/1
500 TABLET, FILM COATED ORAL 2 TIMES DAILY
Qty: 20 TABLET | Refills: 0 | Status: SHIPPED | OUTPATIENT
Start: 2021-09-07 | End: 2021-09-17

## 2021-09-07 NOTE — ED PROVIDER NOTES
History  Chief Complaint   Patient presents with    Abdominal Pain     Hx of diverticulitis ate something with peanuts in it 2 days ago and pain started since      Yesy Marquez is a 38y o  year old male with PMH of diverticulitis presenting to the [facility] ED for abdominal pain pain  Patient has had two days of intermittent left lower quadrant pain  Rated as 3/10 at worst  Nonradiating  No associated N/V/D  Last BM earlier today  Feels similar to prior episode of diverticulitis  Patient denies associated fevers/chills, dysuria/hematuria or flank pain  No pain in the penis/scrotum/testicles  No prior abdominal surgeries  The patient has taken tylenol at home for symptomatic treatment  History provided by:  Patient   used: No    Abdominal Pain  Associated symptoms: no chest pain, no chills, no constipation, no diarrhea, no dysuria, no fatigue, no fever, no hematuria, no nausea, no shortness of breath and no vomiting        Prior to Admission Medications   Prescriptions Last Dose Informant Patient Reported? Taking?    albuterol (Ventolin HFA) 90 mcg/act inhaler Past Month at Unknown time  No Yes   Sig: Inhale 2 puffs every 6 (six) hours as needed for wheezing or shortness of breath   pantoprazole (PROTONIX) 40 mg tablet 9/7/2021 at Unknown time  No Yes   Sig: Take 1 tablet (40 mg total) by mouth daily      Facility-Administered Medications: None       Past Medical History:   Diagnosis Date    Abscess     Allergic     Asthma     Diverticulitis     Diverticulitis of colon     Gastroesophageal reflux disease 4/9/2020    MRSA infection     of an abscess    Obesity     SIRS (systemic inflammatory response syndrome) (HCC)        Past Surgical History:   Procedure Laterality Date    FRACTURE SURGERY      INCISION AND DRAINAGE OF WOUND Left 12/23/2016    Groin    MANDIBLE FRACTURE SURGERY         Family History   Problem Relation Age of Onset    Diabetes Maternal Aunt  Asthma Mother     Depression Mother     No Known Problems Sister     No Known Problems Brother     No Known Problems Sister     No Known Problems Sister     No Known Problems Brother     No Known Problems Brother     No Known Problems Brother     No Known Problems Brother     No Known Problems Brother      I have reviewed and agree with the history as documented  E-Cigarette/Vaping    E-Cigarette Use Never User      E-Cigarette/Vaping Substances    Nicotine No     THC No     CBD No     Flavoring No     Other No     Unknown No      Social History     Tobacco Use    Smoking status: Current Every Day Smoker     Packs/day: 0 50     Years: 15 00     Pack years: 7 50     Types: Cigarettes    Smokeless tobacco: Never Used   Vaping Use    Vaping Use: Never used   Substance Use Topics    Alcohol use: Not Currently    Drug use: Yes     Frequency: 7 0 times per week     Types: Marijuana     Comment: multiple times daily         Review of Systems   Constitutional: Negative for chills, fatigue and fever  HENT: Negative for congestion and rhinorrhea  Respiratory: Negative for shortness of breath  Cardiovascular: Negative for chest pain  Gastrointestinal: Positive for abdominal pain  Negative for abdominal distention, blood in stool, constipation, diarrhea, nausea and vomiting  Endocrine: Negative for polyuria  Genitourinary: Negative for decreased urine volume, dysuria, flank pain, hematuria, penile pain, penile swelling, scrotal swelling and testicular pain  Musculoskeletal: Negative for arthralgias  Skin: Negative for rash  Neurological: Negative for seizures, syncope, weakness, light-headedness and headaches  Hematological: Does not bruise/bleed easily  Psychiatric/Behavioral: Negative for behavioral problems and confusion  All other systems reviewed and are negative        Physical Exam  ED Triage Vitals   Temperature Pulse Respirations Blood Pressure SpO2   09/07/21 0911 09/07/21 0903 09/07/21 0903 09/07/21 0903 09/07/21 0903   98 9 °F (37 2 °C) 105 18 134/82 98 %      Temp src Heart Rate Source Patient Position - Orthostatic VS BP Location FiO2 (%)   -- 09/07/21 0903 09/07/21 0903 09/07/21 0903 --    Monitor Lying Right arm       Pain Score       09/07/21 0903       Worst Possible Pain             Orthostatic Vital Signs  Vitals:    09/07/21 0903   BP: 134/82   Pulse: 105   Patient Position - Orthostatic VS: Lying       Physical Exam  Vitals and nursing note reviewed  Constitutional:       General: He is not in acute distress  Appearance: He is well-developed  He is obese  He is not ill-appearing, toxic-appearing or diaphoretic  HENT:      Head: Normocephalic and atraumatic  Nose: No congestion or rhinorrhea  Eyes:      General:         Right eye: No discharge  Left eye: No discharge  Cardiovascular:      Rate and Rhythm: Normal rate and regular rhythm  Pulmonary:      Effort: Pulmonary effort is normal  No respiratory distress  Breath sounds: Normal breath sounds  No wheezing or rales  Abdominal:      General: Bowel sounds are normal       Palpations: Abdomen is soft  Tenderness: There is abdominal tenderness in the left lower quadrant  There is no right CVA tenderness, left CVA tenderness, guarding or rebound  Musculoskeletal:      Cervical back: No rigidity  Skin:     General: Skin is warm  Capillary Refill: Capillary refill takes less than 2 seconds  Neurological:      Mental Status: He is alert and oriented to person, place, and time     Psychiatric:         Mood and Affect: Mood normal          Behavior: Behavior normal          ED Medications  Medications - No data to display    Diagnostic Studies  Results Reviewed     Procedure Component Value Units Date/Time    Urine Macroscopic, POC [[id number]] Collected: 09/07/21 1011    Lab Status: Final result Specimen: Urine Updated: 09/07/21 1012     Color, UA Orange     Clarity, UA Clear     pH, UA 7 0     Leukocytes, UA Negative     Nitrite, UA Negative     Protein, UA Negative mg/dl      Glucose, UA Negative mg/dl      Ketones, UA Negative mg/dl      Urobilinogen, UA 0 2 E U /dl      Bilirubin, UA Negative     Blood, UA Negative     Specific Gravity, UA 1 025    Narrative:      CLINITEK RESULT                 No orders to display         Procedures  Procedures      ED Course  ED Course as of Sep 08 0524   Tue Sep 07, 2021   1015 Leukocytes, UA: Negative   1015 Nitrite, UA: Negative                             SBIRT 22yo+      Most Recent Value   SBIRT (23 yo +)   In order to provide better care to our patients, we are screening all of our patients for alcohol and drug use  Would it be okay to ask you these screening questions? No Filed at: 09/07/2021 0913                MDM  Number of Diagnoses or Management Options  Acute diverticulitis  Diagnosis management comments:   38 y o  male presenting for abdominal pain  History and exam consistent with diverticulitis  Will check UA  Discussed likely diagnosis of diverticulitis  Offered to perform labs and CT, patient agreeable to forgo at this time time  I have discussed with the patient our plan to discharge them from the ED and the patient is in agreement with this plan  The patient was provided a written after visit summary with strict RTED precautions  Discharge Plan: Rx for cipro/flagyl, OTC symptomatic management, GI/colorectal f/u  Discussed need for f/u colonoscopy to r/o alternative etiology of diverticulitis  Followup: I have discussed with the patient plan to follow up with GI or colorectal surgery   Contact information provided in AVS        Amount and/or Complexity of Data Reviewed  Review and summarize past medical records: yes        Disposition  Final diagnoses:   Acute diverticulitis     Time reflects when diagnosis was documented in both MDM as applicable and the Disposition within this note     Time User Action Codes Description Comment    9/7/2021 10:08 AM Danelle Jones Add [K57 [id number]] Acute diverticulitis       ED Disposition     ED Disposition Condition Date/Time Comment    Discharge Stable Tue Sep 7, 2021 10:15 AM Tramaine Myersbiju discharge to home/self care  Follow-up Information     Follow up With Specialties Details Why Contact Info Additional [facility] Call  To establish care  [address] [facility] [address] [facility] [address] [facility] [address] [facility] [place] [address],  [address] [place] [address], [id number] [place] [address]    Andrew Jones MD Colon and Rectal Surgery Schedule an appointment as soon as possible for a visit  To establish care  [address] [facility] [address] [facility]             Discharge Medication List as of 9/7/2021 10:16 AM      START taking these medications    Details   ciprofloxacin (CIPRO) 500 mg tablet Take 1 tablet (500 mg total) by mouth 2 (two) times a day for 10 days, Starting Tue 9/7/2021, Until Fri 9/17/2021, Print      metroNIDAZOLE (FLAGYL) 500 mg tablet Take 1 tablet (500 mg total) by mouth every 8 (eight) hours for 10 days, Starting Tue 9/7/2021, Until Fri 9/17/2021, Print         CONTINUE these medications which have NOT CHANGED    Details   albuterol (Ventolin HFA) 90 mcg/act inhaler Inhale 2 puffs every 6 (six) hours as needed for wheezing or shortness of breath, Starting Fri 3/5/2021, Normal      pantoprazole (PROTONIX) 40 mg tablet Take 1 tablet (40 mg total) by mouth daily, Starting Fri 3/5/2021, Normal           No discharge procedures on file  PDMP Review     None           ED Provider  Attending physically available and evaluated Tramaine Blair I managed the patient along with the ED Attending      Electronically Signed by         Ann Ambrose DO  09/08/21 9767

## 2021-09-07 NOTE — ED ATTENDING ATTESTATION
9/7/2021  IYamel MD, saw and evaluated the patient  I have discussed the patient with the resident/non-physician practitioner and agree with the resident's/non-physician practitioner's findings, Plan of Care, and MDM as documented in the resident's/non-physician practitioner's note, except where noted  All available labs and Radiology studies were reviewed  I was present for key portions of any procedure(s) performed by the resident/non-physician practitioner and I was immediately available to provide assistance  At this point I agree with the current assessment done in the Emergency Department  I have conducted an independent evaluation of this patient a history and physical is as follows:    Patient presents the emergency department with a 2 day history of waxing and waning intermittent left lower quadrant discomfort  The patient reports this is similar to his prior episodes of diverticulitis but less severe than his typical course  The patient reports no fever, melena, hematochezia, or hematemesis  The patient passed a small amount of mucus rectally this morning and he noted there is slight red tinge to the mucus  The patient reports that the abdominal discomfort worsens when he has a full bladder  The patient denies any dysuria or urinary frequency  The patient denies any back pain  The patient denies any chest pain or shortness of breath  The patient reports he has no pain at this time  Physical exam demonstrates a male in no acute distress  HEENT exam is normal   Lungs are clear with equal breath sounds  The heart had a regular rate rhythm  The abdomen is soft and nontender  There are no masses  The back was nontender  Extremities are symmetric and nontender    ED Course         Critical Care Time  Procedures

## 2021-09-07 NOTE — DISCHARGE INSTRUCTIONS
You have been seen for abdominal pain  This is likely due to diverticulitis  Please complete the full course of ciprofloxacin and metronidazole as prescribed  Return to the emergency department if you develop worsening pain, fevers, vomiting or any other symptoms of concern  Please follow up with GI or colorectal surgery by calling the number provided

## 2022-03-22 ENCOUNTER — OFFICE VISIT (OUTPATIENT)
Dept: URGENT CARE | Age: 37
End: 2022-03-22
Payer: COMMERCIAL

## 2022-03-22 VITALS
TEMPERATURE: 98 F | WEIGHT: 284 LBS | DIASTOLIC BLOOD PRESSURE: 80 MMHG | SYSTOLIC BLOOD PRESSURE: 160 MMHG | OXYGEN SATURATION: 100 % | RESPIRATION RATE: 18 BRPM | HEART RATE: 74 BPM | HEIGHT: 71 IN | BODY MASS INDEX: 39.76 KG/M2

## 2022-03-22 DIAGNOSIS — K57.92 DIVERTICULITIS: ICD-10-CM

## 2022-03-22 DIAGNOSIS — R10.32 LEFT LOWER QUADRANT ABDOMINAL PAIN: Primary | ICD-10-CM

## 2022-03-22 PROCEDURE — 99213 OFFICE O/P EST LOW 20 MIN: CPT

## 2022-03-22 RX ORDER — CIPROFLOXACIN 500 MG/1
500 TABLET, FILM COATED ORAL EVERY 12 HOURS SCHEDULED
Qty: 20 TABLET | Refills: 0 | Status: SHIPPED | OUTPATIENT
Start: 2022-03-22 | End: 2022-04-01

## 2022-03-22 RX ORDER — METRONIDAZOLE 500 MG/1
500 TABLET ORAL EVERY 8 HOURS SCHEDULED
Qty: 30 TABLET | Refills: 0 | Status: SHIPPED | OUTPATIENT
Start: 2022-03-22 | End: 2022-04-01

## 2022-03-22 NOTE — PROGRESS NOTES
[facility]        NAME: Selam Salgado is a 43 y o  male  : 1985    MRN: [id number]  DATE: 2022  TIME: 4:08 PM    Assessment and Plan   Left lower quadrant abdominal pain [R10 32]  1  Left lower quadrant abdominal pain  ciprofloxacin (CIPRO) 500 mg tablet    metroNIDAZOLE (FLAGYL) 500 mg tablet   2  Diverticulitis        Considering the PMH of diverticulitis and patient presenting complaint, Cipro and Flagyl will be ordered  An in depth conversation was had regarding establishing primary care and GI follow-up for managing this chronic condition as it is better handled by specialists   Education was provided regarding diet  Patient expressed understanding  S/Sof worsening disease reviewed  Patient Instructions     Take medication as ordered  Establish care ASAP with PCP  Proceed to  ER if symptoms worsen  Chief Complaint     Chief Complaint   Patient presents with    Diverticulitis     pt has a history of and feels like it is "acting up" at this time  pt denies any pain but feels discomfort  History of Present Illness       The patient is a 42 y/o male with PMH diverticulitis  States he feels a flare up coming on and wants the medication to prevent it  States is having feeling of LLQ abdominal pain , diarrhea  Recently went away and ate all the foods he shouldn't be   Has been eating peanuts and corn  Last night the gas pains started and today feels like it is  States knows for a fact that its acting up and he needs cipro and flagyl  He states he has not established with a PCP and goes to the ED for his flare ups  Was trying to avoid the flare up worsening  He denies fevers, denies blood in his stool and appears in no apparent distress  Review of Systems   Review of Systems   Constitutional: Negative for activity change, appetite change and unexpected weight change  HENT: Negative for congestion  Respiratory: Negative for cough and shortness of breath  Cardiovascular: Negative for chest pain and palpitations  Gastrointestinal: Positive for abdominal pain and diarrhea  Negative for abdominal distention, blood in stool, constipation, nausea and vomiting  Genitourinary: Negative for flank pain  Musculoskeletal: Negative for myalgias  Neurological: Negative for dizziness and headaches           Current Medications       Current Outpatient Medications:     albuterol (Ventolin HFA) 90 mcg/act inhaler, Inhale 2 puffs every 6 (six) hours as needed for wheezing or shortness of breath, Disp: 18 g, Rfl: 0    pantoprazole (PROTONIX) 40 mg tablet, Take 1 tablet (40 mg total) by mouth daily, Disp: 90 tablet, Rfl: 1    ciprofloxacin (CIPRO) 500 mg tablet, Take 1 tablet (500 mg total) by mouth every 12 (twelve) hours for 10 days, Disp: 20 tablet, Rfl: 0    metroNIDAZOLE (FLAGYL) 500 mg tablet, Take 1 tablet (500 mg total) by mouth every 8 (eight) hours for 10 days, Disp: 30 tablet, Rfl: 0    Current Allergies     Allergies as of 03/22/2022    (No Known Allergies)            The following portions of the patient's history were reviewed and updated as appropriate: allergies, current medications, past family history, past medical history, past social history, past surgical history and problem list      Past Medical History:   Diagnosis Date    Abscess     Allergic     Asthma     Diverticulitis     Diverticulitis of colon     Gastroesophageal reflux disease 4/9/2020    MRSA infection     of an abscess    Obesity     SIRS (systemic inflammatory response syndrome) ([facility] [address] )        Past Surgical History:   Procedure Laterality Date    FRACTURE SURGERY      INCISION AND DRAINAGE OF WOUND Left 12/23/2016    Groin    MANDIBLE FRACTURE SURGERY         Family History   Problem Relation Age of Onset    Diabetes Maternal Aunt     Asthma Mother     Depression Mother     No Known Problems Sister     No Known Problems Brother     No Known Problems Sister     No Known Problems Sister     No Known Problems Brother     No Known Problems Brother     No Known Problems Brother     No Known Problems Brother     No Known Problems Brother          Medications have been verified  Objective   /80   Pulse 74   Temp 98 °F (36 7 °C)   Resp 18   Ht 5' 11" (1 803 m)   Wt 129 kg (284 lb)   SpO2 100%   BMI 39 61 kg/m²   No LMP for male patient  Physical Exam     Physical Exam  Vitals reviewed  Constitutional:       Appearance: Normal appearance  He is obese  HENT:      Head: Normocephalic  Nose: Nose normal    Eyes:      Pupils: Pupils are equal, round, and reactive to light  Cardiovascular:      Rate and Rhythm: Normal rate and regular rhythm  Pulses: Normal pulses  Heart sounds: Normal heart sounds  Pulmonary:      Effort: Pulmonary effort is normal       Breath sounds: Normal breath sounds  Abdominal:      General: Bowel sounds are normal  There is no distension  Palpations: Abdomen is soft  There is no mass  Tenderness: There is abdominal tenderness  There is no guarding or rebound  Comments: LLQ pain   Musculoskeletal:         General: Normal range of motion  Cervical back: Normal range of motion  Skin:     General: Skin is warm and dry  Capillary Refill: Capillary refill takes less than 2 seconds  Neurological:      General: No focal deficit present  Mental Status: He is alert and oriented to person, place, and time  Psychiatric:         Mood and Affect: Mood normal          Behavior: Behavior normal          Thought Content:  Thought content normal          Judgment: Judgment normal

## 2022-03-22 NOTE — PATIENT INSTRUCTIONS
Diverticulitis   AMBULATORY CARE:   Diverticulitis  is a condition that causes small pockets along your intestine called diverticula to become inflamed or infected  This is caused by hard bowel movement, food, or bacteria that get stuck in the pockets  Signs and symptoms include the following:   · Pain in the lower left side of your abdomen    · Fever and chills    · Nausea or vomiting    · Constipation or diarrhea    · An urge to urinate or have a bowel movement more often than usual    · Bloody bowel movements    · Bloating and gas    Seek care immediately if:   · You have bowel movement or foul-smelling discharge leaking from your vagina or in your urine  · You have severe diarrhea  · You urinate less than usual or not at all  · You are not able to have a bowel movement  · You cannot stop vomiting  · You have cramps or severe abdominal pain and a fever  · You have new or increased blood in your bowel movements  Call your doctor if:   · You have pain when you urinate  · Your symptoms get worse or do not go away  · You have questions or concerns about your condition or care  Treatment:  Mild diverticulitis can be treated at home  You may need to rest and follow a clear liquid diet until your diverticulitis gets better  You will be admitted to the hospital if you have severe diverticulitis  You may need any of the following:  · Antibiotics  help treat or prevent a bacterial infection  · Prescription pain medicine  may be given  Ask your healthcare provider how to take this medicine safely  Some prescription pain medicines contain acetaminophen  Do not take other medicines that contain acetaminophen without talking to your healthcare provider  Too much acetaminophen may cause liver damage  Prescription pain medicine may cause constipation  Ask your healthcare provider how to prevent or treat constipation  · An IV  may be used to give you liquids and nutrition   You may not be able to eat or drink anything until your healthcare provider says it is okay  · Drainage  may be done to reduce inflammation or treat infection  Your healthcare provider may insert a small tube through an incision in your abdomen to drain pus from infected diverticula  · Surgery  may be needed if there is a hole in your bowel or a large amount of swelling  A healthcare provider will remove the infected or inflamed areas of your colon  Clear liquid diet:  A clear liquid diet includes any liquids that you can see through  Examples include water, ginger-laura, cranberry or apple juice, frozen fruit ice, or broth  Stay on a clear liquid diet until your symptoms are gone, or as directed  Follow up with your doctor as directed: You may need to return for a colonoscopy  When your symptoms are gone, you may need a low-fat, high-fiber diet to prevent diverticulitis from developing again  Your healthcare provider or dietitian can help you create meal plans  Write down your questions so you remember to ask them during your visits  © Copyright [employer] 2022 Information is for End User's use only and may not be sold, redistributed or otherwise used for commercial purposes  All illustrations and images included in CareNotes® are the copyrighted property of A [employer] A M [employer] Inc  or [facility] [employer]   The above information is an  only  It is not intended as medical advice for individual conditions or treatments  Talk to your doctor, nurse or pharmacist before following any medical regimen to see if it is safe and effective for you

## 2022-04-20 ENCOUNTER — HOSPITAL ENCOUNTER (INPATIENT)
Facility: HOSPITAL | Age: 37
LOS: 2 days | Discharge: HOME | DRG: 244 | End: 2022-04-22
Attending: EMERGENCY MEDICINE | Admitting: STUDENT IN AN ORGANIZED HEALTH CARE EDUCATION/TRAINING PROGRAM
Payer: COMMERCIAL

## 2022-04-20 ENCOUNTER — APPOINTMENT (EMERGENCY)
Dept: CT IMAGING | Facility: HOSPITAL | Age: 37
DRG: 244 | End: 2022-04-20
Payer: COMMERCIAL

## 2022-04-20 DIAGNOSIS — K57.32 SIGMOID DIVERTICULITIS: ICD-10-CM

## 2022-04-20 DIAGNOSIS — K57.92 DIVERTICULITIS: Primary | ICD-10-CM

## 2022-04-20 LAB
ALBUMIN SERPL BCP-MCNC: 3.8 G/DL (ref 3.5–5)
ALP SERPL-CCNC: 112 U/L (ref 46–116)
ALT SERPL W P-5'-P-CCNC: 27 U/L (ref 12–78)
ANION GAP SERPL CALCULATED.3IONS-SCNC: 9 MMOL/L (ref 4–13)
AST SERPL W P-5'-P-CCNC: 25 U/L (ref 5–45)
BACTERIA UR QL AUTO: ABNORMAL /HPF
BASOPHILS # BLD AUTO: 0.07 THOUSANDS/ΜL (ref 0–0.1)
BASOPHILS NFR BLD AUTO: 1 % (ref 0–1)
BILIRUB SERPL-MCNC: 0.38 MG/DL (ref 0.2–1)
BILIRUB UR QL STRIP: NEGATIVE
BUN SERPL-MCNC: 11 MG/DL (ref 5–25)
CALCIUM SERPL-MCNC: 9 MG/DL (ref 8.3–10.1)
CHLORIDE SERPL-SCNC: 100 MMOL/L (ref 100–108)
CLARITY UR: CLEAR
CO2 SERPL-SCNC: 28 MMOL/L (ref 21–32)
COLOR UR: YELLOW
CREAT SERPL-MCNC: 1.07 MG/DL (ref 0.6–1.3)
EOSINOPHIL # BLD AUTO: 0.22 THOUSAND/ΜL (ref 0–0.61)
EOSINOPHIL NFR BLD AUTO: 2 % (ref 0–6)
ERYTHROCYTE [DISTWIDTH] IN BLOOD BY AUTOMATED COUNT: 12.6 % (ref 11.6–15.1)
GFR SERPL CREATININE-BSD FRML MDRD: 87 ML/MIN/1.73SQ M
GLUCOSE SERPL-MCNC: 97 MG/DL (ref 65–140)
GLUCOSE UR STRIP-MCNC: NEGATIVE MG/DL
HCT VFR BLD AUTO: 43.3 % (ref 36.5–49.3)
HGB BLD-MCNC: 14.2 G/DL (ref 12–17)
HGB UR QL STRIP.AUTO: NEGATIVE
IMM GRANULOCYTES # BLD AUTO: 0.05 THOUSAND/UL (ref 0–0.2)
IMM GRANULOCYTES NFR BLD AUTO: 0 % (ref 0–2)
KETONES UR STRIP-MCNC: NEGATIVE MG/DL
LEUKOCYTE ESTERASE UR QL STRIP: ABNORMAL
LYMPHOCYTES # BLD AUTO: 2.63 THOUSANDS/ΜL (ref 0.6–4.47)
LYMPHOCYTES NFR BLD AUTO: 23 % (ref 14–44)
MCH RBC QN AUTO: 29.4 PG (ref 26.8–34.3)
MCHC RBC AUTO-ENTMCNC: 32.8 G/DL (ref 31.4–37.4)
MCV RBC AUTO: 90 FL (ref 82–98)
MONOCYTES # BLD AUTO: 0.62 THOUSAND/ΜL (ref 0.17–1.22)
MONOCYTES NFR BLD AUTO: 6 % (ref 4–12)
NEUTROPHILS # BLD AUTO: 7.76 THOUSANDS/ΜL (ref 1.85–7.62)
NEUTS SEG NFR BLD AUTO: 68 % (ref 43–75)
NITRITE UR QL STRIP: NEGATIVE
NON-SQ EPI CELLS URNS QL MICRO: ABNORMAL /HPF
NRBC BLD AUTO-RTO: 0 /100 WBCS
PH UR STRIP.AUTO: 5.5 [PH] (ref 4.5–8)
PLATELET # BLD AUTO: 311 THOUSANDS/UL (ref 149–390)
PMV BLD AUTO: 9.4 FL (ref 8.9–12.7)
POTASSIUM SERPL-SCNC: 3.5 MMOL/L (ref 3.5–5.3)
PROT SERPL-MCNC: 8.7 G/DL (ref 6.4–8.2)
PROT UR STRIP-MCNC: NEGATIVE MG/DL
RBC # BLD AUTO: 4.83 MILLION/UL (ref 3.88–5.62)
RBC #/AREA URNS AUTO: ABNORMAL /HPF
SODIUM SERPL-SCNC: 137 MMOL/L (ref 136–145)
SP GR UR STRIP.AUTO: >=1.03 (ref 1–1.03)
UROBILINOGEN UR QL STRIP.AUTO: 0.2 E.U./DL
WBC # BLD AUTO: 11.35 THOUSAND/UL (ref 4.31–10.16)
WBC #/AREA URNS AUTO: ABNORMAL /HPF

## 2022-04-20 PROCEDURE — 96361 HYDRATE IV INFUSION ADD-ON: CPT

## 2022-04-20 PROCEDURE — 85025 COMPLETE CBC W/AUTO DIFF WBC: CPT | Performed by: PHYSICIAN ASSISTANT

## 2022-04-20 PROCEDURE — 80053 COMPREHEN METABOLIC PANEL: CPT | Performed by: PHYSICIAN ASSISTANT

## 2022-04-20 PROCEDURE — 99285 EMERGENCY DEPT VISIT HI MDM: CPT | Performed by: PHYSICIAN ASSISTANT

## 2022-04-20 PROCEDURE — 96374 THER/PROPH/DIAG INJ IV PUSH: CPT

## 2022-04-20 PROCEDURE — 74177 CT ABD & PELVIS W/CONTRAST: CPT

## 2022-04-20 PROCEDURE — G1004 CDSM NDSC: HCPCS

## 2022-04-20 PROCEDURE — 36415 COLL VENOUS BLD VENIPUNCTURE: CPT | Performed by: PHYSICIAN ASSISTANT

## 2022-04-20 PROCEDURE — 99223 1ST HOSP IP/OBS HIGH 75: CPT | Performed by: STUDENT IN AN ORGANIZED HEALTH CARE EDUCATION/TRAINING PROGRAM

## 2022-04-20 PROCEDURE — 81001 URINALYSIS AUTO W/SCOPE: CPT

## 2022-04-20 PROCEDURE — 99285 EMERGENCY DEPT VISIT HI MDM: CPT

## 2022-04-20 RX ORDER — ACETAMINOPHEN 325 MG/1
650 TABLET ORAL EVERY 6 HOURS PRN
Status: DISCONTINUED | OUTPATIENT
Start: 2022-04-20 | End: 2022-04-22 | Stop reason: HOSPADM

## 2022-04-20 RX ORDER — DICYCLOMINE HYDROCHLORIDE 10 MG/1
10 CAPSULE ORAL
Status: DISCONTINUED | OUTPATIENT
Start: 2022-04-20 | End: 2022-04-22 | Stop reason: HOSPADM

## 2022-04-20 RX ORDER — ALBUTEROL SULFATE 90 UG/1
2 AEROSOL, METERED RESPIRATORY (INHALATION) EVERY 4 HOURS PRN
Status: DISCONTINUED | OUTPATIENT
Start: 2022-04-20 | End: 2022-04-22 | Stop reason: HOSPADM

## 2022-04-20 RX ORDER — KETOROLAC TROMETHAMINE 30 MG/ML
15 INJECTION, SOLUTION INTRAMUSCULAR; INTRAVENOUS ONCE
Status: COMPLETED | OUTPATIENT
Start: 2022-04-20 | End: 2022-04-20

## 2022-04-20 RX ORDER — ONDANSETRON 2 MG/ML
4 INJECTION INTRAMUSCULAR; INTRAVENOUS EVERY 6 HOURS PRN
Status: DISCONTINUED | OUTPATIENT
Start: 2022-04-20 | End: 2022-04-22 | Stop reason: HOSPADM

## 2022-04-20 RX ORDER — METRONIDAZOLE 500 MG/1
500 TABLET ORAL EVERY 8 HOURS SCHEDULED
Status: DISCONTINUED | OUTPATIENT
Start: 2022-04-20 | End: 2022-04-22 | Stop reason: HOSPADM

## 2022-04-20 RX ORDER — NICOTINE 21 MG/24HR
1 PATCH, TRANSDERMAL 24 HOURS TRANSDERMAL DAILY
Status: DISCONTINUED | OUTPATIENT
Start: 2022-04-21 | End: 2022-04-22 | Stop reason: HOSPADM

## 2022-04-20 RX ORDER — PANTOPRAZOLE SODIUM 40 MG/1
40 TABLET, DELAYED RELEASE ORAL
Status: DISCONTINUED | OUTPATIENT
Start: 2022-04-21 | End: 2022-04-22 | Stop reason: HOSPADM

## 2022-04-20 RX ADMIN — DICYCLOMINE HYDROCHLORIDE 10 MG: 10 CAPSULE ORAL at 22:56

## 2022-04-20 RX ADMIN — METRONIDAZOLE 500 MG: 500 TABLET ORAL at 22:56

## 2022-04-20 RX ADMIN — IOHEXOL 100 ML: 350 INJECTION, SOLUTION INTRAVENOUS at 21:20

## 2022-04-20 RX ADMIN — KETOROLAC TROMETHAMINE 15 MG: 30 INJECTION, SOLUTION INTRAMUSCULAR at 23:00

## 2022-04-20 RX ADMIN — SODIUM CHLORIDE 1000 ML: 0.9 INJECTION, SOLUTION INTRAVENOUS at 20:41

## 2022-04-20 RX ADMIN — PIPERACILLIN SODIUM AND TAZOBACTAM SODIUM 3.38 G: 36; 4.5 INJECTION, POWDER, LYOPHILIZED, FOR SOLUTION INTRAVENOUS at 22:23

## 2022-04-20 RX ADMIN — CEFTRIAXONE SODIUM 1000 MG: 10 INJECTION, POWDER, FOR SOLUTION INTRAVENOUS at 23:52

## 2022-04-21 LAB
ANION GAP SERPL CALCULATED.3IONS-SCNC: 9 MMOL/L (ref 4–13)
BASOPHILS # BLD AUTO: 0.08 THOUSANDS/ΜL (ref 0–0.1)
BASOPHILS NFR BLD AUTO: 1 % (ref 0–1)
BUN SERPL-MCNC: 12 MG/DL (ref 5–25)
CALCIUM SERPL-MCNC: 8.6 MG/DL (ref 8.3–10.1)
CHLORIDE SERPL-SCNC: 105 MMOL/L (ref 100–108)
CO2 SERPL-SCNC: 26 MMOL/L (ref 21–32)
CREAT SERPL-MCNC: 0.99 MG/DL (ref 0.6–1.3)
EOSINOPHIL # BLD AUTO: 0.35 THOUSAND/ΜL (ref 0–0.61)
EOSINOPHIL NFR BLD AUTO: 4 % (ref 0–6)
ERYTHROCYTE [DISTWIDTH] IN BLOOD BY AUTOMATED COUNT: 12.7 % (ref 11.6–15.1)
GFR SERPL CREATININE-BSD FRML MDRD: 96 ML/MIN/1.73SQ M
GLUCOSE SERPL-MCNC: 131 MG/DL (ref 65–140)
HCT VFR BLD AUTO: 39.5 % (ref 36.5–49.3)
HGB BLD-MCNC: 12.8 G/DL (ref 12–17)
IMM GRANULOCYTES # BLD AUTO: 0.03 THOUSAND/UL (ref 0–0.2)
IMM GRANULOCYTES NFR BLD AUTO: 0 % (ref 0–2)
LYMPHOCYTES # BLD AUTO: 3.24 THOUSANDS/ΜL (ref 0.6–4.47)
LYMPHOCYTES NFR BLD AUTO: 33 % (ref 14–44)
MCH RBC QN AUTO: 29.4 PG (ref 26.8–34.3)
MCHC RBC AUTO-ENTMCNC: 32.4 G/DL (ref 31.4–37.4)
MCV RBC AUTO: 91 FL (ref 82–98)
MONOCYTES # BLD AUTO: 0.67 THOUSAND/ΜL (ref 0.17–1.22)
MONOCYTES NFR BLD AUTO: 7 % (ref 4–12)
NEUTROPHILS # BLD AUTO: 5.6 THOUSANDS/ΜL (ref 1.85–7.62)
NEUTS SEG NFR BLD AUTO: 55 % (ref 43–75)
NRBC BLD AUTO-RTO: 0 /100 WBCS
PLATELET # BLD AUTO: 267 THOUSANDS/UL (ref 149–390)
PMV BLD AUTO: 9.5 FL (ref 8.9–12.7)
POTASSIUM SERPL-SCNC: 3.7 MMOL/L (ref 3.5–5.3)
RBC # BLD AUTO: 4.36 MILLION/UL (ref 3.88–5.62)
SODIUM SERPL-SCNC: 140 MMOL/L (ref 136–145)
WBC # BLD AUTO: 9.97 THOUSAND/UL (ref 4.31–10.16)

## 2022-04-21 PROCEDURE — 85025 COMPLETE CBC W/AUTO DIFF WBC: CPT | Performed by: STUDENT IN AN ORGANIZED HEALTH CARE EDUCATION/TRAINING PROGRAM

## 2022-04-21 PROCEDURE — 99254 IP/OBS CNSLTJ NEW/EST MOD 60: CPT | Performed by: PHYSICIAN ASSISTANT

## 2022-04-21 PROCEDURE — 99232 SBSQ HOSP IP/OBS MODERATE 35: CPT | Performed by: INTERNAL MEDICINE

## 2022-04-21 PROCEDURE — 80048 BASIC METABOLIC PNL TOTAL CA: CPT | Performed by: STUDENT IN AN ORGANIZED HEALTH CARE EDUCATION/TRAINING PROGRAM

## 2022-04-21 RX ADMIN — CEFTRIAXONE SODIUM 1000 MG: 10 INJECTION, POWDER, FOR SOLUTION INTRAVENOUS at 21:51

## 2022-04-21 RX ADMIN — METRONIDAZOLE 500 MG: 500 TABLET ORAL at 05:25

## 2022-04-21 RX ADMIN — ENOXAPARIN SODIUM 40 MG: 40 INJECTION SUBCUTANEOUS at 08:04

## 2022-04-21 RX ADMIN — DICYCLOMINE HYDROCHLORIDE 10 MG: 10 CAPSULE ORAL at 15:35

## 2022-04-21 RX ADMIN — METRONIDAZOLE 500 MG: 500 TABLET ORAL at 15:35

## 2022-04-21 RX ADMIN — DICYCLOMINE HYDROCHLORIDE 10 MG: 10 CAPSULE ORAL at 11:46

## 2022-04-21 RX ADMIN — DICYCLOMINE HYDROCHLORIDE 10 MG: 10 CAPSULE ORAL at 08:04

## 2022-04-21 RX ADMIN — METRONIDAZOLE 500 MG: 500 TABLET ORAL at 21:49

## 2022-04-21 RX ADMIN — DICYCLOMINE HYDROCHLORIDE 10 MG: 10 CAPSULE ORAL at 21:49

## 2022-04-21 RX ADMIN — PANTOPRAZOLE SODIUM 40 MG: 40 TABLET, DELAYED RELEASE ORAL at 05:25

## 2022-04-21 NOTE — ED PROVIDER NOTES
History  Chief Complaint   Patient presents with    Abdominal Pain     LLQ pain starting today, denies NVD     Patient is a 42 y/o male hx of diverticulitis, presenting to the ED for evaluation of abdominal pain  Pt reports on 3/22 he was seen at his PCP for LLQ pain, was given cipro/flagyl  Pt states he left for [place] and forgot the medication so he never took it  Pt states he ate healthy that week and symptoms improved  Pt states 5 days ago he began with pain to LLQ once again, described as sharp sensation  Pt states he started cipro/flagyl, has been taking for 5 days and still has pain  Pt with decreased appetite  Reports diarrhea from medication, last episode this AM  Pt without fever, chills, chest pain, SOB, constipation, N/V, flank pain, urinary sx, testicular pain/swelling  Prior to Admission Medications   Prescriptions Last Dose Informant Patient Reported? Taking?    albuterol (Ventolin HFA) 90 mcg/act inhaler   No No   Sig: Inhale 2 puffs every 6 (six) hours as needed for wheezing or shortness of breath   pantoprazole (PROTONIX) 40 mg tablet   No No   Sig: Take 1 tablet (40 mg total) by mouth daily      Facility-Administered Medications: None       Past Medical History:   Diagnosis Date    Abscess     Allergic     Asthma     Diverticulitis     Diverticulitis of colon     Gastroesophageal reflux disease 4/9/2020    MRSA infection     of an abscess    Obesity     SIRS (systemic inflammatory response syndrome) (HCC)        Past Surgical History:   Procedure Laterality Date    FRACTURE SURGERY      INCISION AND DRAINAGE OF WOUND Left 12/23/2016    Groin    MANDIBLE FRACTURE SURGERY         Family History   Problem Relation Age of Onset    Diabetes Maternal Aunt     Asthma Mother     Depression Mother     No Known Problems Sister     No Known Problems Brother     No Known Problems Sister     No Known Problems Sister     No Known Problems Brother     No Known Problems Brother     No Known Problems Brother     No Known Problems Brother     No Known Problems Brother      I have reviewed and agree with the history as documented  E-Cigarette/Vaping    E-Cigarette Use Never User      E-Cigarette/Vaping Substances    Nicotine No     THC No     CBD No     Flavoring No     Other No     Unknown No      Social History     Tobacco Use    Smoking status: Current Every Day Smoker     Packs/day: 0 50     Years: 15 00     Pack years: 7 50     Types: Cigarettes    Smokeless tobacco: Never Used   Vaping Use    Vaping Use: Never used   Substance Use Topics    Alcohol use: Not Currently    Drug use: Yes     Frequency: 7 0 times per week     Types: Marijuana     Comment: multiple times daily        Review of Systems   Constitutional: Negative for chills and fever  HENT: Negative for congestion, ear pain and sore throat  Eyes: Negative for visual disturbance  Respiratory: Negative for cough and shortness of breath  Cardiovascular: Negative for chest pain  Gastrointestinal: Positive for abdominal pain and diarrhea  Negative for constipation, nausea and vomiting  Genitourinary: Negative for dysuria and hematuria  Musculoskeletal: Negative for back pain and neck pain  Skin: Negative for rash  Neurological: Negative for dizziness, speech difficulty, weakness and headaches  Psychiatric/Behavioral: Negative for confusion  Physical Exam  Physical Exam  Constitutional:       General: He is not in acute distress  Appearance: He is well-developed  He is not ill-appearing, toxic-appearing or diaphoretic  HENT:      Head: Normocephalic and atraumatic  Right Ear: External ear normal       Left Ear: External ear normal       Nose: Nose normal       Mouth/Throat:      Lips: Pink  Mouth: Mucous membranes are moist    Eyes:      Extraocular Movements: Extraocular movements intact        Conjunctiva/sclera: Conjunctivae normal    Cardiovascular:      Rate and Rhythm: Normal rate and regular rhythm  Pulmonary:      Effort: Pulmonary effort is normal       Breath sounds: Normal breath sounds  No decreased breath sounds, wheezing, rhonchi or rales  Abdominal:      General: Abdomen is protuberant  Palpations: Abdomen is soft  Tenderness: There is abdominal tenderness in the left lower quadrant  There is no right CVA tenderness, left CVA tenderness, guarding or rebound  Musculoskeletal:      Cervical back: Normal range of motion and neck supple  Skin:     General: Skin is warm  Capillary Refill: Capillary refill takes less than 2 seconds  Coloration: Skin is not jaundiced or pale  Findings: No rash  Neurological:      Mental Status: He is alert and oriented to person, place, and time     Psychiatric:         Mood and Affect: Mood and affect normal          Speech: Speech normal          Vital Signs  ED Triage Vitals   Temperature Pulse Respirations Blood Pressure SpO2   04/20/22 1948 04/20/22 1948 04/20/22 1948 04/20/22 1949 04/20/22 1948   98 7 °F (37 1 °C) (!) 118 17 167/91 98 %      Temp Source Heart Rate Source Patient Position - Orthostatic VS BP Location FiO2 (%)   04/20/22 1948 04/20/22 1948 04/20/22 2151 04/20/22 2151 --   Oral Monitor Lying Left arm       Pain Score       04/20/22 1948       10 - Worst Possible Pain           Vitals:    04/20/22 1948 04/20/22 1949 04/20/22 2151   BP:  167/91 134/76   Pulse: (!) 118  94   Patient Position - Orthostatic VS:   Lying         Visual Acuity      ED Medications  Medications   ketorolac (TORADOL) injection 15 mg (has no administration in time range)   ceftriaxone (ROCEPHIN) 1 g/50 mL in dextrose IVPB (has no administration in time range)   metroNIDAZOLE (FLAGYL) tablet 500 mg (has no administration in time range)   acetaminophen (TYLENOL) tablet 650 mg (has no administration in time range)   ondansetron (ZOFRAN) injection 4 mg (has no administration in time range)   nicotine (NICODERM CQ) 14 mg/24hr TD 24 hr patch 1 patch (has no administration in time range)   enoxaparin (LOVENOX) subcutaneous injection 40 mg (has no administration in time range)   dicyclomine (BENTYL) capsule 10 mg (has no administration in time range)   albuterol (PROVENTIL HFA,VENTOLIN HFA) inhaler 2 puff (has no administration in time range)   pantoprazole (PROTONIX) EC tablet 40 mg (has no administration in time range)   sodium chloride 0 9 % bolus 1,000 mL (1,000 mL Intravenous New Bag 4/20/22 2041)   iohexol (OMNIPAQUE) 350 MG/ML injection (SINGLE-DOSE) 100 mL (100 mL Intravenous Given 4/20/22 2120)   piperacillin-tazobactam (ZOSYN) IVPB 3 375 g (3 375 g Intravenous New Bag 4/20/22 2223)       Diagnostic Studies  Results Reviewed     Procedure Component Value Units Date/Time    Urine Microscopic [[id number]]  (Abnormal) Collected: 04/20/22 2120    Lab Status: Final result Specimen: Urine, Clean Catch Updated: 04/20/22 2136     RBC, UA None Seen /hpf      WBC, UA 0-1 /hpf      Epithelial Cells Occasional /hpf      Bacteria, UA Occasional /hpf     Urine Macroscopic, POC [[id number]]  (Abnormal) Collected: 04/20/22 2120    Lab Status: Final result Specimen: Urine Updated: 04/20/22 2122     Color, UA Yellow     Clarity, UA Clear     pH, UA 5 5     Leukocytes, UA Trace     Nitrite, UA Negative     Protein, UA Negative mg/dl      Glucose, UA Negative mg/dl      Ketones, UA Negative mg/dl      Urobilinogen, UA 0 2 E U /dl      Bilirubin, UA Negative     Blood, UA Negative     Specific Gravity, UA >=1 030    Narrative:      CLINITEK RESULT    Comprehensive metabolic panel [[id number]]  (Abnormal) Collected: 04/20/22 2043    Lab Status: Final result Specimen: Blood from Arm, Right Updated: 04/20/22 2113     Sodium 137 mmol/L      Potassium 3 5 mmol/L      Chloride 100 mmol/L      CO2 28 mmol/L      ANION GAP 9 mmol/L      BUN 11 mg/dL      Creatinine 1 07 mg/dL      Glucose 97 mg/dL      Calcium 9 0 mg/dL      AST 25 U/L      ALT 27 U/L      Alkaline Phosphatase 112 U/L      Total Protein 8 7 g/dL      Albumin 3 8 g/dL      Total Bilirubin 0 38 mg/dL      eGFR 87 ml/min/1 73sq m     Narrative:      National Kidney Disease Foundation guidelines for Chronic Kidney Disease (CKD):     Stage 1 with normal or high GFR (GFR > 90 mL/min/1 73 square meters)    Stage 2 Mild CKD (GFR = 60-89 mL/min/1 73 square meters)    Stage 3A Moderate CKD (GFR = 45-59 mL/min/1 73 square meters)    Stage 3B Moderate CKD (GFR = 30-44 mL/min/1 73 square meters)    Stage 4 Severe CKD (GFR = 15-29 mL/min/1 73 square meters)    Stage 5 End Stage CKD (GFR <15 mL/min/1 73 square meters)  Note: GFR calculation is accurate only with a steady state creatinine    CBC and differential [[id number]]  (Abnormal) Collected: 04/20/22 2043    Lab Status: Final result Specimen: Blood from Arm, Right Updated: 04/20/22 2049     WBC 11 35 Thousand/uL      RBC 4 83 Million/uL      Hemoglobin 14 2 g/dL      Hematocrit 43 3 %      MCV 90 fL      MCH 29 4 pg      MCHC 32 8 g/dL      RDW 12 6 %      MPV 9 4 fL      Platelets 206 Thousands/uL      nRBC 0 /100 WBCs      Neutrophils Relative 68 %      Immat GRANS % 0 %      Lymphocytes Relative 23 %      Monocytes Relative 6 %      Eosinophils Relative 2 %      Basophils Relative 1 %      Neutrophils Absolute 7 76 Thousands/µL      Immature Grans Absolute 0 05 Thousand/uL      Lymphocytes Absolute 2 63 Thousands/µL      Monocytes Absolute 0 62 Thousand/µL      Eosinophils Absolute 0 22 Thousand/µL      Basophils Absolute 0 07 Thousands/µL                  CT abdomen pelvis with contrast   Final Result by Sukumar Prince MD (04/20 2157)      Acute severe sigmoid diverticulitis  No free air  Workstation performed: [id number]                    Procedures  Procedures         ED Course  ED Course as of 04/20/22 2256 Wed Apr 20, 2022 2200 CT abdomen pelvis with contrast  Acute severe sigmoid diverticulitis  No free air   2225 Discussed with ADELIA Villegas had a detailed discussion of the patient's condition and case, including need for admission   Accepts to their service   Bed request/bridging orders placed  SBIRT 20yo+      Most Recent Value   SBIRT (22 yo +)    In order to provide better care to our patients, we are screening all of our patients for alcohol and drug use  Would it be okay to ask you these screening questions? No Filed at: 04/20/2022 2117                    MDM  Number of Diagnoses or Management Options  Diverticulitis: new and requires workup  Diagnosis management comments: Patient is a 42 y/o male hx of diverticulitis, presenting to the ED for evaluation of abdominal pain  Will obtain labs, urine and CT a/p  CT a/p shows severe diverticulitis w/o perforation or abscess  Given failed outpatient abx, will admit for IV abx and GI consult  Patient does not meet SIRS, zosyn started    Discussed with SLIM  We had a detailed discussion of the patient's condition and case, including need for admission   Accepts to their service   Bed request/bridging orders placed  Disposition  Final diagnoses:   Diverticulitis     Time reflects when diagnosis was documented in both MDM as applicable and the Disposition within this note     Time User Action Codes Description Comment    4/20/2022 10:24 PM Jasper Form Add [T70 09] Diverticulitis       ED Disposition     ED Disposition Condition Date/Time Comment    Admit Stable Wed Apr 20, 2022 10:24 PM Case was discussed with ADELIA and the patient's admission status was agreed to be Admission Status: inpatient status to the service of Dr Ilya Nichole   Follow-up Information    None         Patient's Medications   Discharge Prescriptions    No medications on file       No discharge procedures on file      PDMP Review     None          ED Provider  Electronically Signed by           Alberto Easton PA-C  04/20/22 5138

## 2022-04-21 NOTE — ASSESSMENT & PLAN NOTE
Recurrent diverticulits without perforation or abscess  Continue IV antibiotics  Anticipate 14 days total of treatment  Discussed surgical evaluation given multiple episodes of diverticulitis  GI evaluation and recommendations reviewed  Surgical referral will be done as an outpatient    He will need eventual colonoscopy after he recovers from this acute episode

## 2022-04-21 NOTE — PLAN OF CARE
Problem: PAIN - ADULT  Goal: Verbalizes/displays adequate comfort level or baseline comfort level  Description: Interventions:  - Encourage patient to monitor pain and request assistance  - Assess pain using appropriate pain scale  - Administer analgesics based on type and severity of pain and evaluate response  - Implement non-pharmacological measures as appropriate and evaluate response  - Consider cultural and social influences on pain and pain management  - Notify physician/advanced practitioner if interventions unsuccessful or patient reports new pain  Outcome: Progressing     Problem: DISCHARGE PLANNING  Goal: Discharge to home or other facility with appropriate resources  Description: INTERVENTIONS:  - Identify barriers to discharge w/patient and caregiver  - Arrange for needed discharge resources and transportation as appropriate  - Identify discharge learning needs (meds, wound care, etc )  - Arrange for interpretive services to assist at discharge as needed  - Refer to Case Management Department for coordinating discharge planning if the patient needs post-hospital services based on physician/advanced practitioner order or complex needs related to functional status, cognitive ability, or social support system  Outcome: Progressing     Problem: INFECTION - ADULT  Goal: Absence or prevention of progression during hospitalization  Description: INTERVENTIONS:  - Assess and monitor for signs and symptoms of infection  - Monitor lab/diagnostic results  - Monitor all insertion sites, i e  indwelling lines, tubes, and drains  - Monitor endotracheal if appropriate and nasal secretions for changes in amount and color  - Porter appropriate cooling/warming therapies per order  - Administer medications as ordered  - Instruct and encourage patient and family to use good hand hygiene technique  - Identify and instruct in appropriate isolation precautions for identified infection/condition  Outcome: Progressing  Goal: Absence of fever/infection during neutropenic period  Description: INTERVENTIONS:  - Monitor WBC    Outcome: Progressing

## 2022-04-21 NOTE — PROGRESS NOTES
[facility] 46  Progress Note - Cecil Michaud 1985, 43 y o  male MRN: [id number]  Unit/Bed#: [place] [address] -01 Encounter: [id number]  Primary Care Provider: KELVIN Cruz   Date and time admitted to hospital: 2022  7:54 PM    * Sigmoid diverticulitis  Assessment & Plan  Recurrent diverticulits without perforation or abscess  Continue IV antibiotics  Anticipate 14 days total of treatment  Discussed surgical evaluation given multiple episodes of diverticulitis  GI evaluation and recommendations reviewed  Surgical referral will be done as an outpatient  He will need eventual colonoscopy after he recovers from this acute episode    Morbid obesity ([address] )  Assessment & Plan  BMI 40  Asthma  Assessment & Plan  Intermittent asthma without exacerbation  Monitor    Gastroesophageal reflux disease  Assessment & Plan  Continue protonix daily    Current smoker  Assessment & Plan  Continue nicotine patch      VTE Pharmacologic Prophylaxis:   Pharmacologic: Enoxaparin (Lovenox)  Mechanical VTE Prophylaxis in Place: Yes    Patient Centered Rounds: I have performed bedside rounds with nursing staff today  Education and Discussions with Family / Patient:  Offered to call his family which he declined    Time Spent for Care: 20 minutes  More than 50% of total time spent on counseling and coordination of care as described above      Current Length of Stay: 1 day(s)    Current Patient Status: Inpatient   Certification Statement: The patient will continue to require additional inpatient hospital stay due to Severe sigmoid diverticulitis    Discharge Plan:  In 24-48 hours    Code Status: Level 1 - Full Code      Subjective:   + abdominal pain  No fever  No nausea  No vomiting    Objective:     Vitals:   Temp (24hrs), Av 6 °F (37 °C), Min:98 2 °F (36 8 °C), Max:98 9 °F (37 2 °C)    Temp:  [98 2 °F (36 8 °C)-98 9 °F (37 2 °C)] 98 2 °F (36 8 °C)  HR:  [] 75  Resp:  [16-18] 16  BP: (117-167)/() 117/73  SpO2:  [94 %-98 %] 96 %  Body mass index is 40 83 kg/m²  Input and Output Summary (last 24 hours): Intake/Output Summary (Last 24 hours) at 4/21/2022 1501  Last data filed at 4/20/2022 2302  Gross per 24 hour   Intake 100 ml   Output --   Net 100 ml       Physical Exam:     Physical Exam  Constitutional:       General: He is not in acute distress  Appearance: He is obese  He is not ill-appearing, toxic-appearing or diaphoretic  HENT:      Head: Normocephalic  Nose: No rhinorrhea  Eyes:      General: No scleral icterus  Extraocular Movements: Extraocular movements intact  Conjunctiva/sclera: Conjunctivae normal       Pupils: Pupils are equal, round, and reactive to light  Cardiovascular:      Rate and Rhythm: Normal rate and regular rhythm  Heart sounds: No murmur heard  No gallop  Pulmonary:      Effort: Pulmonary effort is normal  No respiratory distress  Breath sounds: No wheezing or rales  Abdominal:      General: There is no distension  Palpations: Abdomen is soft  Tenderness: There is no abdominal tenderness  Comments: Round protuberant abdomen   Musculoskeletal:      Cervical back: Neck supple  Right lower leg: No edema  Left lower leg: No edema  Skin:     General: Skin is warm and dry  Neurological:      Mental Status: He is alert and oriented to person, place, and time     Psychiatric:         Mood and Affect: Mood normal          Behavior: Behavior normal      Additional Data:     Labs:    Results from last 7 days   Lab Units 04/21/22  0507   WBC Thousand/uL 9 97   HEMOGLOBIN g/dL 12 8   HEMATOCRIT % 39 5   PLATELETS Thousands/uL 267   NEUTROS PCT % 55   LYMPHS PCT % 33   MONOS PCT % 7   EOS PCT % 4     Results from last 7 days   Lab Units 04/21/22  0507 04/20/22  2043 04/20/22  2043   SODIUM mmol/L 140   < > 137   POTASSIUM mmol/L 3 7   < > 3 5   CHLORIDE mmol/L 105   < > 100   CO2 mmol/L 26   < > 28   BUN mg/dL 12   < > 11   CREATININE mg/dL 0 99   < > 1 07   ANION GAP mmol/L 9   < > 9   CALCIUM mg/dL 8 6   < > 9 0   ALBUMIN g/dL  --   --  3 8   TOTAL BILIRUBIN mg/dL  --   --  0 38   ALK PHOS U/L  --   --  112   ALT U/L  --   --  27   AST U/L  --   --  25   GLUCOSE RANDOM mg/dL 131   < > 97    < > = values in this interval not displayed  * I Have Reviewed All Lab Data Listed Above  * Additional Pertinent Lab Tests Reviewed: All Labs Within Last 24 Hours Reviewed    Imaging:    Imaging Reports Reviewed Today Include:  CT    Recent Cultures (last 7 days):           Last 24 Hours Medication List:   Current Facility-Administered Medications   Medication Dose Route Frequency Provider Last Rate    acetaminophen  650 mg Oral Q6H PRN Franklin Stinson MD      albuterol  2 puff Inhalation Q4H PRN Franklin Stinson MD      cefTRIAXone  1,000 mg Intravenous Q24H Franklin Stinson MD 1,000 mg (04/20/22 3796)    dicyclomine  10 mg Oral 4x Daily (AC & HS) Franklin Stinson MD      enoxaparin  40 mg Subcutaneous Daily Franklin Stinson MD      metroNIDAZOLE  500 mg Oral [facility] Jose Eduardo Katz MD      nicotine  1 patch Transdermal Daily Franklin Stinson MD      ondansetron  4 mg Intravenous Q6H PRN Franklin Stinson MD      pantoprazole  40 mg Oral Early Morning Franklin Stinson MD          Today, Patient Was Seen By: Pedro Jaquez MD    ** Please Note: Dictation voice to text software may have been used in the creation of this document   **

## 2022-04-21 NOTE — H&P
[address] [facility] 1985, 43 y o  male MRN: [id number]  Unit/Bed#: ED 10 Encounter: [id number]  Primary Care Provider: KELVIN Ash   Date and time admitted to hospital: 4/20/2022  7:54 PM    * Sigmoid diverticulitis  Assessment & Plan  Had left lower quadrant abdominal pain, seen in urgent care on 03/22 and prescribed antibiotics  Reports taking antibiotics for about 5-7 days, did not complete antibiotic course  Had seen GI outpatient previously in the past, unable to obtain colonoscopy due to insurance not covering  Given IV Zosyn the ED  CT imaging shows acute sigmoid diverticulitis, severe  Add antibiotics Rocephin, Flagyl  Consult GI to evaluate  Clear liquid diet  Bentyl for abdominal cramps    Morbid obesity ([address] )  Assessment & Plan  Recommend weight loss, dietary changes and increased exercise    Asthma  Assessment & Plan  Currently not on any medications  At albuterol prn as needed    Gastroesophageal reflux disease  Assessment & Plan  Continue protonix    Current smoker  Assessment & Plan  Nicotine patch      VTE Prophylaxis: Enoxaparin (Lovenox)  / sequential compression device   Code Status: FC  POLST: There is no POLST form on file for this patient (pre-hospital)  Discussion with family: patient    Anticipated Length of Stay:  Patient will be admitted on an Inpatient basis with an anticipated length of stay of 2 midnights  Justification for Hospital Stay: IV abx, GI evaluation    Total Time for Visit, including Counseling / Coordination of Care: 45 minutes  Greater than 50% of this total time spent on direct patient counseling and coordination of care  Chief Complaint:   Abdominal Pain, Diarrhea    History of Present Illness:    Je Arce is a 43 y o  male who presents with abdominal pain and diarrhea  Patient recently seen urgent care on 03/20 to with his diagnosed with diverticulitis, and prescribe Cipro and Flagyl    He reports not taking his entire course of antibiotics, taking only several days  He has had diverticulitis in the past and was scheduled follow-up with GI outpatient for colonoscopy  He reports that and shortness has constantly denied him for colonoscopy  CT imaging done does show acute sigmoid diverticulitis  He does have slight elevated white count at 11  He denies any fevers, chills, chest pain, shortness of breath, nausea or vomiting  He does endorse diarrhea  Review of Systems:    Review of Systems   Constitutional: Negative for activity change, appetite change, chills and fever  HENT: Negative for congestion, sinus pressure and sore throat  Eyes: Negative for pain and discharge  Respiratory: Negative for cough, shortness of breath and wheezing  Cardiovascular: Negative for chest pain, palpitations and leg swelling  Gastrointestinal: Positive for abdominal pain and diarrhea  Negative for abdominal distention, blood in stool, nausea and vomiting  Endocrine: Negative for cold intolerance, heat intolerance, polydipsia, polyphagia and polyuria  Genitourinary: Negative for dysuria, frequency, hematuria and urgency  Musculoskeletal: Negative for arthralgias and back pain  Skin: Negative for color change and pallor  Neurological: Negative for seizures, syncope and weakness  Psychiatric/Behavioral: Negative for agitation and confusion         Past Medical and Surgical History:     Past Medical History:   Diagnosis Date    Abscess     Allergic     Asthma     Diverticulitis     Diverticulitis of colon     Gastroesophageal reflux disease 4/9/2020    MRSA infection     of an abscess    Obesity     [facility]        Past Surgical History:   Procedure Laterality Date    FRACTURE SURGERY      INCISION AND DRAINAGE OF WOUND Left 12/23/2016    Groin    MANDIBLE FRACTURE SURGERY         Meds/Allergies:    Prior to Admission medications Medication Sig Start Date End Date Taking? Authorizing Provider   albuterol (Ventolin HFA) 90 mcg/act inhaler Inhale 2 puffs every 6 (six) hours as needed for wheezing or shortness of breath 3/5/21   KELVIN Ku   pantoprazole (PROTONIX) 40 mg tablet Take 1 tablet (40 mg total) by mouth daily 3/5/21   KELVIN Ku     I have reviewed home medications with patient personally  Allergies: No Known Allergies    Social History:     Marital Status: Single   Occupation: Works for [employer]  Patient Pre-hospital Living Situation: home  Patient Pre-hospital Level of Mobility: ambulatory  Patient Pre-hospital Diet Restrictions: none  Substance Use History:   Social History     Substance and Sexual Activity   Alcohol Use Not Currently     Social History     Tobacco Use   Smoking Status Current Every Day Smoker    Packs/day: 0 50    Years: 15 00    Pack years: 7 50    Types: Cigarettes   Smokeless Tobacco Never Used     Social History     Substance and Sexual Activity   Drug Use Yes    Frequency: 7 0 times per week    Types: Marijuana    Comment: multiple times daily        Family History:    Family History   Problem Relation Age of Onset    Diabetes Maternal Aunt     Asthma Mother     Depression Mother     No Known Problems Sister     No Known Problems Brother     No Known Problems Sister     No Known Problems Sister     No Known Problems Brother     No Known Problems Brother     No Known Problems Brother     No Known Problems Brother     No Known Problems Brother        Physical Exam:     Vitals:   Blood Pressure: 134/76 (04/20/22 2151)  Pulse: 94 (04/20/22 2151)  Temperature: 98 7 °F (37 1 °C) (04/20/22 1948)  Temp Source: Oral (04/20/22 1948)  Respirations: 17 (04/20/22 1948)  Weight - Scale: 133 kg (292 lb 12 3 oz) (04/20/22 1948)  SpO2: 98 % (04/20/22 2151)    Physical Exam  Vitals and nursing note reviewed  Constitutional:       Appearance: Normal appearance   He is obese    HENT:      Head: Normocephalic and atraumatic  Eyes:      Conjunctiva/sclera: Conjunctivae normal       Pupils: Pupils are equal, round, and reactive to light  Cardiovascular:      Rate and Rhythm: Normal rate and regular rhythm  Heart sounds: Normal heart sounds  Pulmonary:      Breath sounds: Normal breath sounds  No wheezing or rhonchi  Abdominal:      General: Bowel sounds are normal  There is distension  Palpations: Abdomen is soft  Tenderness: There is abdominal tenderness  Musculoskeletal:         General: No swelling  Normal range of motion  Skin:     General: Skin is warm and dry  Neurological:      General: No focal deficit present  Mental Status: He is alert  Mental status is at baseline  Additional Data:     Lab Results: I have personally reviewed pertinent reports  Results from last 7 days   Lab Units 04/20/22  2043   WBC Thousand/uL 11 35*   HEMOGLOBIN g/dL 14 2   HEMATOCRIT % 43 3   PLATELETS Thousands/uL 311   NEUTROS PCT % 68   LYMPHS PCT % 23   MONOS PCT % 6   EOS PCT % 2     Results from last 7 days   Lab Units 04/20/22  2043   SODIUM mmol/L 137   POTASSIUM mmol/L 3 5   CHLORIDE mmol/L 100   CO2 mmol/L 28   BUN mg/dL 11   CREATININE mg/dL 1 07   ANION GAP mmol/L 9   CALCIUM mg/dL 9 0   ALBUMIN g/dL 3 8   TOTAL BILIRUBIN mg/dL 0 38   ALK PHOS U/L 112   ALT U/L 27   AST U/L 25   GLUCOSE RANDOM mg/dL 97                       Imaging: I have personally reviewed pertinent reports  CT abdomen pelvis with contrast   Final Result by Kiana Brannon MD (04/20 2157)      Acute severe sigmoid diverticulitis  No free air  Workstation performed: [id number]             EKG, Pathology, and Other Studies Reviewed on Admission:   · EKG: none    Allscripts / Epic Records Reviewed: Yes     ** Please Note: This note has been constructed using a voice recognition system   **

## 2022-04-21 NOTE — ASSESSMENT & PLAN NOTE
Had left lower quadrant abdominal pain, seen in urgent care on 03/22 and prescribed antibiotics  Reports taking antibiotics for about 5-7 days, did not complete antibiotic course  Had seen GI outpatient previously in the past, unable to obtain colonoscopy due to insurance not covering  Given IV Zosyn the ED  CT imaging shows acute sigmoid diverticulitis, severe  Add antibiotics Rocephin, Flagyl  Consult GI to evaluate  Clear liquid diet  Bentyl for abdominal cramps

## 2022-04-21 NOTE — UTILIZATION REVIEW
Initial Clinical Review    Admission: Date/Time/Statement:   Admission Orders (From admission, onward)     Ordered        04/20/22 2225  Inpatient Admission  Once                      Orders Placed This Encounter   Procedures    Inpatient Admission     Standing Status:   Standing     Number of Occurrences:   1     Order Specific Question:   Level of Care     Answer:   Med Surg [16]     Order Specific Question:   Estimated length of stay     Answer:   More than 2 Midnights     Order Specific Question:   Certification     Answer:   I certify that inpatient services are medically necessary for this patient for a duration of greater than two midnights  See H&P and MD Progress Notes for additional information about the patient's course of treatment  ED Arrival Information     Expected Arrival Acuity    - 4/20/2022 19:43 Urgent         Means of arrival Escorted by Service Admission type    Walk-In Self Hospitalist Urgent         Arrival complaint    abdominal pain        Chief Complaint   Patient presents with    Abdominal Pain     LLQ pain starting today, denies NVD       Initial Presentation: 43 y o  male with PMH of Asthma, GERD, morbid obesity presents to [facility] ED via personal vehicle with c/o abdominal pain and diarrhea  On 03/20 dx diverticulitis and prescribe Cipro and Flagyl  He reports not taking his entire course of antibiotics, taking only several days  He has had diverticulitis in the past and was scheduled follow-up with GI outpatient for colonoscopy  CT imaging done does show acute sigmoid diverticulitis  WBC 11   1 L IVF bolus and IV ABX given in ED  Admit Inpatient med surg d/t sigmoid diverticulitis:  Continue IV ABX, GI consult, clear liquid diet, Bentyl prn, continue home meds and inhaler prn  Date: 4/21   Day 2:  GI consult  Pt improving  Leukocytosis resolved, no fevers and VSS  Left lower quadrant pain improving, tolerating liquids    Continue ABX course, outpt colonoscopy in 6 wks, referral to outpt surgeon for recurrent diverticulitis  Advance diet to low fiber today  ED Triage Vitals   Temperature Pulse Respirations Blood Pressure SpO2   04/20/22 1948 04/20/22 1948 04/20/22 1948 04/20/22 1949 04/20/22 1948   98 7 °F (37 1 °C) (!) 118 17 167/91 98 %      Temp Source Heart Rate Source Patient Position - Orthostatic VS BP Location FiO2 (%)   04/20/22 1948 04/20/22 1948 04/20/22 2151 04/20/22 2151 --   Oral Monitor Lying Left arm       Pain Score       04/20/22 1948       10 - Worst Possible Pain          Wt Readings from Last 1 Encounters:   04/20/22 133 kg (292 lb 12 3 oz)     Additional Vital Signs:   Date/Time Temp Pulse Resp BP MAP (mmHg) SpO2 O2 Device Patient Position - Orthostatic VS   04/21/22 0855 -- -- -- -- -- -- None (Room air) --   04/21/22 07:25:52 98 2 °F (36 8 °C) 75 16 117/73 88 96 % -- --   04/20/22 23:08:44 -- 103 18 145/103 Abnormal  117 96 % None (Room air) Sitting   04/20/22 2300 98 9 °F (37 2 °C) -- -- -- -- 94 % None (Room air) --   04/20/22 2151 -- 94 -- 134/76 -- 98 % None (Room air) Lying       Pertinent Labs/Diagnostic Test Results:   CT abdomen pelvis with contrast   Final Result by Le Dyer MD (04/20 2157)      Acute severe sigmoid diverticulitis  No free air       Results from last 7 days   Lab Units 04/21/22  0507 04/20/22  2043   WBC Thousand/uL 9 97 11 35*   HEMOGLOBIN g/dL 12 8 14 2   HEMATOCRIT % 39 5 43 3   PLATELETS Thousands/uL 267 311   NEUTROS ABS Thousands/µL 5 60 7 76*     Results from last 7 days   Lab Units 04/21/22  0507 04/20/22  2043   SODIUM mmol/L 140 137   POTASSIUM mmol/L 3 7 3 5   CHLORIDE mmol/L 105 100   CO2 mmol/L 26 28   ANION GAP mmol/L 9 9   BUN mg/dL 12 11   CREATININE mg/dL 0 99 1 07   EGFR ml/min/1 73sq m 96 87   CALCIUM mg/dL 8 6 9 0     Results from last 7 days   Lab Units 04/20/22  2043   AST U/L 25   ALT U/L 27   ALK PHOS U/L 112   TOTAL PROTEIN g/dL 8 7*   ALBUMIN g/dL 3 8   TOTAL BILIRUBIN mg/dL 0 38     Results from last 7 days   Lab Units 04/21/22  0507 04/20/22  2043   GLUCOSE RANDOM mg/dL 131 97       Results from last 7 days   Lab Units 04/20/22  2120   CLARITY UA  Clear   COLOR UA  Yellow   SPEC GRAV UA  >=1 030   PH UA  5 5   GLUCOSE UA mg/dl Negative   KETONES UA mg/dl Negative   BLOOD UA  Negative   PROTEIN UA mg/dl Negative   NITRITE UA  Negative   BILIRUBIN UA  Negative   UROBILINOGEN UA E U /dl 0 2   LEUKOCYTES UA  Trace*   WBC UA /hpf 0-1*   RBC UA /hpf None Seen   BACTERIA UA /hpf Occasional   EPITHELIAL CELLS WET PREP /hpf Occasional     ED Treatment:   Medication Administration from 04/20/2022 1943 to 04/20/2022 2310       Date/Time Order Dose Route Action     04/20/2022 2041 sodium chloride 0 9 % bolus 1,000 mL 1,000 mL Intravenous New Bag     04/20/2022 2120 iohexol (OMNIPAQUE) 350 MG/ML injection (SINGLE-DOSE) 100 mL 100 mL Intravenous Given     04/20/2022 2223 piperacillin-tazobactam (ZOSYN) IVPB 3 375 g 3 375 g Intravenous New Bag     04/20/2022 2300 ketorolac (TORADOL) injection 15 mg 15 mg Intravenous Given     04/20/2022 2256 metroNIDAZOLE (FLAGYL) tablet 500 mg 500 mg Oral Given     04/20/2022 2256 dicyclomine (BENTYL) capsule 10 mg 10 mg Oral Given        Past Medical History:   Diagnosis Date    Abscess     Allergic     Asthma     Diverticulitis     Diverticulitis of colon     Gastroesophageal reflux disease 4/9/2020    MRSA infection     of an abscess    Obesity     SIRS (systemic inflammatory response syndrome) (HCC)      Present on Admission:   Current smoker   Asthma   Morbid obesity (HCC)   Gastroesophageal reflux disease      Admitting Diagnosis: Diverticulitis [K57 92]  Abdominal pain [R10 9]  Age/Sex: 43 y o  male  Admission Orders:  Scheduled Medications:  cefTRIAXone, 1,000 mg, Intravenous, Q24H  dicyclomine, 10 mg, Oral, 4x Daily (AC & HS)  enoxaparin, 40 mg, Subcutaneous, Daily  metroNIDAZOLE, 500 mg, Oral, Q8H [facility]  nicotine, 1 patch, Transdermal, Daily  pantoprazole, 40 mg, Oral, Early Morning      Continuous IV Infusions: none     PRN Meds:  acetaminophen, 650 mg, Oral, Q6H PRN  albuterol, 2 puff, Inhalation, Q4H PRN  ondansetron, 4 mg, Intravenous, Q6H PRN      scd    IP CONSULT TO GASTROENTEROLOGY    Network Utilization Review Department  ATTENTION: Please call with any questions or concerns to [phone] and carefully listen to the prompts so that you are directed to the right person  All voicemails are confidential   [place] all requests for admission clinical reviews, approved or denied determinations and any other requests to dedicated fax number below belonging to the campus where the patient is receiving treatment   List of dedicated fax numbers for the Facilities:  [address] DENIALS (Administrative/Medical Necessity) [phone]   [address] (Maternity/NICU/Pediatrics) [phone]   [address]  [address] Medical [place] [address] [address] [place] [address] Mariela Toan [address] [place] [address] [phone]

## 2022-04-21 NOTE — CONSULTS
Consultation - 126 [facility]  Shana Pratt 43 y o  male MRN: [id number]  Unit/Bed#: [address] 208-01 Encounter: [id number]        Inpatient consult to gastroenterology  Consult performed by: Jenn Alexandre PA-C  Consult ordered by: Jhonny Taveras MD        ASSESSMENT and PLAN:       41-year-old obese male admitted with recurrent sigmoid diverticulitis  1) Recurrent sigmoid diverticulitis:  CT shows acute severe sigmoid diverticulitis without free air or abscess  He had mild leukocytosis on admission which is currently resolved  No fevers and vital signs are stable  His left lower quadrant pain is improving  He is tolerating liquids  He only has mild LLQ tenderness on physical exam     -continue IV antibiotics, transition to oral antibiotics upon discharge to complete 14 day course  -may advance to low residue diet if tolerating liquids  -we would recommend outpatient colonoscopy in 6-8 weeks once acute inflammation resolves to evaluate for polyps and rule out malignancy  -our office will arrange outpatient GI follow-up upon discharge    Patient was seen and examined by Dr Jerel Manuel  All gandara medical decisions were made by Dr Jerel Manuel  Thank you for allowing us to participate in the care of this present patient  We will follow-up with you closely  Reason for Consult / Principal Problem:  Diverticulitis    HPI:  41-year-old obese male admitted with recurrent diverticulitis  Patient has had diverticulitis several times in the past  He recalls being admitted to the hospital about 2 years ago with diverticulitis  He denies history of abscess or complication  He was seen at an urgent care on 3/22/22 for left lower quadrant pain and was prescribed Cipro and Flagyl for treatment of diverticulitis  He did not take the antibiotics initially and tried to manage his symptoms with diet  Once he tried to eat solid food, his pain worsened   He started the antibiotics but only took a few days worth  Due to persistent pain, he came to the ED  He had mild leukocytosis and CT scan showed severe sigmoid diverticulitis without free air or abscess  He is currently on IV antibiotics and his pain has essentially resolved  He denies fever, chills, nausea, vomiting  He did have an episode of diarrhea yesterday but none today  He has never had a colonoscopy  He was recommended colonoscopy after his last episode of diverticulitis however his insurance did not cover  REVIEW OF SYSTEMS:    CONSTITUTIONAL: Denies any fever, chills, or rigors  Good appetite, and no recent weight loss  HEENT: No earache or tinnitus  Denies hearing loss or visual disturbances  CARDIOVASCULAR: No chest pain or palpitations  RESPIRATORY: Denies any cough, hemoptysis, shortness of breath or dyspnea on exertion  GASTROINTESTINAL: As noted in the History of Present Illness  GENITOURINARY: No problems with urination  Denies any hematuria or dysuria  NEUROLOGIC: No dizziness or vertigo, denies headaches  MUSCULOSKELETAL: Denies any muscle or joint pain  SKIN: Denies skin rashes or itching  ENDOCRINE: Denies excessive thirst  Denies intolerance to heat or cold  PSYCHOSOCIAL: Denies depression or anxiety  Denies any recent memory loss         Historical Information   Past Medical History:   Diagnosis Date    Abscess     Allergic     Asthma     Diverticulitis     Diverticulitis of colon     Gastroesophageal reflux disease 4/9/2020    MRSA infection     of an abscess    Obesity     SIRS (systemic inflammatory response syndrome) ([facility] )      Past Surgical History:   Procedure Laterality Date    FRACTURE SURGERY      INCISION AND DRAINAGE OF WOUND Left 12/23/2016    Groin    MANDIBLE FRACTURE SURGERY       Social History   Social History     Substance and Sexual Activity   Alcohol Use Not Currently     Social History     Substance and Sexual Activity   Drug Use Not Currently    Frequency: 7 0 times per week    Types: Marijuana    Comment: stopped using, used 2 months ago (feb)     Social History     Tobacco Use   Smoking Status Current Every Day Smoker    Packs/day: 0 50    Years: 15 00    Pack years: 7 50    Types: Cigarettes   Smokeless Tobacco Never Used     Family History   Problem Relation Age of Onset    Diabetes Maternal Aunt     Asthma Mother     Depression Mother     No Known Problems Sister     No Known Problems Brother     No Known Problems Sister     No Known Problems Sister     No Known Problems Brother     No Known Problems Brother     No Known Problems Brother     No Known Problems Brother     No Known Problems Brother        Meds/Allergies     Medications Prior to Admission   Medication    albuterol (Ventolin HFA) 90 mcg/act inhaler    pantoprazole (PROTONIX) 40 mg tablet     Current Facility-Administered Medications   Medication Dose Route Frequency    acetaminophen (TYLENOL) tablet 650 mg  650 mg Oral Q6H PRN    albuterol (PROVENTIL HFA,VENTOLIN HFA) inhaler 2 puff  2 puff Inhalation Q4H PRN    ceftriaxone (ROCEPHIN) 1 g/50 mL in dextrose IVPB  1,000 mg Intravenous Q24H    dicyclomine (BENTYL) capsule 10 mg  10 mg Oral 4x Daily (AC & HS)    enoxaparin (LOVENOX) subcutaneous injection 40 mg  40 mg Subcutaneous Daily    metroNIDAZOLE (FLAGYL) tablet 500 mg  500 mg Oral Q8H [address]    nicotine (NICODERM CQ) 14 mg/24hr TD 24 hr patch 1 patch  1 patch Transdermal Daily    ondansetron (ZOFRAN) injection 4 mg  4 mg Intravenous Q6H PRN    pantoprazole (PROTONIX) EC tablet 40 mg  40 mg Oral Early Morning       No Known Allergies        Objective     Blood pressure 117/73, pulse 75, temperature 98 2 °F (36 8 °C), resp  rate 16, weight 133 kg (292 lb 12 3 oz), SpO2 96 %        Intake/Output Summary (Last 24 hours) at 4/21/2022 1046  Last data filed at 4/20/2022 2302  Gross per 24 hour   Intake 100 ml   Output --   Net 100 ml         PHYSICAL EXAM:      General Appearance:   Alert, cooperative, no distress, appears stated age    HEENT:   Normocephalic, atraumatic, anicteric      Neck:  Supple, symmetrical, trachea midline, no adenopathy   Lungs:   Clear to auscultation bilaterally   Heart[de-identified]   S1 and S2 normal; regular rate and rhythm   Abdomen:   Soft, mild LLQ tenderness, non-distended; normal bowel sounds; no masses, no organomegaly    Genitalia:   Deferred    Rectal:   Deferred    Extremities:  No cyanosis, clubbing or edema    Pulses:  2+ and symmetric all extremities    Skin:  Skin color, texture, turgor normal, no rashes or lesions    Lymph nodes:  No palpable cervical lymphadenopathy        Lab Results:   Results from last 7 days   Lab Units 04/21/22  0507   WBC Thousand/uL 9 97   HEMOGLOBIN g/dL 12 8   HEMATOCRIT % 39 5   PLATELETS Thousands/uL 267   NEUTROS PCT % 55   LYMPHS PCT % 33   MONOS PCT % 7   EOS PCT % 4     Results from last 7 days   Lab Units 04/21/22  0507 04/20/22  2043 04/20/22  2043   POTASSIUM mmol/L 3 7   < > 3 5   CHLORIDE mmol/L 105   < > 100   CO2 mmol/L 26   < > 28   BUN mg/dL 12   < > 11   CREATININE mg/dL 0 99   < > 1 07   CALCIUM mg/dL 8 6   < > 9 0   ALK PHOS U/L  --   --  112   ALT U/L  --   --  27   AST U/L  --   --  25    < > = values in this interval not displayed  Imaging Studies: I have personally reviewed pertinent imaging studies  CT abdomen pelvis with contrast    Result Date: 4/20/2022  Impression: Acute severe sigmoid diverticulitis  No free air   Workstation performed: [id number]

## 2022-04-22 VITALS
BODY MASS INDEX: 40.83 KG/M2 | DIASTOLIC BLOOD PRESSURE: 78 MMHG | OXYGEN SATURATION: 95 % | TEMPERATURE: 98.3 F | HEART RATE: 69 BPM | WEIGHT: 292.77 LBS | SYSTOLIC BLOOD PRESSURE: 116 MMHG | RESPIRATION RATE: 16 BRPM

## 2022-04-22 LAB
ANION GAP SERPL CALCULATED.3IONS-SCNC: 8 MMOL/L (ref 4–13)
BASOPHILS # BLD AUTO: 0.07 THOUSANDS/ΜL (ref 0–0.1)
BASOPHILS NFR BLD AUTO: 1 % (ref 0–1)
BUN SERPL-MCNC: 9 MG/DL (ref 5–25)
CALCIUM SERPL-MCNC: 8.7 MG/DL (ref 8.3–10.1)
CHLORIDE SERPL-SCNC: 106 MMOL/L (ref 100–108)
CO2 SERPL-SCNC: 26 MMOL/L (ref 21–32)
CREAT SERPL-MCNC: 0.97 MG/DL (ref 0.6–1.3)
EOSINOPHIL # BLD AUTO: 0.37 THOUSAND/ΜL (ref 0–0.61)
EOSINOPHIL NFR BLD AUTO: 5 % (ref 0–6)
ERYTHROCYTE [DISTWIDTH] IN BLOOD BY AUTOMATED COUNT: 12.7 % (ref 11.6–15.1)
GFR SERPL CREATININE-BSD FRML MDRD: 98 ML/MIN/1.73SQ M
GLUCOSE SERPL-MCNC: 118 MG/DL (ref 65–140)
HCT VFR BLD AUTO: 41.2 % (ref 36.5–49.3)
HGB BLD-MCNC: 13.2 G/DL (ref 12–17)
IMM GRANULOCYTES # BLD AUTO: 0.02 THOUSAND/UL (ref 0–0.2)
IMM GRANULOCYTES NFR BLD AUTO: 0 % (ref 0–2)
LYMPHOCYTES # BLD AUTO: 2.5 THOUSANDS/ΜL (ref 0.6–4.47)
LYMPHOCYTES NFR BLD AUTO: 30 % (ref 14–44)
MCH RBC QN AUTO: 28.4 PG (ref 26.8–34.3)
MCHC RBC AUTO-ENTMCNC: 32 G/DL (ref 31.4–37.4)
MCV RBC AUTO: 89 FL (ref 82–98)
MONOCYTES # BLD AUTO: 0.52 THOUSAND/ΜL (ref 0.17–1.22)
MONOCYTES NFR BLD AUTO: 6 % (ref 4–12)
NEUTROPHILS # BLD AUTO: 4.78 THOUSANDS/ΜL (ref 1.85–7.62)
NEUTS SEG NFR BLD AUTO: 58 % (ref 43–75)
NRBC BLD AUTO-RTO: 0 /100 WBCS
PLATELET # BLD AUTO: 290 THOUSANDS/UL (ref 149–390)
PMV BLD AUTO: 9.4 FL (ref 8.9–12.7)
POTASSIUM SERPL-SCNC: 3.7 MMOL/L (ref 3.5–5.3)
RBC # BLD AUTO: 4.65 MILLION/UL (ref 3.88–5.62)
SODIUM SERPL-SCNC: 140 MMOL/L (ref 136–145)
WBC # BLD AUTO: 8.26 THOUSAND/UL (ref 4.31–10.16)

## 2022-04-22 PROCEDURE — 80048 BASIC METABOLIC PNL TOTAL CA: CPT | Performed by: INTERNAL MEDICINE

## 2022-04-22 PROCEDURE — 85025 COMPLETE CBC W/AUTO DIFF WBC: CPT | Performed by: INTERNAL MEDICINE

## 2022-04-22 PROCEDURE — 99239 HOSP IP/OBS DSCHRG MGMT >30: CPT | Performed by: INTERNAL MEDICINE

## 2022-04-22 RX ORDER — CEFDINIR 300 MG/1
300 CAPSULE ORAL EVERY 12 HOURS SCHEDULED
Qty: 24 CAPSULE | Refills: 0 | Status: SHIPPED | OUTPATIENT
Start: 2022-04-22 | End: 2022-05-04

## 2022-04-22 RX ORDER — METRONIDAZOLE 500 MG/1
500 TABLET ORAL EVERY 8 HOURS SCHEDULED
Qty: 36 TABLET | Refills: 0 | Status: SHIPPED | OUTPATIENT
Start: 2022-04-22 | End: 2022-05-04

## 2022-04-22 RX ADMIN — METRONIDAZOLE 500 MG: 500 TABLET ORAL at 05:59

## 2022-04-22 RX ADMIN — Medication 1 PATCH: at 09:14

## 2022-04-22 RX ADMIN — ENOXAPARIN SODIUM 40 MG: 40 INJECTION SUBCUTANEOUS at 09:14

## 2022-04-22 RX ADMIN — DICYCLOMINE HYDROCHLORIDE 10 MG: 10 CAPSULE ORAL at 06:00

## 2022-04-22 RX ADMIN — PANTOPRAZOLE SODIUM 40 MG: 40 TABLET, DELAYED RELEASE ORAL at 05:58

## 2022-04-22 NOTE — DISCHARGE SUMMARY
69 [facility] 1985, 43 y o  male MRN: [id number]  Unit/Bed#: [facility] 68 2 -01 Encounter: [id number]  Primary Care Provider: KELVIN Berrios   Date and time admitted to hospital: 4/20/2022  7:54 PM    * Sigmoid diverticulitis  Assessment & Plan  Recurrent diverticulits without perforation or abscess  Did well overnight with improved abdominal pain, no fever for and no leukocytosis  Tolerated low-fat low residue diet today  Discussed with GI  Stable for discharge home today  Will DC on cefdinir 300 mg b i d  And Flagyl 500 mg Q 8 for 12 more days  Outpatient follow-up with GI for elective colonoscopy and surgery for possible colon resection    Morbid obesity ([address] )  Assessment & Plan  BMI 40  Asthma  Assessment & Plan  Intermittent asthma without exacerbation    Gastroesophageal reflux disease  Assessment & Plan  Continue protonix daily    Current smoker  Assessment & Plan  Continue nicotine patch      Discharging Physician / Practitioner: Declan Pro MD  PCP: Nahum Yeboah, [address] [place] [address]  Admission Date:   Admission Orders (From admission, onward)     Ordered        04/20/22 2225  Inpatient Admission  Once                      Discharge Date: 04/22/22    Medical Problems             Resolved Problems  Date Reviewed: 4/22/2022    None                Consultations During Hospital Stay:  · GI    Procedures Performed:   · None    Significant Findings / Test Results:   · CT abdomen-severe sigmoid diverticulitis without perforation or abscess    Incidental Findings:   · None     Test Results Pending at Discharge (will require follow up): · None     Outpatient Tests Requested:  · None    Complications:  None    Reason for Admission:  Abdominal pain    Hospital Course:     Marah Pierson is a 43 y o  male patient who originally presented to the hospital on 4/20/2022 due to abdominal pain and diarrhea    He was admitted for severe sigmoid diverticulitis  He was treated with IV ceftriaxone and IV metronidazole  He tolerated the antibiotics without any complications  Diet was advanced from clear liquid to low-fat which he tolerated  He was evaluated by GI given recurrent diverticulitis  He will follow-up with them in the office for elective colonoscopy and then surgical referral regarding possible colon resection  In the meantime he is stable for discharge home on oral antibiotics  Please see above list of diagnoses and related plan for additional information  Condition at Discharge: good     Discharge Day Visit / Exam:     Subjective: Tolerated his solid diet this morning without nausea or vomiting  He had no fever or chills overnight  Feels ready to go home  Vitals: Blood Pressure: 116/78 (04/22/22 0744)  Pulse: 69 (04/22/22 0744)  Temperature: 98 3 °F (36 8 °C) (04/22/22 0744)  Temp Source: Oral (04/20/22 2300)  Respirations: 16 (04/22/22 0744)  Weight - Scale: 133 kg (292 lb 12 3 oz) (04/20/22 1948)  SpO2: 95 % (04/22/22 0744)  Exam:   Physical Exam  Vitals reviewed  Constitutional:       General: He is not in acute distress  Appearance: Normal appearance  He is obese  He is not ill-appearing, toxic-appearing or diaphoretic  HENT:      Head: Normocephalic and atraumatic  Nose: No rhinorrhea  Eyes:      General: No scleral icterus  Cardiovascular:      Rate and Rhythm: Regular rhythm  Heart sounds: No murmur heard  No gallop  Pulmonary:      Effort: Pulmonary effort is normal  No respiratory distress  Breath sounds: No wheezing or rales  Abdominal:      General: There is no distension  Palpations: Abdomen is soft  Tenderness: There is no abdominal tenderness  Comments: Large protuberant abdomen   Musculoskeletal:      Cervical back: Neck supple  Right lower leg: No edema  Left lower leg: No edema  Skin:     General: Skin is warm and dry        Coloration: Skin is not jaundiced or pale  Neurological:      Mental Status: He is alert and oriented to person, place, and time  Psychiatric:         Mood and Affect: Mood normal          Behavior: Behavior normal        Discharge instructions/Information to patient and family:   See after visit summary for information provided to patient and family  Provisions for Follow-Up Care:  See after visit summary for information related to follow-up care and any pertinent home health orders  Disposition:     Home    Planned Readmission: no     Discharge Statement:  I spent >30 minutes discharging the patient  This time was spent on the day of discharge  I had direct contact with the patient on the day of discharge  Greater than 50% of the total time was spent examining patient, answering all patient questions, arranging and discussing plan of care with patient as well as directly providing post-discharge instructions  Additional time then spent on discharge activities  Discharge Medications:  See after visit summary for reconciled discharge medications provided to patient and family        ** Please Note: This note has been constructed using a voice recognition system **

## 2022-04-22 NOTE — PLAN OF CARE
Problem: INFECTION - ADULT  Goal: Absence or prevention of progression during hospitalization  Description: INTERVENTIONS:  - Assess and monitor for signs and symptoms of infection  - Monitor lab/diagnostic results  - Monitor all insertion sites, i e  indwelling lines, tubes, and drains  - Monitor endotracheal if appropriate and nasal secretions for changes in amount and color  - [place] appropriate cooling/warming therapies per order  - Administer medications as ordered  - Instruct and encourage patient and family to use good hand hygiene technique  - Identify and instruct in appropriate isolation precautions for identified infection/condition  Outcome: Progressing  Goal: Absence of fever/infection during neutropenic period  Description: INTERVENTIONS:  - Monitor WBC    Outcome: Progressing     Problem: DISCHARGE PLANNING  Goal: Discharge to home or other facility with appropriate resources  Description: INTERVENTIONS:  - Identify barriers to discharge w/patient and caregiver  - Arrange for needed discharge resources and transportation as appropriate  - Identify discharge learning needs (meds, wound care, etc )  - Arrange for interpretive services to assist at discharge as needed  - Refer to Case Management Department for coordinating discharge planning if the patient needs post-hospital services based on physician/advanced practitioner order or complex needs related to functional status, cognitive ability, or social support system  Outcome: Progressing     Problem: PAIN - ADULT  Goal: Verbalizes/displays adequate comfort level or baseline comfort level  Description: Interventions:  - Encourage patient to monitor pain and request assistance  - Assess pain using appropriate pain scale  - Administer analgesics based on type and severity of pain and evaluate response  - Implement non-pharmacological measures as appropriate and evaluate response  - Consider cultural and social influences on pain and pain management  - Notify physician/advanced practitioner if interventions unsuccessful or patient reports new pain  Outcome: Progressing

## 2022-04-22 NOTE — ASSESSMENT & PLAN NOTE
Recurrent diverticulits without perforation or abscess  Did well overnight with improved abdominal pain, no fever for and no leukocytosis  Tolerated low-fat low residue diet today  Discussed with GI  Stable for discharge home today  Will DC on cefdinir 300 mg b i d   And Flagyl 500 mg Q 8 for 12 more days  Outpatient follow-up with GI for elective colonoscopy and surgery for possible colon resection

## 2022-04-25 ENCOUNTER — TRANSITIONAL CARE MANAGEMENT (OUTPATIENT)
Dept: FAMILY MEDICINE CLINIC | Facility: CLINIC | Age: 37
End: 2022-04-25

## 2022-05-02 ENCOUNTER — TELEPHONE (OUTPATIENT)
Dept: GASTROENTEROLOGY | Facility: MEDICAL CENTER | Age: 37
End: 2022-05-02

## 2022-05-02 NOTE — TELEPHONE ENCOUNTER
----- Message from Maninder Lemos PA-C sent at 4/25/2022  4:59 PM EDT -----  Please schedule hospital follow-up in 2 weeks for diverticulitis

## 2022-05-04 ENCOUNTER — OFFICE VISIT (OUTPATIENT)
Dept: FAMILY MEDICINE CLINIC | Facility: CLINIC | Age: 37
End: 2022-05-04

## 2022-05-04 VITALS
HEIGHT: 71 IN | BODY MASS INDEX: 41.58 KG/M2 | WEIGHT: 297 LBS | TEMPERATURE: 97.8 F | DIASTOLIC BLOOD PRESSURE: 80 MMHG | SYSTOLIC BLOOD PRESSURE: 124 MMHG | RESPIRATION RATE: 16 BRPM | HEART RATE: 113 BPM | OXYGEN SATURATION: 99 %

## 2022-05-04 DIAGNOSIS — J45.20 MILD INTERMITTENT ASTHMA WITHOUT COMPLICATION: ICD-10-CM

## 2022-05-04 DIAGNOSIS — F17.200 TOBACCO DEPENDENCY: ICD-10-CM

## 2022-05-04 DIAGNOSIS — K21.9 GASTROESOPHAGEAL REFLUX DISEASE, UNSPECIFIED WHETHER ESOPHAGITIS PRESENT: ICD-10-CM

## 2022-05-04 DIAGNOSIS — K57.32 SIGMOID DIVERTICULITIS: Primary | ICD-10-CM

## 2022-05-04 PROCEDURE — 99495 TRANSJ CARE MGMT MOD F2F 14D: CPT

## 2022-05-04 RX ORDER — PANTOPRAZOLE SODIUM 40 MG/1
40 TABLET, DELAYED RELEASE ORAL DAILY
Qty: 90 TABLET | Refills: 1 | Status: SHIPPED | OUTPATIENT
Start: 2022-05-04

## 2022-05-04 RX ORDER — ALBUTEROL SULFATE 90 UG/1
2 AEROSOL, METERED RESPIRATORY (INHALATION) EVERY 6 HOURS PRN
Qty: 18 G | Refills: 0 | Status: SHIPPED | OUTPATIENT
Start: 2022-05-04 | End: 2022-05-26

## 2022-05-04 RX ORDER — BUPROPION HYDROCHLORIDE 150 MG/1
150 TABLET, EXTENDED RELEASE ORAL 2 TIMES DAILY
Qty: 60 TABLET | Refills: 1 | Status: SHIPPED | OUTPATIENT
Start: 2022-05-04 | End: 2022-07-06

## 2022-05-04 NOTE — ASSESSMENT & PLAN NOTE
Pt stopped taking Protonix during diverticulitis flare  Would like to resume medication once finished with antibiotics  Refill sent

## 2022-05-04 NOTE — PROGRESS NOTES
TCM Call (since 4/3/2022)     Date and time call was made  4/25/2022 11:36 AM    Hospital care reviewed  Records reviewed        Patient was hospitialized at  [facility]        Date of Admission  04/20/22    Date of discharge  04/22/22    Diagnosis  Sigmoid diverticulitis    Disposition  Home    Current Symptoms  None      TCM Call (since 4/3/2022)     Post hospital issues  None    I have advised the patient to call PCP with any new or worsening symptoms  CALEB KUMAR        Assessment/Plan:    Tobacco dependency  Patient states he is ready to quit smoking  Currently smoking 0 5 ppd, most recently quit for 1 week but resumed smoking a few days ago  Has tried nicotine patches and gum in past, does not want another nicotine replacement product  - Wellbutrin  mg BID, pt to start after finishing outpt antibiotics for diverticulitis  - Pt agreed to choose a quit date 1 to 2 weeks after starting Wellbutrin  - Discussed making the necessary changes to routine and lifestyle to promote success with smoking cessation    - Follow up with PCP in 6 weeks  Sigmoid diverticulitis  Pain and symptoms resolved at this time  - Reviewed and strongly recommended appropriate diet to avoid future flares  - Recommended exercise for weight loss  - Wellbutrin for smoking cessation    - Finish outpatient antibiotics  - Keep follow up appt with GI scheduled for 5/10/22 to obtain colonoscopy and discuss need for bowel resection  Gastroesophageal reflux disease  Pt stopped taking Protonix during diverticulitis flare  Would like to resume medication once finished with antibiotics  Refill sent  Mild intermittent asthma without complication  Symptoms stable  SOB only rarely with physical exertion or exposure to pollen  Requested refill albuterol  Refill sent  Strongly encouraged smoking cessation         Diagnoses and all orders for this visit:    Sigmoid diverticulitis    Gastroesophageal reflux disease, unspecified whether esophagitis present  -     pantoprazole (PROTONIX) 40 mg tablet; Take 1 tablet (40 mg total) by mouth daily    Tobacco dependency  -     buPROPion (WELLBUTRIN SR) 150 mg 12 hr tablet; Take 1 tablet (150 mg total) by mouth 2 (two) times a day    Mild intermittent asthma without complication  -     albuterol (Ventolin HFA) 90 mcg/act inhaler; Inhale 2 puffs every 6 (six) hours as needed for wheezing or shortness of breath          Subjective:      Patient ID: Shana Pratt is a 43 y o  male  [facility]     Frederick Ware presented to the office to follow up after hospitalization from 4/20 through 4/22/22 for sigmoid diverticulitis  Pt states he has been dealing with diverticulitis for about 3 years and has been seen in the ED multiple times for same  Most recent admission is second hospitalization for diverticulitis, presented to ED due to "pain like electricity" in his lower abdomen  Pt states he was unable to follow up and obtain colonoscopy in past due to insurance restrictions  Follow up with GI is scheduled for 5/10/2022, pt intends to keep appointment and pursue colonoscopy or colon resection if recommended  Pt reports pain has resolved, afebrile, no lingering symptoms  Pt reports that he typically has a formed BM shortly after every meal  He has missed several doses of the antibiotics prescribed after hospital discharge but intends to complete them  Pt aware of triggers for diverticulitis flares but has been unable to make the necessary lifestyle changes  Patient consumes zero servings of vegetables per day, only corn occasionally  Eats some fruits  Trying to avoid red meat  Patient is considering Parthasuly Connelly for diverticulitis symptoms and purchased fiber gummies  Current everyday smoker 0 5 ppd, states he recently quit for 1 week but resumed smoking, states he is ready to quit with assistance and is accepting of medication to help   Has tried nicotine gum and patches in the past which were ineffective and caused side effects  The following portions of the patient's history were reviewed and updated as appropriate: allergies, current medications, past family history, past medical history, past social history, past surgical history and problem list     Review of Systems   Constitutional: Negative for appetite change, chills, fever and unexpected weight change  Respiratory: Positive for shortness of breath (rarely, with exertion or exposure to environmental allergens)  Negative for cough and wheezing  Cardiovascular: Negative for chest pain and palpitations  Gastrointestinal: Positive for abdominal pain (resolved) and diarrhea (formed but frequent stools)  Negative for abdominal distention, blood in stool, constipation, nausea and vomiting  Genitourinary: Negative for difficulty urinating  Musculoskeletal: Positive for back pain  Allergic/Immunologic: Positive for environmental allergies  Neurological: Negative  Psychiatric/Behavioral: Negative  Objective:      /80 (BP Location: Left arm, Patient Position: Sitting, Cuff Size: Large)   Pulse (!) 113   Temp 97 8 °F (36 6 °C) (Temporal)   Resp 16   Ht 5' 11" (1 803 m)   Wt 135 kg (297 lb)   SpO2 99%   BMI 41 42 kg/m²          Physical Exam  Constitutional:       General: He is not in acute distress  Appearance: He is obese  He is not ill-appearing  HENT:      Head: Normocephalic and atraumatic  Cardiovascular:      Rate and Rhythm: Normal rate and regular rhythm  Heart sounds: Normal heart sounds  Pulmonary:      Effort: Pulmonary effort is normal       Breath sounds: Normal breath sounds  No wheezing  Abdominal:      General: Abdomen is protuberant  Bowel sounds are normal  There is no distension  Palpations: Abdomen is soft  Tenderness: There is abdominal tenderness (very mild) in the right lower quadrant and left lower quadrant  There is no guarding or rebound     Skin:     General: Skin is warm and dry    Neurological:      Mental Status: He is alert and oriented to person, place, and time     Psychiatric:         Mood and Affect: Mood and affect normal

## 2022-05-04 NOTE — ASSESSMENT & PLAN NOTE
Pain and symptoms resolved at this time  - Reviewed and strongly recommended appropriate diet to avoid future flares  - Recommended exercise for weight loss  - Wellbutrin for smoking cessation    - Finish outpatient antibiotics  - Keep follow up appt with GI scheduled for 5/10/22 to obtain colonoscopy and discuss need for bowel resection

## 2022-05-04 NOTE — PATIENT INSTRUCTIONS
Bupropion (By mouth)  Wellbutrin  Bupropion ([id number])  Treats depression and aids in quitting smoking  Also prevents depression caused by seasonal affective disorder (SAD)  Brand Name(s): Aplenzin, Forfivo XL, Wellbutrin, Wellbutrin SR, Wellbutrin XL   There may be other brand names for this medicine  When This Medicine Should Not Be Used: This medicine is not right for everyone  Do not use it if you had an allergic reaction to bupropion, or if you have seizures, anorexia, or bulimia  How to Use This Medicine:   Tablet, Long Acting Tablet  · Take your medicine as directed  Your dose may need to be changed several times to find what works best for you  · You may need to take Wellbutrin® for up to 4 weeks before you feel better  You may need to take Zyban® for 1 to 2 weeks before the date that you plan to stop smoking  · Swallow the extended-release tablet whole  Do not crush, break, or chew it  · It is best to take Aplenzin® in the morning  · Do not take Wellbutrin® or Zyban® close to bedtime if you have trouble sleeping  · Take it with food if it upsets your stomach or if you have nausea  · If you take the extended-release tablet, part of the tablet may pass into your stools  This is normal and is nothing to worry about  · This medicine should come with a Medication Guide  Ask your pharmacist for a copy if you do not have one  · Missed dose: Skip the missed dose and go back to your regular dosing schedule  Never take extra medicine to make up for a missed dose  · Store the medicine in a closed container at room temperature, away from heat, moisture, and direct light  Drugs and Foods to Avoid:   Ask your doctor or pharmacist before using any other medicine, including over-the-counter medicines, vitamins, and herbal products  · Do not use this medicine and an MAO inhibitor (MAOI) within 14 days of each other   Do not use Zyban® to quit smoking if you already take Aplenzin® or Wellbutrin® for depression, because they are the same medicine  · Tell your doctor if you take barbiturates, benzodiazepines, antiseizure medicine, or sedatives, or if you recently stopped taking them  · Some medicines can affect how bupropion works  Tell your doctor if you use any of the following:   ? Amantadine, carbamazepine, cimetidine, clopidogrel, cyclophosphamide, digoxin, efavirenz, levodopa, lopinavir, nelfinavir, nicotine patch, orphenadrine, phenobarbital, phenytoin, ritonavir, tamoxifen, theophylline, thiotepa, ticlopidine  ? Beta blocker medicine (including metoprolol)  ? A blood thinner (including warfarin)  ? Insulin or diabetes medicine  ? Medicine to treat depression (including desipramine, fluoxetine, imipramine, nortriptyline, paroxetine, sertraline, venlafaxine)  ? Medicine to treat heart rhythm problems (including flecainide, propafenone)  ? Medicine to treat mental illness (including haloperidol, risperidone, thioridazine)  ? Steroid medicine (including dexamethasone, hydrocortisone, methylprednisolone, prednisolone, prednisone)  · Do not drink alcohol while you are using this medicine  · Tell your doctor if you use anything else that makes you sleepy  Some examples are allergy medicine, narcotic pain medicine, and alcohol  Warnings While Using This Medicine:   · Tell your doctor if you are pregnant or breastfeeding, or if you have kidney disease, liver disease, heart disease, diabetes, glaucoma, mental illness (including bipolar disorder), or high blood pressure  Tell your doctor if you have a history of drug addiction or if you drink alcohol  · For some children, teenagers, and young adults, this medicine may increase mental or emotional problems  This may lead to thoughts of suicide and violence  Talk with your doctor right away if you have any thoughts or behavior changes that concern you  Tell your doctor if you or anyone in your family has a history of bipolar disorder or suicide attempts    · This medicine may cause the following problems:  ? Increased risk of seizures  ? Changes in mood or behavior  ? High blood pressure  ? Serious skin reactions  · This medicine may make you dizzy or drowsy  Do not drive or do anything that could be dangerous until you know how this medicine affects you  · Zyban® is only part of a complete program to help you quit smoking  You may still want to smoke at times  Have a plan to cope with these situations  · Do not stop using this medicine suddenly  Your doctor will need to slowly decrease your dose before you stop it completely  · Tell any doctor or dentist who treats you that you are using this medicine  This medicine may affect certain medical test results  · Your doctor will check your progress and the effects of this medicine at regular visits  Keep all appointments  · Keep all medicine out of the reach of children  Never share your medicine with anyone  Possible Side Effects While Using This Medicine:   Call your doctor right away if you notice any of these side effects:  · Allergic reaction: Itching or hives, swelling in your face or hands, swelling or tingling in your mouth or throat, chest tightness, trouble breathing  · Blistering, peeling, red skin rash  · Chest pain, trouble breathing, fast, slow, or uneven heartbeat  · Eye pain, vision changes, seeing halos around lights  · Muscle or joint pain, fever with rash  · Seeing or hearing things that are not there, feeling like people are against you  · Seizures  · Sudden increase in energy, racing thoughts, trouble sleeping  · Thoughts of hurting yourself, worsening depression, severe agitation or confusion  If you notice these less serious side effects, talk with your doctor:   · Dry mouth  · Headache, dizziness  · Nausea, vomiting, constipation, diarrhea, gas, stomach pain  · Weight gain or loss  If you notice other side effects that you think are caused by this medicine, tell your doctor     Call your doctor for medical advice about side effects  You may report side effects to FDA at 3-430-FDA-4083    © Copyright [facility] 2022 Information is for End User's use only and may not be sold, redistributed or otherwise used for commercial purposes  The above information is an  only  It is not intended as medical advice for individual conditions or treatments  Talk to your doctor, nurse or pharmacist before following any medical regimen to see if it is safe and effective for you  Diverticulitis   WHAT YOU NEED TO KNOW:   What is diverticulitis? Diverticulitis is a condition that causes small pockets along your intestine called diverticula to become inflamed or infected  This is caused by hard bowel movement, food, or bacteria that get stuck in the pockets  What are the signs and symptoms of diverticulitis? · Pain in the lower left side of your abdomen    · Fever and chills    · Nausea or vomiting     · Constipation or diarrhea     · An urge to urinate or have a bowel movement more often than usual     · Bloody bowel movements    · Bloating and gas    How is diverticulitis diagnosed? Your healthcare provider will examine you  He or she will ask questions about your symptoms and health history  You may need any of the following:  · Blood and urine tests  may show signs of infection or inflammation  · CT scan or ultrasound  pictures may be used to find problems in your intestines  You may be given contrast liquid to help your intestines show up better in the pictures  Tell the healthcare provider if you have ever had an allergic reaction to contrast liquid  · A colonoscopy  is used to look at your intestines with a scope  A scope (long bendable tube with a light on the end) is used to take pictures  This test may show swollen diverticula or bleeding  Samples may be taken from your digestive tract and sent to a lab for tests   Bleeding may be controlled with tools that are inserted through the scope        How is diverticulitis treated? Mild diverticulitis can be treated at home  You may need to rest and follow a clear liquid diet until your diverticulitis gets better  You will be admitted to the hospital if you have severe diverticulitis  You may need any of the following:  · Antibiotics  help treat or prevent a bacterial infection  · Prescription pain medicine  may be given  Ask your healthcare provider how to take this medicine safely  Some prescription pain medicines contain acetaminophen  Do not take other medicines that contain acetaminophen without talking to your healthcare provider  Too much acetaminophen may cause liver damage  Prescription pain medicine may cause constipation  Ask your healthcare provider how to prevent or treat constipation  · An IV  may be used to give you liquids and nutrition  You may not be able to eat or drink anything until your healthcare provider says it is okay  · Drainage  may be done to reduce inflammation or treat infection  Your healthcare provider may insert a small tube through an incision in your abdomen to drain pus from infected diverticula  · Surgery  may be needed if there is a hole in your bowel or a large amount of swelling  A healthcare provider will remove the infected or inflamed areas of your colon  How can I manage my symptoms? A clear liquid diet will allow your intestines to heal  A clear liquid diet includes any liquids that you can see through  Examples include water, ginger-laura, cranberry or apple juice, frozen fruit ice, or broth  Ask your healthcare provider for more information on a clear liquid diet  When should I seek immediate care? · You have bowel movement or foul-smelling discharge leaking from your vagina or in your urine  · You have severe diarrhea  · You urinate less than usual or not at all  · You are not able to have a bowel movement  · You cannot stop vomiting       · You have severe abdominal pain, a fever, and your abdomen is larger than usual      · You have new or increased blood in your bowel movements  When should I call my doctor? · You have pain when you urinate  · Your symptoms get worse or do not go away  · You have questions or concerns about your condition or care  CARE AGREEMENT:   You have the right to help plan your care  Learn about your health condition and how it may be treated  Discuss treatment options with your healthcare providers to decide what care you want to receive  You always have the right to refuse treatment  The above information is an  only  It is not intended as medical advice for individual conditions or treatments  Talk to your doctor, nurse or pharmacist before following any medical regimen to see if it is safe and effective for you  © Copyright [employer] 2022 Information is for End User's use only and may not be sold, redistributed or otherwise used for commercial purposes   All illustrations and images included in CareNotes® are the copyrighted property of A D A M , Inc  or [address] [place] [address]

## 2022-05-04 NOTE — ASSESSMENT & PLAN NOTE
Symptoms stable  SOB only rarely with physical exertion or exposure to pollen  Requested refill albuterol  Refill sent  Strongly encouraged smoking cessation

## 2022-05-04 NOTE — ASSESSMENT & PLAN NOTE
Patient states he is ready to quit smoking  Currently smoking 0 5 ppd, most recently quit for 1 week but resumed smoking a few days ago  Has tried nicotine patches and gum in past, does not want another nicotine replacement product  - Wellbutrin  mg BID, pt to start after finishing outpt antibiotics for diverticulitis  - Pt agreed to choose a quit date 1 to 2 weeks after starting Wellbutrin  - Discussed making the necessary changes to routine and lifestyle to promote success with smoking cessation    - Follow up with PCP in 6 weeks

## 2022-05-19 ENCOUNTER — OFFICE VISIT (OUTPATIENT)
Dept: GASTROENTEROLOGY | Facility: CLINIC | Age: 37
End: 2022-05-19
Payer: COMMERCIAL

## 2022-05-19 VITALS
BODY MASS INDEX: 40.96 KG/M2 | SYSTOLIC BLOOD PRESSURE: 114 MMHG | HEIGHT: 71 IN | WEIGHT: 292.6 LBS | TEMPERATURE: 98.4 F | DIASTOLIC BLOOD PRESSURE: 80 MMHG

## 2022-05-19 DIAGNOSIS — K57.92 DIVERTICULITIS: Primary | ICD-10-CM

## 2022-05-19 DIAGNOSIS — K21.9 GASTROESOPHAGEAL REFLUX DISEASE, UNSPECIFIED WHETHER ESOPHAGITIS PRESENT: ICD-10-CM

## 2022-05-19 DIAGNOSIS — K57.32 SIGMOID DIVERTICULITIS: ICD-10-CM

## 2022-05-19 PROCEDURE — 99214 OFFICE O/P EST MOD 30 MIN: CPT | Performed by: INTERNAL MEDICINE

## 2022-05-19 NOTE — PATIENT INSTRUCTIONS
Scheduled date of colonoscopy (as of today):07 28 22  Physician performing colonoscopy:DR CHAVEZ  Location of colonoscopy:[facility]  Bowel prep reviewed with patient:MIRALAX/MAG CITRATE  Instructions reviewed with patient by:RAMÍREZ  Clearances:  N/A

## 2022-05-19 NOTE — PROGRESS NOTES
[facility] - Outpatient Consultation  Faustino Albright 43 y o  male MRN: [id number]  Encounter: [id number]          ASSESSMENT AND PLAN:      1  Diverticulitis  Multiple episodes of severe recurrent diverticulitis, last episode 04/20/2022 requiring hospitalization  He has never had a colonoscopy which should be for performed although low suspicion for underlying malignancy, she would also be performed to document extent of diverticulosis  After colonoscopy he should certainly be evaluated by Colorectal surgery for consideration of possible hemicolectomy given the recurrent bouts of diverticulitis over short period of time  Plan on MiraLax Mag medium citrate for bowel prep  Benefits and risks procedure discussed in detail   - Colonoscopy; Future  - Ambulatory Referral to Colorectal Surgery; Future    2  Gastroesophageal reflux disease, unspecified whether esophagitis present  Well controlled with daily pantoprazole recommended to continue this daily as opposed to as needed    ______________________________________________________________________    HPI: Mr Melia Dorado is a pleasant 68-year-old male presenting after recent admission for diverticulitis  He was admitted on 04/20/2022 due to lower abdominal pain and was found to have severe acute sigmoid diverticulitis  He has been having recurrent episodes of diverticulitis for the past 5 years, and believes he has had at least 7 episodes requiring treatment, and a few hospitalizations  While the pain is severe he usually can not handle it, but he is very frustrated at the frequency of the episodes and particularly does not tolerate the antibiotics very well  He has never had a colonoscopy before despite recurrent bouts of diverticulitis due to insurance issues, with his current insurance refusing to cover the cost of the colonoscopy      He also has GERD, which is pretty well controlled with pantoprazole if he takes it regularly  REVIEW OF SYSTEMS:    CONSTITUTIONAL: Denies any fever, chills, rigors, and weight loss  HEENT: Denies odynophagia, tinnitus  CARDIOVASCULAR: No chest pain or palpitations  RESPIRATORY: Denies any cough, hemoptysis, shortness of breath or dyspnea on exertion  GASTROINTESTINAL: As noted in the History of Present Illness  GENITOURINARY: No problems with urination  Denies any hematuria or dysuria  NEUROLOGIC: No dizziness or vertigo, denies headaches  MUSCULOSKELETAL: Denies any muscle or joint pain  SKIN: Denies skin rashes or itching  ENDOCRINE:  Denies intolerance to heat or cold  PSYCHOSOCIAL: Denies depression or anxiety  Denies any recent memory loss         Historical Information   Past Medical History:   Diagnosis Date    Abscess     Allergic     Asthma     Diverticulitis     Diverticulitis of colon     Gastroesophageal reflux disease 4/9/2020    MRSA infection     of an abscess    Obesity     SIRS (systemic inflammatory response syndrome) (HCC)      Past Surgical History:   Procedure Laterality Date    FRACTURE SURGERY      INCISION AND DRAINAGE OF WOUND Left 12/23/2016    Groin    MANDIBLE FRACTURE SURGERY       Social History   Social History     Substance and Sexual Activity   Alcohol Use Not Currently     Social History     Substance and Sexual Activity   Drug Use Not Currently    Frequency: 7 0 times per week    Types: Marijuana    Comment: stopped using, used 2 months ago (feb)     Social History     Tobacco Use   Smoking Status Current Every Day Smoker    Packs/day: 0 50    Years: 15 00    Pack years: 7 50    Types: Cigarettes   Smokeless Tobacco Never Used     Family History   Problem Relation Age of Onset    Diabetes Maternal Aunt     Asthma Mother     Depression Mother     No Known Problems Sister     No Known Problems Brother     No Known Problems Sister     No Known Problems Sister     No Known Problems Brother     No Known Problems Brother  No Known Problems Brother     No Known Problems Brother     No Known Problems Brother        Meds/Allergies       Current Outpatient Medications:     albuterol (Ventolin HFA) 90 mcg/act inhaler    buPROPion (WELLBUTRIN SR) 150 mg 12 hr tablet    pantoprazole (PROTONIX) 40 mg tablet    No Known Allergies        Objective     Blood pressure 114/80, temperature 98 4 °F (36 9 °C), temperature source Tympanic, height 5' 11" (1 803 m), weight 133 kg (292 lb 9 6 oz)  Body mass index is 40 81 kg/m²  PHYSICAL EXAM:      General Appearance:   Alert, cooperative, no distress   HEENT:   Normocephalic, atraumatic, anicteric  Neck:  Supple, symmetrical, trachea midline   Lungs:   Clear to auscultation bilaterally; no rales, rhonchi or wheezing; respirations unlabored    Heart[de-identified]   Regular rate and rhythm; no murmur, rub, or gallop  Abdomen:   Soft, non-tender, non-distended; normal bowel sounds; no masses, no organomegaly    Genitalia:   Deferred    Rectal:   Deferred    Extremities:  No cyanosis, clubbing or edema    Pulses:  2+ and symmetric    Skin:  No jaundice, rashes, or lesions          Lab Results:   No visits with results within 1 Day(s) from this visit     Latest known visit with results is:   Admission on 04/20/2022, Discharged on 04/22/2022   Component Date Value    WBC 04/20/2022 11 35 (A)    RBC 04/20/2022 4 83     Hemoglobin 04/20/2022 14 2     Hematocrit 04/20/2022 43 3     MCV 04/20/2022 90     MCH 04/20/2022 29 4     MCHC 04/20/2022 32 8     RDW 04/20/2022 12 6     MPV 04/20/2022 9 4     Platelets 13/38/0387 311     nRBC 04/20/2022 0     Neutrophils Relative 04/20/2022 68     Immat GRANS % 04/20/2022 0     Lymphocytes Relative 04/20/2022 23     Monocytes Relative 04/20/2022 6     Eosinophils Relative 04/20/2022 2     Basophils Relative 04/20/2022 1     Neutrophils Absolute 04/20/2022 7 76 (A)    Immature Grans Absolute 04/20/2022 0 05     Lymphocytes Absolute 04/20/2022 2 63     Monocytes Absolute 04/20/2022 0 62     Eosinophils Absolute 04/20/2022 0 22     Basophils Absolute 04/20/2022 0 07     Sodium 04/20/2022 137     Potassium 04/20/2022 3 5     Chloride 04/20/2022 100     CO2 04/20/2022 28     ANION GAP 04/20/2022 9     BUN 04/20/2022 11     Creatinine 04/20/2022 1 07     Glucose 04/20/2022 97     Calcium 04/20/2022 9 0     AST 04/20/2022 25     ALT 04/20/2022 27     Alkaline Phosphatase 04/20/2022 112     Total Protein 04/20/2022 8 7 (A)    Albumin 04/20/2022 3 8     Total Bilirubin 04/20/2022 0 38     eGFR 04/20/2022 87     Color, UA 04/20/2022 Yellow     Clarity, UA 04/20/2022 Clear     pH, UA 04/20/2022 5 5     Leukocytes, UA 04/20/2022 Trace (A)    Nitrite, UA 04/20/2022 Negative     Protein, UA 04/20/2022 Negative     Glucose, UA 04/20/2022 Negative     Ketones, UA 04/20/2022 Negative     Urobilinogen, UA 04/20/2022 0 2     Bilirubin, UA 04/20/2022 Negative     Blood, UA 04/20/2022 Negative     Specific Gravity, UA 04/20/2022 >=1 030     RBC, UA 04/20/2022 None Seen     WBC, UA 04/20/2022 0-1 (A)    Epithelial Cells 04/20/2022 Occasional     Bacteria, UA 04/20/2022 Occasional     Sodium 04/21/2022 140     Potassium 04/21/2022 3 7     Chloride 04/21/2022 105     CO2 04/21/2022 26     ANION GAP 04/21/2022 9     BUN 04/21/2022 12     Creatinine 04/21/2022 0 99     Glucose 04/21/2022 131     Calcium 04/21/2022 8 6     eGFR 04/21/2022 96     WBC 04/21/2022 9 97     RBC 04/21/2022 4 36     Hemoglobin 04/21/2022 12 8     Hematocrit 04/21/2022 39 5     MCV 04/21/2022 91     MCH 04/21/2022 29 4     MCHC 04/21/2022 32 4     RDW 04/21/2022 12 7     MPV 04/21/2022 9 5     Platelets 93/09/5253 267     nRBC 04/21/2022 0     Neutrophils Relative 04/21/2022 55     Immat GRANS % 04/21/2022 0     Lymphocytes Relative 04/21/2022 33     Monocytes Relative 04/21/2022 7     Eosinophils Relative 04/21/2022 4     Basophils Relative 04/21/2022 1     Neutrophils Absolute 04/21/2022 5 60     Immature Grans Absolute 04/21/2022 0 03     Lymphocytes Absolute 04/21/2022 3 24     Monocytes Absolute 04/21/2022 0 67     Eosinophils Absolute 04/21/2022 0 35     Basophils Absolute 04/21/2022 0 08     WBC 04/22/2022 8 26     RBC 04/22/2022 4 65     Hemoglobin 04/22/2022 13 2     Hematocrit 04/22/2022 41 2     MCV 04/22/2022 89     MCH 04/22/2022 28 4     MCHC 04/22/2022 32 0     RDW 04/22/2022 12 7     MPV 04/22/2022 9 4     Platelets 18/15/3374 290     nRBC 04/22/2022 0     Neutrophils Relative 04/22/2022 58     Immat GRANS % 04/22/2022 0     Lymphocytes Relative 04/22/2022 30     Monocytes Relative 04/22/2022 6     Eosinophils Relative 04/22/2022 5     Basophils Relative 04/22/2022 1     Neutrophils Absolute 04/22/2022 4 78     Immature Grans Absolute 04/22/2022 0 02     Lymphocytes Absolute 04/22/2022 2 50     Monocytes Absolute 04/22/2022 0 52     Eosinophils Absolute 04/22/2022 0 37     Basophils Absolute 04/22/2022 0 07     Sodium 04/22/2022 140     Potassium 04/22/2022 3 7     Chloride 04/22/2022 106     CO2 04/22/2022 26     ANION GAP 04/22/2022 8     BUN 04/22/2022 9     Creatinine 04/22/2022 0 97     Glucose 04/22/2022 118     Calcium 04/22/2022 8 7     eGFR 04/22/2022 98          Radiology Results:   CT abdomen pelvis with contrast    Result Date: 4/20/2022  Narrative: CT ABDOMEN AND PELVIS WITH IV CONTRAST INDICATION:   LLQ abdominal pain LLQ pain  COMPARISON:  12/12/2020 TECHNIQUE:  CT examination of the abdomen and pelvis was performed  Axial, sagittal, and coronal 2D reformatted images were created from the source data and submitted for interpretation  Radiation dose length product (DLP) for this visit:  1378 mGy-cm     This examination, like all CT scans performed in the [facility], was performed utilizing techniques to minimize radiation dose exposure, including the use of iterative reconstruction and automated exposure control  IV Contrast:  100 mL of iohexol (OMNIPAQUE) Enteric Contrast:  Enteric contrast was not administered  FINDINGS: ABDOMEN LOWER CHEST:  No clinically significant abnormality identified in the visualized lower chest  LIVER/BILIARY TREE:  Unremarkable  GALLBLADDER:  There are gallstone(s) within the gallbladder, without pericholecystic inflammatory changes  SPLEEN:  Unremarkable  PANCREAS:  Unremarkable  ADRENAL GLANDS:  Unremarkable  KIDNEYS/URETERS:  Unremarkable  No hydronephrosis  STOMACH AND BOWEL:  Sigmoid wall thickening with perisigmoidal stranding consistent with sigmoid diverticulitis appears increased in severity compared to the prior CT  APPENDIX:  No findings to suggest appendicitis  ABDOMINOPELVIC CAVITY:  Small pelvic infectious/inflammatory ascites  No pneumoperitoneum  No lymphadenopathy  VESSELS:  Unremarkable for patient's age  PELVIS REPRODUCTIVE ORGANS:  Unremarkable for patient's age  URINARY BLADDER:  Unremarkable  ABDOMINAL WALL/INGUINAL REGIONS:  Unremarkable  OSSEOUS STRUCTURES:  No acute fracture or destructive osseous lesion  Impression: Acute severe sigmoid diverticulitis  No free air  Workstation performed: [id number]   Answers for HPI/ROS submitted by the patient on 5/19/2022  Chronicity: recurrent  Onset: more than 1 year ago  Onset quality: gradual  Frequency: every several days  Episode duration: 2 weeks  Progression since onset: gradually improving  Pain location: LLQ  Pain - numeric: 5/10  Pain quality: sharp  Radiates to: LLQ  anorexia: No  arthralgias: No  belching: No  constipation: No  diarrhea: No  dysuria: No  fever: No  flatus: No  frequency: Yes  headaches: No  hematochezia: No  hematuria: No  melena: No  myalgias: Yes  nausea:  No  weight loss: No  vomiting: No  Aggravated by: nothing  Relieved by: bowel movements, recumbency, urination  Diagnostic workup: CT scan

## 2022-05-26 DIAGNOSIS — J45.20 MILD INTERMITTENT ASTHMA WITHOUT COMPLICATION: ICD-10-CM

## 2022-05-26 RX ORDER — ALBUTEROL SULFATE 90 UG/1
AEROSOL, METERED RESPIRATORY (INHALATION)
Qty: 54 G | Refills: 0 | Status: SHIPPED | OUTPATIENT
Start: 2022-05-26

## 2022-07-06 DIAGNOSIS — F17.200 TOBACCO DEPENDENCY: ICD-10-CM

## 2022-07-06 RX ORDER — BUPROPION HYDROCHLORIDE 150 MG/1
TABLET, EXTENDED RELEASE ORAL
Qty: 60 TABLET | Refills: 1 | Status: SHIPPED | OUTPATIENT
Start: 2022-07-06

## 2022-07-15 ENCOUNTER — ANESTHESIA EVENT (OUTPATIENT)
Dept: ANESTHESIOLOGY | Facility: HOSPITAL | Age: 37
End: 2022-07-15

## 2022-07-15 ENCOUNTER — ANESTHESIA (OUTPATIENT)
Dept: ANESTHESIOLOGY | Facility: HOSPITAL | Age: 37
End: 2022-07-15

## 2022-07-27 ENCOUNTER — ANESTHESIA EVENT (OUTPATIENT)
Dept: ANESTHESIOLOGY | Facility: HOSPITAL | Age: 37
End: 2022-07-27

## 2022-07-27 ENCOUNTER — ANESTHESIA (OUTPATIENT)
Dept: ANESTHESIOLOGY | Facility: HOSPITAL | Age: 37
End: 2022-07-27

## 2023-05-05 ENCOUNTER — HOSPITAL ENCOUNTER (EMERGENCY)
Facility: HOSPITAL | Age: 38
Discharge: HOME | End: 2023-05-05
Attending: EMERGENCY MEDICINE

## 2023-05-05 VITALS
SYSTOLIC BLOOD PRESSURE: 155 MMHG | DIASTOLIC BLOOD PRESSURE: 82 MMHG | RESPIRATION RATE: 18 BRPM | HEART RATE: 86 BPM | TEMPERATURE: 97.9 F | OXYGEN SATURATION: 97 %

## 2023-05-05 DIAGNOSIS — M54.2 NECK PAIN: Primary | ICD-10-CM

## 2023-05-05 DIAGNOSIS — M62.838 TRAPEZIUS MUSCLE SPASM: ICD-10-CM

## 2023-05-05 DIAGNOSIS — M54.9 UPPER BACK PAIN ON LEFT SIDE: ICD-10-CM

## 2023-05-05 RX ORDER — LIDOCAINE 50 MG/G
1 PATCH TOPICAL ONCE
Status: DISCONTINUED | OUTPATIENT
Start: 2023-05-05 | End: 2023-05-05 | Stop reason: HOSPADM

## 2023-05-05 RX ORDER — LIDOCAINE 50 MG/G
1 PATCH TOPICAL DAILY
Qty: 10 PATCH | Refills: 0 | Status: SHIPPED | OUTPATIENT
Start: 2023-05-05

## 2023-05-05 RX ORDER — CYCLOBENZAPRINE HCL 10 MG
10 TABLET ORAL 2 TIMES DAILY PRN
Qty: 20 TABLET | Refills: 0 | Status: SHIPPED | OUTPATIENT
Start: 2023-05-05

## 2023-05-05 RX ORDER — IBUPROFEN 600 MG/1
600 TABLET ORAL ONCE
Status: COMPLETED | OUTPATIENT
Start: 2023-05-05 | End: 2023-05-05

## 2023-05-05 RX ORDER — CYCLOBENZAPRINE HCL 10 MG
10 TABLET ORAL ONCE
Status: COMPLETED | OUTPATIENT
Start: 2023-05-05 | End: 2023-05-05

## 2023-05-05 RX ADMIN — IBUPROFEN 600 MG: 600 TABLET, FILM COATED ORAL at 17:18

## 2023-05-05 RX ADMIN — LIDOCAINE 1 PATCH: 50 PATCH TOPICAL at 17:19

## 2023-05-05 RX ADMIN — CYCLOBENZAPRINE HYDROCHLORIDE 10 MG: 10 TABLET, FILM COATED ORAL at 17:19

## 2023-05-05 NOTE — ED PROVIDER NOTES
"History  Chief Complaint   Patient presents with    Neck Pain     Pt c/o left sided neck pain that radiates down the spine  Denies trauma     HPI   Patient is a 42year old male with PMHx DM, diverticulitis, asthma, GERD who presents to the ED for evaluation of atraumatic left sided neck and upper shoulder pain for the past three days  Patient denies any falls or trauma but states he attempting to pull off a broken brake pad and needed to use a lot of force that may have strained his left shoulder/neck  Patient reports having a history of \"stiff neck\" in the past that resolved spontaneously  No history of herniated disks, neck surgery, shoulder surgery  Patient denies any fevers chills, restriction movement of the left shoulder, chest pain, back pain, shortness of breath, chest pain, numbness/tingling/weakness of the extremities, focal deficits, or any other complaints or concerns at this time  Prior to Admission Medications   Prescriptions Last Dose Informant Patient Reported? Taking?    albuterol (PROVENTIL HFA,VENTOLIN HFA) 90 mcg/act inhaler   No No   Sig: INHALE 2 PUFFS EVERY 6 HOURS AS NEEDED FOR WHEEZING OR SHORTNESS OF BREATH   buPROPion (WELLBUTRIN SR) 150 mg 12 hr tablet   No No   Sig: TAKE 1 TABLET(150 MG) BY MOUTH TWICE DAILY   pantoprazole (PROTONIX) 40 mg tablet   No No   Sig: Take 1 tablet (40 mg total) by mouth daily      Facility-Administered Medications: None       Past Medical History:   Diagnosis Date    Abscess     Allergic     Asthma     Diabetes mellitus ([facility] [address] )     Diverticulitis     Diverticulitis of colon     Gastroesophageal reflux disease 04/09/2020    MRSA infection     of an abscess    Obesity     SIRS (systemic inflammatory response syndrome) (HCC)        Past Surgical History:   Procedure Laterality Date    FRACTURE SURGERY      INCISION AND DRAINAGE OF WOUND Left 12/23/2016    Groin    MANDIBLE FRACTURE SURGERY         Family History   Problem Relation Age of Onset "  Diabetes Maternal Aunt     Asthma Mother     Depression Mother     No Known Problems Sister     No Known Problems Brother     No Known Problems Sister     No Known Problems Sister     No Known Problems Brother     No Known Problems Brother     No Known Problems Brother     No Known Problems Brother     No Known Problems Brother      I have reviewed and agree with the history as documented  E-Cigarette/Vaping    E-Cigarette Use Never User      E-Cigarette/Vaping Substances    Nicotine No     THC No     CBD No     Flavoring No     Other No     Unknown No      Social History     Tobacco Use    Smoking status: Every Day     Packs/day: 0 50     Years: 15 00     Pack years: 7 50     Types: Cigarettes    Smokeless tobacco: Never   Vaping Use    Vaping Use: Never used   Substance Use Topics    Alcohol use: Not Currently    Drug use: Not Currently     Frequency: 7 0 times per week     Types: Marijuana     Comment: stopped using, used 2 months ago (feb)        Review of Systems   Constitutional: Negative for chills and fever  HENT: Negative for congestion and sore throat  Eyes: Negative for pain and visual disturbance  Respiratory: Negative for cough and shortness of breath  Cardiovascular: Negative for chest pain and palpitations  Gastrointestinal: Negative for abdominal pain, diarrhea, nausea and vomiting  Genitourinary: Negative for dysuria and hematuria  Musculoskeletal: Positive for myalgias and neck pain  Skin: Negative for color change and rash  Neurological: Negative for dizziness and headaches  All other systems reviewed and are negative        Physical Exam  ED Triage Vitals [05/05/23 1623]   Temperature Pulse Respirations Blood Pressure SpO2   97 9 °F (36 6 °C) 86 18 155/82 97 %      Temp Source Heart Rate Source Patient Position - Orthostatic VS BP Location FiO2 (%)   Temporal Monitor Standing Left arm --      Pain Score       7             Orthostatic Vital Signs  Vitals:    05/05/23 1623   BP: 155/82   Pulse: 86   Patient Position - Orthostatic VS: Standing       Physical Exam  Vitals and nursing note reviewed  Constitutional:       General: He is not in acute distress  HENT:      Head: Normocephalic and atraumatic  Nose: No congestion or rhinorrhea  Mouth/Throat:      Mouth: Mucous membranes are moist    Eyes:      General: No scleral icterus  Extraocular Movements: Extraocular movements intact  Conjunctiva/sclera: Conjunctivae normal    Cardiovascular:      Rate and Rhythm: Normal rate and regular rhythm  Pulses: Normal pulses  Heart sounds: Normal heart sounds  No murmur heard  Pulmonary:      Effort: Pulmonary effort is normal  No respiratory distress  Breath sounds: Normal breath sounds  No wheezing, rhonchi or rales  Abdominal:      General: Abdomen is flat  There is no distension  Musculoskeletal:         General: No deformity or signs of injury  Normal range of motion  Left shoulder: Tenderness (Superior left trapezius muscle) present  Normal range of motion  Cervical back: Normal range of motion and neck supple  Tenderness (Left paraspinal musculature) present  No rigidity  Normal range of motion  Skin:     General: Skin is warm  Capillary Refill: Capillary refill takes less than 2 seconds  Coloration: Skin is not pale  Findings: No bruising  Neurological:      Mental Status: He is alert  Mental status is at baseline     Psychiatric:         Mood and Affect: Mood normal          Behavior: Behavior normal          ED Medications  Medications   ibuprofen (MOTRIN) tablet 600 mg (600 mg Oral Given 5/5/23 1718)   cyclobenzaprine (FLEXERIL) tablet 10 mg (10 mg Oral Given 5/5/23 1719)       Diagnostic Studies  Results Reviewed     None                 No orders to display         Procedures  Procedures      ED Course                             SBIRT 20yo+    Flowsheet Row Most Recent Value Initial Alcohol Screen: US AUDIT-C     1  How often do you have a drink containing alcohol? 0 Filed at: 05/05/2023 1626   2  How many drinks containing alcohol do you have on a typical day you are drinking? 0 Filed at: 05/05/2023 1626   3a  Male UNDER 65: How often do you have five or more drinks on one occasion? 0 Filed at: 05/05/2023 1626   3b  FEMALE Any Age, or MALE 65+: How often do you have 4 or more drinks on one occassion? 0 Filed at: 05/05/2023 1626   Audit-C Score 0 Filed at: 05/05/2023 1626   ARIANNA: How many times in the past year have you    Used an illegal drug or used a prescription medication for non-medical reasons? Never Filed at: 05/05/2023 1626                Medical Decision Making  Neck pain: acute illness or injury  Trapezius muscle spasm: acute illness or injury  Upper back pain on left side: acute illness or injury  Amount and/or Complexity of Data Reviewed  Independent Historian: friend      Risk  OTC drugs  Prescription drug management  Patient is a 38-year-old male who presents the ED for evaluation of left shoulder and neck pain  History and physical exam was consistent with muscle spasm of left trapezius  Doubt fracture or dislocation due to lack of trauma, doubt cellulitis due to lack of infectious symptoms  Patient treated with Motrin, Flexeril, Lidoderm patch  Patient offered treatment with local anesthetic/trigger point injection, patient declines at this time  Discussed findings and plan with patient  Advised on need for outpatient follow up, given information and referral for comprehensive spine  Given return precautions verbally and in discharge instructions  Given prescription for flexeril and lidocaine patch and advised on proper use  All questions answered  Patient expressed verbal understanding and is agreeable with plan for discharge with outpatient follow up      Disposition  Final diagnoses:   Neck pain   Trapezius muscle spasm   Upper back pain on left side     Time reflects when diagnosis was documented in both MDM as applicable and the Disposition within this note     Time User Action Codes Description Comment    5/5/2023  5:17 PM Eugenie Iredell Add [M54 2] Neck pain     5/5/2023  5:17 PM Eugenie Iredell Add [A62 370] Trapezius muscle spasm     5/5/2023  5:17 PM Thierry Castaneda Cousins Add [M54 9] Upper back pain on left side       ED Disposition     ED Disposition   Discharge    Condition   Stable    Date/Time   Fri May 5, 2023  5:17 PM    [address] discharge to home/self care                 Follow-up Information     Follow up With Specialties Details Why Contact Info Additional Del 18, CRNP Family Medicine, Nurse Practitioner In 1 week  [address]  [address]  [phone]       [facility] Physical Therapy Schedule an appointment as soon as possible for a visit   [phone]    [address] [place] [address] Emergency Department Emergency Medicine Go to  If symptoms worsen [address] Emergency Department, [address] [place] [address], [address]          Discharge Medication List as of 5/5/2023  5:21 PM      START taking these medications    Details   cyclobenzaprine (FLEXERIL) 10 mg tablet Take 1 tablet (10 mg total) by mouth 2 (two) times a day as needed for muscle spasms, Starting Fri 5/5/2023, Normal      lidocaine (Lidoderm) 5 % Apply 1 patch topically over 12 hours daily Remove & Discard patch within 12 hours or as directed by MD, Starting Fri 5/5/2023, Normal         CONTINUE these medications which have NOT CHANGED    Details   albuterol (PROVENTIL HFA,VENTOLIN HFA) 90 mcg/act inhaler INHALE 2 PUFFS EVERY 6 HOURS AS NEEDED FOR WHEEZING OR SHORTNESS OF BREATH, Normal      buPROPion (WELLBUTRIN SR) 150 mg 12 hr tablet TAKE 1 TABLET(150 MG) BY MOUTH TWICE DAILY, Normal      pantoprazole (PROTONIX) 40 mg tablet Take 1 tablet (40 mg total) by mouth daily, Starting Wed 5/4/2022, Normal               PDMP Review     None           ED Provider  Attending physically available and evaluated Miki Brothers I managed the patient along with the ED Attending      Electronically Signed by         John Person DO  05/06/23 7151

## 2023-05-05 NOTE — DISCHARGE INSTRUCTIONS
You have been prescribed a muscle relaxer called Flexeril  Do not take it with other sedative medications (sleep aids; benzodiazepines such as Valium, Ativan, Xanax; other opioids, etc ) Do not drive, operate heavy equipment, work, hold infants/young kids, or do any other exertional or taxing activities while using these medications  Continue to monitor symptoms at home  Please follow up with your primary care doctor and comprehensive spine center  Please return to the Emergency Department if you experience worsening of your current symptoms, fevers or chills, severe pain, inability to move the neck, or any other concerning symptoms

## 2023-05-05 NOTE — Clinical Note
Britany Nato was seen and treated in our emergency department on 5/5/2023  Diagnosis: Left Trapezius Muscle Spasm    Durel Bernheim  is off the rest of the shift today  He may return on this date: 05/08/2023         If you have any questions or concerns, please don't hesitate to call        Yogesh Gonzales, DO    ______________________________           _______________          _______________  Hospital Representative                              Date                                Time

## 2023-05-05 NOTE — ED ATTENDING ATTESTATION
5/5/2023  ISunita DO, saw and evaluated the patient  I have discussed the patient with the resident/non-physician practitioner and agree with the resident's/non-physician practitioner's findings, Plan of Care, and MDM as documented in the resident's/non-physician practitioner's note, except where noted  All available labs and Radiology studies were reviewed  I was present for key portions of any procedure(s) performed by the resident/non-physician practitioner and I was immediately available to provide assistance  At this point I agree with the current assessment done in the Emergency Department  I have conducted an independent evaluation of this patient a history and physical is as follows:    Patient is a 52-year-old right-hand-dominant male, history of diverticulitis, tobacco use, GERD and diabetes, accompanied by his girlfriend  Patient says about 2 or 3 days ago he noticed some discomfort in the base of his left neck, left upper shoulder area, worse with moving around in bed with remaining still  No numbness or tingling, no trauma but he does work as a  and about a day before hand was struggling to get a break and drop off and pulled very hard  He did not suffer any direct trauma or chest injury but thinks that may have been related  He took 800 mg of ibuprofen once last evening with some mild relief, then took Tylenol about 1 PM today with again some slight relief  He has no headache, no fever or chills  His girlfriend has not noticed any confusion or disorientation  He has a primary care physician but has not sought medical attention for this prior to coming to the ED today  General:  Patient is well-appearing  Head:  Atraumatic  Eyes:  Conjunctiva pink  ENT: Mucous members are moist, no facial erythema or swelling  Neck:  Supple, no bony tenderness, slight trigger point at the base of his left trapezius musculature  No redness or swelling    Slight discomfort with passive sidebending to the right and rotation to the right  Pulmonary:  Lungs clear to auscultation bilaterally  Neurologic:  Awake, fluent speech, normal comprehension, strength is 5 out of 5 the bilateral shoulders, elbows, wrist,  strength equal and symmetrical bilaterally  Negative Spurling bilaterally  Normal sensation throughout both arms  Skin:  Pink warm and dry  Psychiatric:  Alert, pleasant, cooperative        ED Course     Patient overall well-appearing, muscular strain versus trigger point  He has no evidence of cervical radiculopathy and I do not believe he requires an MRI  There is no bony tenderness, no direct trauma, do not believe x-rays or CT is indicated either  Patient offered trigger point injection however he declined  Supportive care, importance of follow-up and return precautions were discussed with patient and girlfriend, who expressed understanding  DIAGNOSIS:  Acute left neck pain, acute trigger point    [address]      Patient presents with acute new problem with:  Uncertain prognosis  Acute uncomplicated injury    Chronic conditions affecting care: As per HPI    COLLECTION AND INTERPRETATION OF DATA  Additional history obtained from: girlFriend      Tests considered but not ordered: As above    RISK  Drugs (OTC, Rx, Controlled substances): Prescription management  All of the patient's current prescription medications should be continued        Social Determinants of Health:  Presentation to ED outside of business hours or on night shift          Critical Care Time  Procedures

## 2023-05-08 ENCOUNTER — NURSE TRIAGE (OUTPATIENT)
Dept: PHYSICAL THERAPY | Facility: OTHER | Age: 38
End: 2023-05-08

## 2023-05-08 DIAGNOSIS — M54.2 ACUTE NECK PAIN: Primary | ICD-10-CM

## 2023-05-08 NOTE — TELEPHONE ENCOUNTER
Additional Information  • Negative: Is this related to a work injury? • Negative: Is this related to an MVA? • Negative: Are you currently recieving homecare services? • Negative: Has the patient had unexplained weight loss? • Negative: Does the patient have a fever? • Negative: Is the patient experiencing urine retention? • Negative: Is the patient experiencing acute drop foot or paralysis? • Negative: Has the patient experienced major trauma? (fall from height, high speed collision, direct blow to spine) and is also experiencing nausea, light-headedness, or loss of consciousness? • Negative: Is the patient experiencing blood in sputum? • Negative: Is this a chronic condition? Background - Initial Assessment  Clinical complaint: left sided neck pain that radiates to the left shoulder and upper back  No numbness or tingling  NKI  No history of back or neck issues  Date of onset: 4 days  Frequency of pain: constant  Quality of pain: aching    Protocols used: [facility] COMPREHENSIVE SPINE PROGRAM PROTOCOL    This RN did review in detail the Comprehensive Spine Program and what we can provide for their neck pain  Patient is agreeable to being triaged by this RN and would like to proceed with Physical Therapy  Referral was placed for Physical Therapy at the [facility] site  Patients information was sent to the  to make evaluation appointment  Patient made aware that the PT office  will be calling to schedule the appointment  Patient was provided with the phone number to the PT office  No further questions and/or concerns were voiced by the patient at this time  Patient states understanding of the referral that was placed  Referral Closed

## 2023-05-10 ENCOUNTER — HOSPITAL ENCOUNTER (EMERGENCY)
Facility: HOSPITAL | Age: 38
Discharge: HOME | End: 2023-05-10
Attending: EMERGENCY MEDICINE

## 2023-05-10 VITALS
RESPIRATION RATE: 18 BRPM | TEMPERATURE: 98.1 F | HEART RATE: 93 BPM | DIASTOLIC BLOOD PRESSURE: 79 MMHG | OXYGEN SATURATION: 95 % | SYSTOLIC BLOOD PRESSURE: 146 MMHG

## 2023-05-10 DIAGNOSIS — M62.838 TRAPEZIUS MUSCLE SPASM: Primary | ICD-10-CM

## 2023-05-10 RX ORDER — ACETAMINOPHEN 325 MG/1
975 TABLET ORAL ONCE
Status: COMPLETED | OUTPATIENT
Start: 2023-05-10 | End: 2023-05-10

## 2023-05-10 RX ORDER — METHOCARBAMOL 500 MG/1
500 TABLET, FILM COATED ORAL 2 TIMES DAILY
Qty: 20 TABLET | Refills: 0 | Status: SHIPPED | OUTPATIENT
Start: 2023-05-10

## 2023-05-10 RX ORDER — NAPROXEN 500 MG/1
500 TABLET ORAL 2 TIMES DAILY WITH MEALS
Qty: 30 TABLET | Refills: 0 | Status: SHIPPED | OUTPATIENT
Start: 2023-05-10

## 2023-05-10 RX ORDER — KETOROLAC TROMETHAMINE 30 MG/ML
15 INJECTION, SOLUTION INTRAMUSCULAR; INTRAVENOUS ONCE
Status: COMPLETED | OUTPATIENT
Start: 2023-05-10 | End: 2023-05-10

## 2023-05-10 RX ORDER — LIDOCAINE 50 MG/G
1 PATCH TOPICAL ONCE
Status: DISCONTINUED | OUTPATIENT
Start: 2023-05-10 | End: 2023-05-10 | Stop reason: HOSPADM

## 2023-05-10 RX ADMIN — ACETAMINOPHEN 975 MG: 325 TABLET ORAL at 19:17

## 2023-05-10 RX ADMIN — LIDOCAINE 1 PATCH: 50 PATCH TOPICAL at 19:17

## 2023-05-10 RX ADMIN — KETOROLAC TROMETHAMINE 15 MG: 30 INJECTION, SOLUTION INTRAMUSCULAR; INTRAVENOUS at 19:17

## 2023-05-10 NOTE — ED ATTENDING ATTESTATION
5/10/2023  I, Rylie Emery MD, saw and evaluated the patient  I have discussed the patient with the resident/non-physician practitioner and agree with the resident's/non-physician practitioner's findings, Plan of Care, and MDM as documented in the resident's/non-physician practitioner's note, except where noted  All available labs and Radiology studies were reviewed  I was present for key portions of any procedure(s) performed by the resident/non-physician practitioner and I was immediately available to provide assistance  At this point I agree with the current assessment done in the Emergency Department    I have conducted an independent evaluation of this patient a history and physical is as follows:    ED Course          Critical Care Time  Procedures

## 2023-05-10 NOTE — DISCHARGE INSTRUCTIONS
Follow up with Comprehensive spine program as scheduled  Instead of taking the Flexeril, I am switching you to Robaxin  Please do not take 2 muscle relaxants at one time  Please take the Naprosyn in the morning and at night  Every 8 hours you can take 650 mg of Tylenol  Continue to do lidocaine patches, heat therapy, gentle stretching  Avoid heavy lifting  Return to the ED if you experience numbness and tingling of the left arm, severe pain, inability to move the neck, fevers and chills

## 2023-05-11 NOTE — ED PROVIDER NOTES
History  Chief Complaint   Patient presents with   • Neck Pain     Pt having left sided neck pain  Was seen three days ago for same and was told to come back if didn't resolve  Denies injury     Patient is a 42year old male with PMHx DM, diverticulitis, asthma, GERD who presents to the ED for evaluation of atraumatic left sided neck and upper shoulder pain for the past several days  Patient denies any falls or trauma but states he attempting to pull off a broken brake pad and needed to use a lot of force that may have strained his left shoulder/neck  This occurred about 1 week ago  At that time, patient did feel a stiffness in his neck  Patient presented to the ED initially on 5/5 for muscle pain, was treated with antiinflammatory and discharged with Rx Flexeril  Patient states he has been taking this, but feels that his pain is not improved  Patient was initially taking Motrin, which helped the pain transiently, but he has since run out of medication  He continues to feel pain in the L side of the neck with movement of the head and arm, prompting repeat evaluation  No history of herniated disks, neck surgery, shoulder surgery  Patient denies any fevers chills, restriction movement of the left shoulder, chest pain, back pain, shortness of breath, chest pain, numbness/tingling/weakness of the extremities, focal deficits, or any other complaints or concerns at this time  Prior to Admission Medications   Prescriptions Last Dose Informant Patient Reported? Taking?    albuterol (PROVENTIL HFA,VENTOLIN HFA) 90 mcg/act inhaler   No No   Sig: INHALE 2 PUFFS EVERY 6 HOURS AS NEEDED FOR WHEEZING OR SHORTNESS OF BREATH   buPROPion (WELLBUTRIN SR) 150 mg 12 hr tablet   No No   Sig: TAKE 1 TABLET(150 MG) BY MOUTH TWICE DAILY   cyclobenzaprine (FLEXERIL) 10 mg tablet   No No   Sig: Take 1 tablet (10 mg total) by mouth 2 (two) times a day as needed for muscle spasms   lidocaine (Lidoderm) 5 %   No No   Sig: Apply 1 patch topically over 12 hours daily Remove & Discard patch within 12 hours or as directed by MD   pantoprazole (PROTONIX) 40 mg tablet   No No   Sig: Take 1 tablet (40 mg total) by mouth daily      Facility-Administered Medications: None       Past Medical History:   Diagnosis Date   • Abscess    • Allergic    • Asthma    • Diabetes mellitus ([facility] [address] )    • Diverticulitis    • Diverticulitis of colon    • Gastroesophageal reflux disease 04/09/2020   • MRSA infection     of an abscess   • Obesity    • SIRS (systemic inflammatory response syndrome) ([facility])        Past Surgical History:   Procedure Laterality Date   • FRACTURE SURGERY     • INCISION AND DRAINAGE OF WOUND Left 12/23/2016    Groin   • MANDIBLE FRACTURE SURGERY         Family History   Problem Relation Age of Onset   • Diabetes Maternal Aunt    • Asthma Mother    • Depression Mother    • No Known Problems Sister    • No Known Problems Brother    • No Known Problems Sister    • No Known Problems Sister    • No Known Problems Brother    • No Known Problems Brother    • No Known Problems Brother    • No Known Problems Brother    • No Known Problems Brother      I have reviewed and agree with the history as documented  E-Cigarette/Vaping   • E-Cigarette Use Never User      E-Cigarette/Vaping Substances   • Nicotine No    • THC No    • CBD No    • Flavoring No    • Other No    • Unknown No      Social History     Tobacco Use   • Smoking status: Every Day     Packs/day: 0 50     Years: 15 00     Pack years: 7 50     Types: Cigarettes   • Smokeless tobacco: Never   Vaping Use   • Vaping Use: Never used   Substance Use Topics   • Alcohol use: Not Currently   • Drug use: Not Currently     Frequency: 7 0 times per week     Types: Marijuana     Comment: stopped using, used 2 months ago (feb)        Review of Systems   Constitutional: Negative for chills and fever  HENT: Negative for ear pain and sore throat  Eyes: Negative for pain and visual disturbance  Respiratory: Negative for cough and shortness of breath  Cardiovascular: Negative for chest pain and palpitations  Gastrointestinal: Negative for abdominal pain and vomiting  Genitourinary: Negative for dysuria and hematuria  Musculoskeletal: Positive for neck pain and neck stiffness  Negative for arthralgias, back pain and myalgias  Skin: Negative for color change and rash  Neurological: Negative for dizziness, seizures, syncope, weakness, light-headedness, numbness and headaches  All other systems reviewed and are negative  Physical Exam  ED Triage Vitals [05/10/23 1758]   Temperature Pulse Respirations Blood Pressure SpO2   98 1 °F (36 7 °C) 93 18 146/79 95 %      Temp Source Heart Rate Source Patient Position - Orthostatic VS BP Location FiO2 (%)   Temporal Monitor Sitting Left arm --      Pain Score       10 - Worst Possible Pain             Orthostatic Vital Signs  Vitals:    05/10/23 1758   BP: 146/79   Pulse: 93   Patient Position - Orthostatic VS: Sitting       Physical Exam  Vitals and nursing note reviewed  Constitutional:       General: He is not in acute distress  Appearance: Normal appearance  He is well-developed and normal weight  He is not ill-appearing or toxic-appearing  HENT:      Head: Normocephalic and atraumatic  Right Ear: External ear normal       Left Ear: External ear normal       Nose: Nose normal  No congestion  Mouth/Throat:      Mouth: Mucous membranes are moist       Pharynx: Oropharynx is clear  Eyes:      Extraocular Movements: Extraocular movements intact  Conjunctiva/sclera: Conjunctivae normal    Cardiovascular:      Rate and Rhythm: Normal rate and regular rhythm  Pulses: Normal pulses  Heart sounds: Normal heart sounds  No murmur heard  Pulmonary:      Effort: Pulmonary effort is normal  No respiratory distress  Breath sounds: Normal breath sounds  Abdominal:      General: Abdomen is flat        Palpations: Abdomen is soft  Tenderness: There is no abdominal tenderness  Musculoskeletal:         General: No swelling  Normal range of motion  Cervical back: Normal range of motion and neck supple  No tenderness  Comments: Patient has tenderness over the superior L trapezius muscle; spasm noted there  No bony deformities of the cervical spine  No midline cervical, thoracic, or lumbar spine  Pain is reproducible with movement of head to the left  Skin:     General: Skin is warm and dry  Capillary Refill: Capillary refill takes less than 2 seconds  Neurological:      General: No focal deficit present  Mental Status: He is alert and oriented to person, place, and time  Cranial Nerves: No cranial nerve deficit  Sensory: No sensory deficit  Motor: No weakness  Psychiatric:         Mood and Affect: Mood normal          ED Medications  Medications   ketorolac (TORADOL) injection 15 mg (15 mg Intramuscular Given 5/10/23 1917)   acetaminophen (TYLENOL) tablet 975 mg (975 mg Oral Given 5/10/23 1917)       Diagnostic Studies  Results Reviewed     None                 No orders to display         Procedures  Procedures      ED Course       Patient presents to the ED with muscle spasm of the L trapezius  Has been seen in the ED on 5/5 for similar pain  Exam shows spasm of L trapezius  No radiculopathy  No trauma  Patient got improvement with NSAIDS in the ED on initial presentation, but states he ran out of Motrin at home and has only been taking the Flexeril as prescribed  Reports continued pain  Will switch Flexeril to Robaxin and prescribe Naprosyn  Patient has not been taking Tylenol  Instructed the patient that he can take the Naprosyn in the morning in the evening, and take Tylenol during the day  Advised that he should do gentle stretching of the area, heat therapy, and lidocaine patches that can be purchased over-the-counter    Patient already has a follow-up appointment scheduled with the comprehensive spine program from a referral that was given to him on 5/5  Patient is agreeable with plan for discharge  Return precautions were given and he is ambulatory at discharge  MDM      Disposition  Final diagnoses:   Trapezius muscle spasm     Time reflects when diagnosis was documented in both MDM as applicable and the Disposition within this note     Time User Action Codes Description Comment    5/10/2023  6:59 PM Twin Hui Add [[id number] [id number]] Trapezius muscle spasm       ED Disposition     ED Disposition   Discharge    Condition   Stable    Date/Time   Wed May 10, 2023  7:03 PM    [address] [place] [address] discharge to home/self care  Follow-up Information    None         Discharge Medication List as of 5/10/2023  7:03 PM      START taking these medications    Details   methocarbamol (ROBAXIN) 500 mg tablet Take 1 tablet (500 mg total) by mouth 2 (two) times a day, Starting Wed 5/10/2023, Normal      naproxen (Naprosyn) 500 mg tablet Take 1 tablet (500 mg total) by mouth 2 (two) times a day with meals, Starting Wed 5/10/2023, Normal         CONTINUE these medications which have NOT CHANGED    Details   cyclobenzaprine (FLEXERIL) 10 mg tablet Take 1 tablet (10 mg total) by mouth 2 (two) times a day as needed for muscle spasms, Starting Fri 5/5/2023, Normal      lidocaine (Lidoderm) 5 % Apply 1 patch topically over 12 hours daily Remove & Discard patch within 12 hours or as directed by MD, Starting Fri 5/5/2023, Normal      albuterol (PROVENTIL HFA,VENTOLIN HFA) 90 mcg/act inhaler INHALE 2 PUFFS EVERY 6 HOURS AS NEEDED FOR WHEEZING OR SHORTNESS OF BREATH, Normal      buPROPion (WELLBUTRIN SR) 150 mg 12 hr tablet TAKE 1 TABLET(150 MG) BY MOUTH TWICE DAILY, Normal      pantoprazole (PROTONIX) 40 mg tablet Take 1 tablet (40 mg total) by mouth daily, Starting Wed 5/4/2022, Normal           No discharge procedures on file      PDMP Review     None ED Provider  Attending physically available and evaluated Darrianylene Tawnya  I managed the patient along with the ED Attending      Electronically Signed by         Lukas Brower DO  05/10/23 7297

## 2025-03-26 NOTE — ED ATTENDING ATTESTATION
1/26/2021  IAsmita MD, saw and evaluated the patient  I have discussed the patient with the resident/non-physician practitioner and agree with the resident's/non-physician practitioner's findings, Plan of Care, and MDM as documented in the resident's/non-physician practitioner's note, except where noted  All available labs and Radiology studies were reviewed  I was present for key portions of any procedure(s) performed by the resident/non-physician practitioner and I was immediately available to provide assistance  At this point I agree with the current assessment done in the Emergency Department  I have conducted an independent evaluation of this patient a history and physical is as follows:    ED Course     Patient presents for evaluation due to 1 day of sore throat and sinus congestion  Patient also reports subjective fevers and chills  He denies any chest pain, shortness of breath, or abdominal pain  No additional complaints  Exam: AAOx3, mild distress, RRR, CTA, S/NT/ND  A/P:  Viral syndrome  Will check COVID swab  Will attempt to orally hydrate and treat with antipyretics    Offered IV fluids the patient wishes to hold at this time    Critical Care Time  Procedures
Cholesterol is still slightly elevated.  Rest of the labs are normal.  Please continue working on healthy diet especially low-fat.
yes...